# Patient Record
Sex: FEMALE | Race: WHITE | NOT HISPANIC OR LATINO | Employment: UNEMPLOYED | URBAN - METROPOLITAN AREA
[De-identification: names, ages, dates, MRNs, and addresses within clinical notes are randomized per-mention and may not be internally consistent; named-entity substitution may affect disease eponyms.]

---

## 2018-07-25 ENCOUNTER — OFFICE VISIT (OUTPATIENT)
Dept: FAMILY MEDICINE | Facility: CLINIC | Age: 28
End: 2018-07-25
Payer: OTHER GOVERNMENT

## 2018-07-25 VITALS
SYSTOLIC BLOOD PRESSURE: 108 MMHG | WEIGHT: 157 LBS | HEART RATE: 72 BPM | HEIGHT: 65 IN | BODY MASS INDEX: 26.16 KG/M2 | RESPIRATION RATE: 16 BRPM | DIASTOLIC BLOOD PRESSURE: 72 MMHG

## 2018-07-25 DIAGNOSIS — D24.2 FIBROADENOMA OF BOTH BREASTS: Primary | ICD-10-CM

## 2018-07-25 DIAGNOSIS — D24.1 FIBROADENOMA OF BOTH BREASTS: Primary | ICD-10-CM

## 2018-07-25 DIAGNOSIS — B35.4 TINEA CORPORIS: ICD-10-CM

## 2018-07-25 DIAGNOSIS — Z12.83 SKIN CANCER SCREENING: ICD-10-CM

## 2018-07-25 PROCEDURE — 99999 PR PBB SHADOW E&M-NEW PATIENT-LVL III: CPT | Mod: PBBFAC,,, | Performed by: FAMILY MEDICINE

## 2018-07-25 PROCEDURE — 99204 OFFICE O/P NEW MOD 45 MIN: CPT | Mod: S$PBB,,, | Performed by: FAMILY MEDICINE

## 2018-07-25 PROCEDURE — 99203 OFFICE O/P NEW LOW 30 MIN: CPT | Mod: PBBFAC | Performed by: FAMILY MEDICINE

## 2018-07-25 RX ORDER — KETOCONAZOLE 20 MG/G
CREAM TOPICAL 2 TIMES DAILY
Qty: 60 G | Refills: 5 | Status: SHIPPED | OUTPATIENT
Start: 2018-07-25 | End: 2020-05-07

## 2018-07-25 RX ORDER — TRIAMCINOLONE ACETONIDE 1 MG/G
CREAM TOPICAL 2 TIMES DAILY
Qty: 80 G | Refills: 5 | Status: SHIPPED | OUTPATIENT
Start: 2018-07-25 | End: 2020-05-07

## 2018-07-25 NOTE — PROGRESS NOTES
Subjective:       Patient ID: Vannesa Wilkins is a 27 y.o. female.    Chief Complaint: Establish Care (est care referrals  OB GYN )    HPI  27 year odl female comes in to establish care. She says that she has a couple of questions as well.     She is concerned about painful lumps in both breasts. This was evaluated in [place] prior to moving here. She says taht these lumps are painful and larger prior to her cycle. She says that she has noticed more that she has lost weight.     She has very fair complexion and is concerned about skin health - she has strong family history of melanoma - no previous issues herself. She has an itchy patchy rash to her back hudson tis worse with sweating.    PMH, PSH, ALLERGIES, SH, FH reviewed in nurse's notes above  Medications reconciled in the nurse's notes      Review of Systems   Constitutional: Negative for chills and fever.   HENT: Negative for congestion, ear pain, postnasal drip, rhinorrhea, sore throat and trouble swallowing.    Eyes: Negative for redness and itching.   Respiratory: Negative for cough, shortness of breath and wheezing.    Cardiovascular: Negative for chest pain and palpitations.   Gastrointestinal: Negative for abdominal pain, diarrhea, nausea and vomiting.   Genitourinary: Negative for dysuria and frequency.   Skin: Negative for rash.   Neurological: Negative for weakness and headaches.       Objective:      Physical Exam   Constitutional: She is oriented to person, place, and time. She appears well-developed. No distress.   HENT:   Head: Normocephalic and atraumatic.   Eyes: Conjunctivae are normal. Pupils are equal, round, and reactive to light.   Neck: Normal range of motion. Neck supple. No thyromegaly present.   Cardiovascular: Normal rate, regular rhythm, normal heart sounds and intact distal pulses.    Pulmonary/Chest: Effort normal and breath sounds normal. No respiratory distress. She has no wheezes.   Abdominal: Soft. Bowel sounds are normal. There is  no tenderness.   To chest wall prominent breast tissue in bilateral breasts   Musculoskeletal: Normal range of motion. She exhibits no edema.   Lymphadenopathy:     She has no cervical adenopathy.   Neurological: She is alert and oriented to person, place, and time.   Skin: Skin is warm and dry. No rash noted.   Scalloped rash to mid back - erythematous  patches   Psychiatric: She has a normal mood and affect. Her behavior is normal.   anxious   Nursing note and vitals reviewed.       Assessment/Plan:       Problem List Items Addressed This Visit     None      Visit Diagnoses     Fibroadenoma of both breasts    -  Primary    Relevant Orders    Ambulatory referral to Obstetrics / Gynecology    US Breast Bilateral Complete    Tinea corporis        Relevant Medications    ketoconazole (NIZORAL) 2 % cream    triamcinolone acetonide 0.1% (KENALOG) 0.1 % cream    Skin cancer screening        Relevant Orders    Ambulatory referral to Dermatology      will send for u/s. disucssed with rad - no need for mammo, yet.    Discussed contraception and methods    Ref to derm for skin screening and ob for future health main and monitoring of breast.    RTC if condition acutely worsens or any other concerns, otherwise RTC as scheduled

## 2018-07-30 ENCOUNTER — TELEPHONE (OUTPATIENT)
Dept: FAMILY MEDICINE | Facility: CLINIC | Age: 28
End: 2018-07-30

## 2018-08-08 ENCOUNTER — HOSPITAL ENCOUNTER (OUTPATIENT)
Dept: RADIOLOGY | Facility: HOSPITAL | Age: 28
Discharge: HOME | End: 2018-08-08
Attending: FAMILY MEDICINE
Payer: OTHER GOVERNMENT

## 2018-08-08 DIAGNOSIS — D24.1 FIBROADENOMA OF BOTH BREASTS: ICD-10-CM

## 2018-08-08 DIAGNOSIS — D24.2 FIBROADENOMA OF BOTH BREASTS: ICD-10-CM

## 2018-08-08 PROCEDURE — 76641 ULTRASOUND BREAST COMPLETE: CPT | Mod: 26,50,, | Performed by: RADIOLOGY

## 2018-08-08 PROCEDURE — 76641 ULTRASOUND BREAST COMPLETE: CPT | Mod: TC,50

## 2018-08-20 ENCOUNTER — PATIENT MESSAGE (OUTPATIENT)
Dept: FAMILY MEDICINE | Facility: CLINIC | Age: 28
End: 2018-08-20

## 2018-08-20 DIAGNOSIS — S29.012D STRAIN OF RHOMBOID MUSCLE, SUBSEQUENT ENCOUNTER: Primary | ICD-10-CM

## 2018-10-10 ENCOUNTER — PATIENT MESSAGE (OUTPATIENT)
Dept: FAMILY MEDICINE | Facility: CLINIC | Age: 28
End: 2018-10-10

## 2018-10-11 ENCOUNTER — OFFICE VISIT (OUTPATIENT)
Dept: FAMILY MEDICINE | Facility: CLINIC | Age: 28
End: 2018-10-11
Payer: OTHER GOVERNMENT

## 2018-10-11 ENCOUNTER — TELEPHONE (OUTPATIENT)
Dept: FAMILY MEDICINE | Facility: CLINIC | Age: 28
End: 2018-10-11

## 2018-10-11 VITALS
WEIGHT: 155.81 LBS | DIASTOLIC BLOOD PRESSURE: 82 MMHG | HEART RATE: 64 BPM | BODY MASS INDEX: 25.96 KG/M2 | RESPIRATION RATE: 18 BRPM | SYSTOLIC BLOOD PRESSURE: 120 MMHG | HEIGHT: 65 IN | OXYGEN SATURATION: 100 %

## 2018-10-11 DIAGNOSIS — K60.2 FISSURE, ANAL: ICD-10-CM

## 2018-10-11 DIAGNOSIS — S31.831A ANAL TEAR: Primary | ICD-10-CM

## 2018-10-11 PROCEDURE — 99213 OFFICE O/P EST LOW 20 MIN: CPT | Mod: S$PBB,,, | Performed by: FAMILY MEDICINE

## 2018-10-11 PROCEDURE — 99999 PR PBB SHADOW E&M-EST. PATIENT-LVL III: CPT | Mod: PBBFAC,,, | Performed by: FAMILY MEDICINE

## 2018-10-11 PROCEDURE — 99213 OFFICE O/P EST LOW 20 MIN: CPT | Mod: PBBFAC | Performed by: FAMILY MEDICINE

## 2018-10-11 RX ORDER — HYDROCORTISONE ACETATE 25 MG/1
25 SUPPOSITORY RECTAL 2 TIMES DAILY
Qty: 20 SUPPOSITORY | Refills: 0 | Status: SHIPPED | OUTPATIENT
Start: 2018-10-11 | End: 2018-10-21

## 2018-10-11 NOTE — PATIENT INSTRUCTIONS
Anal Fissure (Child)    The anal canal is the end portion of the intestinal tract. It includes the rectum and anus. Stool is passed through the anus. Sometimes a crack or tear develops in the lining of the anal canal. This condition is called an anal fissure.  Anal fissures are caused by trauma or stretching of the anal canal. This is most often due to constipation (having hard, zdgpsnnoi-jo-pwey stools). Severe diarrhea or insertion of an object into the anal canal may also cause a fissure.  Symptoms include pain and bleeding, especially during a bowel movement. Sometimes there is swelling, itching, and skin irritation. The area may spasm, causing more pain and skin separation. The most common complication is infection, which may lead to an abscess (pocket of pus). When this happens, there may also be discharge from the fissure.  An anal fissure usually heals on its own with no special treatment. Home care instructions to help prevent constipation and relieve symptoms may be given. Once the area has healed, follow-up tests may be needed.  In some cases, a fissure does not heal on its own. Surgery may then be needed to close the tear.  Home care  Medicines  Your child may be prescribed medicines, such as pain relievers, stool softeners, or laxatives. Make sure to follow all instructions when giving any of these medicines to your child. Note: Do not give your child over-the-counter medicines without talking to the provider first.  General care  · To help ease constipation, you may be told to:  ¨ Increase the fiber in your childs diet. This includes foods such as whole grains, fresh fruits, and vegetables. Fiber adds bulk to stool and absorbs water to soften stool. This helps stool pass through the colon more easily. If needed, a fiber supplement may also be prescribed.  ¨ Encourage your child to drink lots of water. This can also help soften stool.  · To help relieve pain and relax the muscles in the anus, have  43 year old,  , presents at 35 2/7 weeks in pregnancy complicated by AMA for biophysical assessment.    Fetal Breathing Movements (FBM): Normal - 2  Gross Body Movements (GBM): Normal - 2  Fetal Tone (FT): Normal - 2  AFV: Pocket of amniotic fluid > or = to 2 cm x 2 cm - 2  Quantitative Amniotic Fluid Volume Total: 18.4 cm      BPP 8/8.  FHR = 134bpm Normal.   MCA Not done.  NST Not done.     Single fetus in transverse presentation. Umbilical cord and arm presenting. Placenta posterior and grade 1.      C/s in one week.    ERROL Domínguez       your child soak in a bath with a few inches of warm water. This is a called a sitz bath. Your child should do this for 10 to 15 minutes at time, a few times a day, or as advised.  · Keep a careful record of when your child has a bowel movement and the type of stool that was passed. This may help the provider determine future care for your child. Older children should be encouraged to keep their own record.  · Check your childs anus for bleeding or signs of infection (see below). Older children may be asked to help monitor their own symptoms.  Follow-up care  Follow up with your childs healthcare provider as advised. If testing was done, youll be told the results and any new findings that may affect your childs care.  When to seek medical advice  Unless your childs healthcare provider advises otherwise, call the provider right away if:  · Your child is 3 months old or younger and has a fever of 100.4°F (38°C) or higher. (Seek treatment right away. Fever in a young baby can be a sign of a serious infection.)  · Your child is younger than 2 years of age and has a fever of 100.4°F (38°C) that lasts for more than 1 day.  · Your child is 2 years old or older and has a fever of 100.4°F (38°C) that lasts for more than 3 days.  · Your child has repeated fevers above 104°F (40°C).  Also call the provider right away if:  · Your child symptoms worsen, or they dont improve with home care measures.  · Your child has signs of infection such as increased redness, swelling, or foul-smelling drainage in the area around the fissure.  · Your child has ongoing constipation or explosive diarrhea.  Call 911  Call 911 right away if:  · Your child has trouble breathing.  · Your child has trouble swallowing.  · Your child faints.  · Your child has an unusually fast heart rate.  · Your child has large amounts of bleeding from the anus or blood in the stool.  Date Last Reviewed: 6/15/2015  © 2159-1566 [employer] [address]  [place] [address]. All rights reserved. This information is not intended as a substitute for professional medical care. Always follow your healthcare professional's instructions.

## 2018-10-11 NOTE — TELEPHONE ENCOUNTER
----- Message from Guanako Sam sent at 10/11/2018 12:24 PM CDT -----  Contact: Patient  Vannesa Wlikins  MRN: [id number]  : 1990  PCP: Jolie Roberson  Home Phone      [phone]  Work Phone      Not on file.  Mobile          [phone]      MESSAGE:  Blood in stool -- requesting sooner appt than 10/17/18     Call [phone]    PCP: Karol

## 2018-10-11 NOTE — PROGRESS NOTES
Subjective:       Patient ID: Vannesa Wilkins is a 27 y.o. female.    Chief Complaint: Rectal Bleeding    HPI  27 year old female comes in with c/o rectal bleeding. She says that she had diarrhea last Saturday night. On Sunday morning she passed gas and had 2 small flecks of blood and abdominal cramping. She then had a normal BM with blood on the tissue afterwards. She subsequently had lower abdominal pain a week later and started her cycle the next day.    PMH, PSH, ALLERGIES, SH, FH reviewed in nurse's notes above  Medications reconciled in the nurse's notes    Review of Systems   Constitutional: Negative for chills and fever.   HENT: Negative for congestion, ear pain, postnasal drip, rhinorrhea, sore throat and trouble swallowing.    Eyes: Negative for redness and itching.   Respiratory: Negative for cough, shortness of breath and wheezing.    Cardiovascular: Negative for chest pain and palpitations.   Gastrointestinal: Positive for anal bleeding. Negative for abdominal pain, diarrhea, nausea and vomiting.   Genitourinary: Negative for dysuria and frequency.   Skin: Negative for rash.   Neurological: Negative for weakness and headaches.   Psychiatric/Behavioral: The patient is nervous/anxious.        Objective:      Physical Exam   Constitutional: She is oriented to person, place, and time. She appears well-developed. No distress.   HENT:   Head: Normocephalic and atraumatic.   Eyes: Conjunctivae are normal. Pupils are equal, round, and reactive to light.   Neck: Normal range of motion. Neck supple. No thyromegaly present.   Cardiovascular: Normal rate, regular rhythm, normal heart sounds and intact distal pulses.   Pulmonary/Chest: Effort normal and breath sounds normal. No respiratory distress. She has no wheezes.   Abdominal: Soft. Bowel sounds are normal. There is no tenderness.   Genitourinary:   Genitourinary Comments: Fissure/tear to 12:00   Musculoskeletal: Normal range of motion. She exhibits no edema.    Lymphadenopathy:     She has no cervical adenopathy.   Neurological: She is alert and oriented to person, place, and time.   Skin: Skin is warm and dry. No rash noted.   Psychiatric: She has a normal mood and affect. Her behavior is normal.   Nursing note and vitals reviewed.       Assessment/Plan:       Problem List Items Addressed This Visit     None      Visit Diagnoses     Anal tear    -  Primary    Relevant Medications    hydrocortisone (ANUSOL-HC) 25 mg suppository    Fissure, anal            Will send to counseling for anxiety.    RTC if condition acutely worsens or any other concerns, otherwise RTC as scheduled

## 2019-03-13 ENCOUNTER — PATIENT MESSAGE (OUTPATIENT)
Dept: FAMILY MEDICINE | Facility: CLINIC | Age: 29
End: 2019-03-13

## 2019-04-09 ENCOUNTER — OFFICE VISIT (OUTPATIENT)
Dept: FAMILY MEDICINE | Facility: CLINIC | Age: 29
End: 2019-04-09
Payer: OTHER GOVERNMENT

## 2019-04-09 VITALS
HEIGHT: 60 IN | HEART RATE: 76 BPM | RESPIRATION RATE: 18 BRPM | SYSTOLIC BLOOD PRESSURE: 110 MMHG | BODY MASS INDEX: 30.7 KG/M2 | WEIGHT: 156.38 LBS | DIASTOLIC BLOOD PRESSURE: 70 MMHG

## 2019-04-09 DIAGNOSIS — Z12.4 CERVICAL CANCER SCREENING: Primary | ICD-10-CM

## 2019-04-09 DIAGNOSIS — S09.90XA HEAD TRAUMA, INITIAL ENCOUNTER: ICD-10-CM

## 2019-04-09 PROCEDURE — 99214 OFFICE O/P EST MOD 30 MIN: CPT | Mod: 25,S$PBB,, | Performed by: FAMILY MEDICINE

## 2019-04-09 PROCEDURE — 99999 PR PBB SHADOW E&M-EST. PATIENT-LVL III: CPT | Mod: PBBFAC,,, | Performed by: FAMILY MEDICINE

## 2019-04-09 PROCEDURE — 88175 CYTOPATH C/V AUTO FLUID REDO: CPT

## 2019-04-09 PROCEDURE — 99214 PR OFFICE/OUTPT VISIT, EST, LEVL IV, 30-39 MIN: ICD-10-PCS | Mod: 25,S$PBB,, | Performed by: FAMILY MEDICINE

## 2019-04-09 PROCEDURE — 99213 OFFICE O/P EST LOW 20 MIN: CPT | Mod: PBBFAC,25 | Performed by: FAMILY MEDICINE

## 2019-04-09 PROCEDURE — 99999 PR PBB SHADOW E&M-EST. PATIENT-LVL III: ICD-10-PCS | Mod: PBBFAC,,, | Performed by: FAMILY MEDICINE

## 2019-04-09 RX ORDER — GABAPENTIN 100 MG/1
100 CAPSULE ORAL NIGHTLY
Qty: 30 CAPSULE | Refills: 2 | Status: SHIPPED | OUTPATIENT
Start: 2019-04-09 | End: 2020-05-07

## 2019-04-09 NOTE — PROGRESS NOTES
Subjective:       Patient ID: Vannesa Wilkins is a 28 y.o. female.    Chief Complaint: Well Woman    HPI  28 year old female comes in for well woman exam. She is due for her pap smear today. She has noticed heavier cycles recently.    She also notes that in October she hit her head on a pull up bar. Since then she has had headaches on and off. SHe also notes that behind her left ear she has a swelling that has been there for at least 3 years but is more prominent now.    PMH, PSH, ALLERGIES, SH, FH reviewed in nurse's notes above  Medications reconciled in the nurse's notes    Review of Systems   Constitutional: Negative for chills and fever.   HENT: Negative for congestion, ear pain, postnasal drip, rhinorrhea, sore throat and trouble swallowing.    Eyes: Negative for redness and itching.   Respiratory: Negative for cough, shortness of breath and wheezing.    Cardiovascular: Negative for chest pain and palpitations.   Gastrointestinal: Negative for abdominal pain, diarrhea, nausea and vomiting.   Genitourinary: Positive for menstrual problem. Negative for dysuria and frequency.   Skin: Negative for rash.   Neurological: Positive for headaches. Negative for weakness.       Objective:      Physical Exam   Constitutional: She is oriented to person, place, and time. She appears well-developed. No distress.   HENT:   Head: Normocephalic and atraumatic.   Eyes: Pupils are equal, round, and reactive to light. Conjunctivae are normal.   Neck: Normal range of motion. Neck supple. No thyromegaly present.   Cardiovascular: Normal rate, regular rhythm, normal heart sounds and intact distal pulses.   Pulmonary/Chest: Effort normal and breath sounds normal. No respiratory distress. She has no wheezes.   Abdominal: Soft. Bowel sounds are normal. There is no tenderness.   Musculoskeletal: Normal range of motion. She exhibits no edema.   Lymphadenopathy:     She has no cervical adenopathy.   Neurological: She is alert and oriented to  person, place, and time.   Skin: Skin is warm and dry. No rash noted.   Psychiatric: She has a normal mood and affect. Her behavior is normal.   Nursing note and vitals reviewed.       Assessment/Plan:       Problem List Items Addressed This Visit     None      Visit Diagnoses     Cervical cancer screening    -  Primary    Relevant Orders    Liquid-based pap smear, screening    Head trauma, initial encounter        Relevant Medications    gabapentin (NEURONTIN) 100 MG capsule    Other Relevant Orders    CT Head Without Contrast        RTC if condition acutely worsens or any other concerns, otherwise RTC as scheduled

## 2019-04-10 ENCOUNTER — HOSPITAL ENCOUNTER (OUTPATIENT)
Dept: RADIOLOGY | Facility: HOSPITAL | Age: 29
Discharge: HOME | End: 2019-04-10
Attending: FAMILY MEDICINE
Payer: OTHER GOVERNMENT

## 2019-04-10 DIAGNOSIS — S09.90XA HEAD TRAUMA, INITIAL ENCOUNTER: ICD-10-CM

## 2019-04-10 PROCEDURE — 70450 CT HEAD/BRAIN W/O DYE: CPT | Mod: TC

## 2019-04-12 ENCOUNTER — PATIENT MESSAGE (OUTPATIENT)
Dept: FAMILY MEDICINE | Facility: CLINIC | Age: 29
End: 2019-04-12

## 2019-04-22 ENCOUNTER — PATIENT MESSAGE (OUTPATIENT)
Dept: FAMILY MEDICINE | Facility: CLINIC | Age: 29
End: 2019-04-22

## 2019-07-01 ENCOUNTER — PATIENT MESSAGE (OUTPATIENT)
Dept: FAMILY MEDICINE | Facility: CLINIC | Age: 29
End: 2019-07-01

## 2019-07-01 NOTE — TELEPHONE ENCOUNTER
Notes:  Authorization #[id number]; 4 visits; visit date range 7/31/2018 to 7/31/2019  Referral to Dr Dayo Hobbs MD   [address] [place] [id number]   Phone: [phone]   Fax: [phone]

## 2019-07-02 ENCOUNTER — PATIENT MESSAGE (OUTPATIENT)
Dept: FAMILY MEDICINE | Facility: CLINIC | Age: 29
End: 2019-07-02

## 2019-07-02 DIAGNOSIS — Z12.83 SCREENING FOR MALIGNANT NEOPLASM OF SKIN: Primary | ICD-10-CM

## 2019-07-02 NOTE — TELEPHONE ENCOUNTER
Authorization #  [id number]  Patient  Vannesa Wilkins  Service  Dermatology - DERMATOLOGY, GENERAL  Rendering provider  [facility]  [address] [place] [address]  BELINDA Shankar [id number]  Date submitted  07/02/2019

## 2020-02-19 ENCOUNTER — PATIENT MESSAGE (OUTPATIENT)
Dept: FAMILY MEDICINE | Facility: CLINIC | Age: 30
End: 2020-02-19

## 2020-02-20 ENCOUNTER — TELEPHONE (OUTPATIENT)
Dept: FAMILY MEDICINE | Facility: CLINIC | Age: 30
End: 2020-02-20

## 2020-02-20 ENCOUNTER — OFFICE VISIT (OUTPATIENT)
Dept: INTERNAL MEDICINE | Facility: CLINIC | Age: 30
End: 2020-02-20
Payer: OTHER GOVERNMENT

## 2020-02-20 VITALS
OXYGEN SATURATION: 100 % | SYSTOLIC BLOOD PRESSURE: 124 MMHG | BODY MASS INDEX: 26.66 KG/M2 | HEIGHT: 65 IN | HEART RATE: 79 BPM | RESPIRATION RATE: 18 BRPM | WEIGHT: 160 LBS | DIASTOLIC BLOOD PRESSURE: 82 MMHG

## 2020-02-20 DIAGNOSIS — M54.16 LUMBAR BACK PAIN WITH RADICULOPATHY AFFECTING LOWER EXTREMITY: Primary | ICD-10-CM

## 2020-02-20 PROCEDURE — 99213 OFFICE O/P EST LOW 20 MIN: CPT | Mod: S$PBB,,, | Performed by: NURSE PRACTITIONER

## 2020-02-20 PROCEDURE — 99213 PR OFFICE/OUTPT VISIT, EST, LEVL III, 20-29 MIN: ICD-10-PCS | Mod: S$PBB,,, | Performed by: NURSE PRACTITIONER

## 2020-02-20 PROCEDURE — 99213 OFFICE O/P EST LOW 20 MIN: CPT | Mod: PBBFAC,PN | Performed by: NURSE PRACTITIONER

## 2020-02-20 PROCEDURE — 99999 PR PBB SHADOW E&M-EST. PATIENT-LVL III: CPT | Mod: PBBFAC,,, | Performed by: NURSE PRACTITIONER

## 2020-02-20 PROCEDURE — 99999 PR PBB SHADOW E&M-EST. PATIENT-LVL III: ICD-10-PCS | Mod: PBBFAC,,, | Performed by: NURSE PRACTITIONER

## 2020-02-20 NOTE — PATIENT INSTRUCTIONS
How Your Back Works  A healthy back allows you to bend and stretch without pain. The spine has three natural curves, which keep your body balanced. Strong, flexible muscles support your spine. Soft, cushioning disks separate the hard bones of your spine, allowing it to bend and move.    The parts of the spine  · The vertebrae are the 24 bones that make up the spine.  · The spinous process is the part of each vertebra you can feel through your skin.  · Each of these bones has a canal that runs top to bottom. Together these canals form a tunnel called the spinal canal.  · The lamina of each vertebra forms the back of the spinal canal.  · Running through the canal are nerves.  · A foramen is a small opening where a nerve leaves the spinal canal.  · Disks serve as cushions between vertebrae. A disks soft center absorbs shock during movement.     Two vertebrae and a disk     The supporting muscles  Strong, flexible muscles help maintain your three natural curves. They hold your spine in proper alignment. This helps support your upper body. Strong core muscular including the stomach, buttock, and thigh muscles help take the strain off your back.  Date Last Reviewed: 8/31/2015  © 6687-0538 [employer]. [address] [place] [address], [place] [address]. All rights reserved. This information is not intended as a substitute for professional medical care. Always follow your healthcare professional's instructions.

## 2020-02-20 NOTE — PROGRESS NOTES
Subjective:       Patient ID: Vannesa Wilkins is a 29 y.o. female.    Chief Complaint: Mass (lump on back)    Patient is unknown, to me and presents with   Chief Complaint   Patient presents with    Mass     lump on back   .  Denies chest pain and shortness of breath.  Patient presents new to me but known to [facility] for ongoing back pain. 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 States that she has been going to chiro and taking anti inflamm to no avail. Also her chiro noted she had a pea size mass to right side of lower lumbar region with some tenderness. Concerned about what could be going on with her back. Since she has had tx for her back and it is not improving she and chiro is requesting MRI  Describes pain as sharp in nature and sometimes does travel to LE. No weakness or tingling to LE      HPI  Review of Systems   Constitutional: Negative for activity change, appetite change, fatigue, fever and unexpected weight change.   HENT: Negative for congestion, ear discharge, ear pain, hearing loss, postnasal drip and tinnitus.    Eyes: Negative for photophobia, pain and visual disturbance.   Respiratory: Negative for cough, shortness of breath, wheezing and stridor.    Cardiovascular: Negative for chest pain, palpitations and leg swelling.   Gastrointestinal: Negative for abdominal distention.   Genitourinary: Negative for difficulty urinating, dysuria, frequency, hematuria and urgency.   Musculoskeletal: Positive for back pain. Negative for arthralgias, gait problem, joint swelling, myalgias, neck pain and neck stiffness.   Skin: Negative.    Neurological: Negative for dizziness, seizures, syncope, weakness, light-headedness, numbness and headaches.   Hematological: Negative for adenopathy. Does not bruise/bleed easily.   Psychiatric/Behavioral: Negative for behavioral problems, confusion, dysphoric mood, hallucinations, sleep disturbance and suicidal ideas. The patient is not nervous/anxious.        Objective:       Physical Exam   Constitutional: She is oriented to person, place, and time. She appears well-developed and well-nourished. No distress.   HENT:   Head: Normocephalic and atraumatic.   Right Ear: External ear normal.   Left Ear: External ear normal.   Mouth/Throat: Oropharynx is clear and moist. No oropharyngeal exudate.   Eyes: Pupils are equal, round, and reactive to light. Conjunctivae and EOM are normal. Right eye exhibits no discharge. Left eye exhibits no discharge.   Neck: Normal range of motion. Neck supple. No JVD present. No thyromegaly present.   Cardiovascular: Normal rate, regular rhythm, normal heart sounds and intact distal pulses. Exam reveals no gallop and no friction rub.   No murmur heard.  Pulmonary/Chest: Effort normal and breath sounds normal. No stridor. No respiratory distress. She has no wheezes. She has no rales. She exhibits no tenderness.   Abdominal: Soft. Bowel sounds are normal. She exhibits no distension and no mass. There is no tenderness. There is no rebound.   Musculoskeletal: Normal range of motion. She exhibits tenderness. She exhibits no edema.        Lumbar back: She exhibits tenderness and pain.        Back:    Lymphadenopathy:     She has no cervical adenopathy.   Neurological: She is alert and oriented to person, place, and time. She has normal reflexes. She displays normal reflexes. No cranial nerve deficit or sensory deficit. She exhibits normal muscle tone. Coordination normal.   Skin: Skin is warm and dry. Capillary refill takes less than 2 seconds. No rash noted. No erythema. No pallor.   Psychiatric: She has a normal mood and affect. Her behavior is normal. Judgment and thought content normal.       Assessment:       1. Lumbar back pain with radiculopathy affecting lower extremity        Plan:   Vannesa was seen today for mass.    Diagnoses and all orders for this visit:    Lumbar back pain with radiculopathy affecting lower extremity  -     MRI Lumbar Spine Without  "Contrast; Future    "This note will not be shared with the patient."  Will order MRI since she has tried tx and other otc remedies with no abatement of s.s  Will call with results  rtc as scheduled  "

## 2020-02-20 NOTE — TELEPHONE ENCOUNTER
----- Message from Guanako Sam sent at 2020  1:09 PM CST -----  Contact: patient  Vannesa Wilkins  MRN: [id number]  : 1990  PCP: Jolie Roberson  Home Phone      [phone]  Work Phone      Not on file.  Mobile          [phone]      MESSAGE: has been discussing back issues thru the portal - states she has a bump on her back -- requesting appt today (any Dr) if possible    Call [phone]    PCP: Karol

## 2020-02-20 NOTE — TELEPHONE ENCOUNTER
Called patient and offered her the next available appointment on 2/26/2020. States that she would rather just keep her current appointment on 03/06/2020

## 2020-02-21 ENCOUNTER — HOSPITAL ENCOUNTER (OUTPATIENT)
Dept: RADIOLOGY | Facility: HOSPITAL | Age: 30
Discharge: HOME | End: 2020-02-21
Attending: NURSE PRACTITIONER
Payer: OTHER GOVERNMENT

## 2020-02-21 DIAGNOSIS — M54.16 LUMBAR BACK PAIN WITH RADICULOPATHY AFFECTING LOWER EXTREMITY: ICD-10-CM

## 2020-02-21 PROCEDURE — 72148 MRI LUMBAR SPINE W/O DYE: CPT | Mod: TC

## 2020-02-27 ENCOUNTER — TELEPHONE (OUTPATIENT)
Dept: FAMILY MEDICINE | Facility: CLINIC | Age: 30
End: 2020-02-27

## 2020-02-27 NOTE — TELEPHONE ENCOUNTER
Patient has an appointment on 3/19 with you, but she would like to get your opinion from her MRI asap.

## 2020-02-27 NOTE — TELEPHONE ENCOUNTER
----- Message from Shiela Harrell sent at 2020  1:44 PM CST -----  Contact: Self  Vannesa Wilkins  MRN: [id number]  : 1990  PCP: Jolie Roberson  Home Phone      [phone]  Work Phone      Not on file.  Mobile          [phone]      MESSAGE:   Patient would like to get test results.    Name of test (lab, mammo, etc.):  MRI  Date of test:    Ordering provider: [place]  Where was the test performed:  [facility]  Comments:  Patient would like Dr. Roberson to go over results with her.    Phone: [phone]

## 2020-03-10 ENCOUNTER — TELEPHONE (OUTPATIENT)
Dept: INTERNAL MEDICINE | Facility: CLINIC | Age: 30
End: 2020-03-10

## 2020-03-10 DIAGNOSIS — M54.9 BACK PAIN, UNSPECIFIED BACK LOCATION, UNSPECIFIED BACK PAIN LATERALITY, UNSPECIFIED CHRONICITY: Primary | ICD-10-CM

## 2020-03-12 ENCOUNTER — PATIENT MESSAGE (OUTPATIENT)
Dept: FAMILY MEDICINE | Facility: CLINIC | Age: 30
End: 2020-03-12

## 2020-05-07 ENCOUNTER — PATIENT MESSAGE (OUTPATIENT)
Dept: FAMILY MEDICINE | Facility: CLINIC | Age: 30
End: 2020-05-07

## 2020-05-07 ENCOUNTER — OFFICE VISIT (OUTPATIENT)
Dept: FAMILY MEDICINE | Facility: CLINIC | Age: 30
End: 2020-05-07
Payer: OTHER GOVERNMENT

## 2020-05-07 VITALS
WEIGHT: 160.25 LBS | SYSTOLIC BLOOD PRESSURE: 124 MMHG | DIASTOLIC BLOOD PRESSURE: 82 MMHG | BODY MASS INDEX: 26.7 KG/M2 | RESPIRATION RATE: 14 BRPM | HEIGHT: 65 IN | HEART RATE: 72 BPM

## 2020-05-07 DIAGNOSIS — Z01.419 WELL WOMAN EXAM: Primary | ICD-10-CM

## 2020-05-07 DIAGNOSIS — Z13.220 LIPID SCREENING: ICD-10-CM

## 2020-05-07 PROCEDURE — 99999 PR PBB SHADOW E&M-EST. PATIENT-LVL III: CPT | Mod: PBBFAC,,, | Performed by: FAMILY MEDICINE

## 2020-05-07 PROCEDURE — 99395 PREV VISIT EST AGE 18-39: CPT | Mod: S$PBB,,, | Performed by: FAMILY MEDICINE

## 2020-05-07 PROCEDURE — 99999 PR PBB SHADOW E&M-EST. PATIENT-LVL III: ICD-10-PCS | Mod: PBBFAC,,, | Performed by: FAMILY MEDICINE

## 2020-05-07 PROCEDURE — 99395 PR PREVENTIVE VISIT,EST,18-39: ICD-10-PCS | Mod: S$PBB,,, | Performed by: FAMILY MEDICINE

## 2020-05-07 PROCEDURE — 99213 OFFICE O/P EST LOW 20 MIN: CPT | Mod: PBBFAC | Performed by: FAMILY MEDICINE

## 2020-05-07 RX ORDER — METHOCARBAMOL 500 MG/1
500 TABLET, FILM COATED ORAL 4 TIMES DAILY
Qty: 40 TABLET | Refills: 5 | Status: SHIPPED | OUTPATIENT
Start: 2020-05-07 | End: 2020-05-17

## 2020-05-07 RX ORDER — METHOCARBAMOL 500 MG/1
500 TABLET, FILM COATED ORAL 4 TIMES DAILY
Qty: 40 TABLET | Refills: 5 | Status: CANCELLED | OUTPATIENT
Start: 2020-05-07 | End: 2020-05-17

## 2020-05-07 NOTE — PROGRESS NOTES
Subjective:       Patient ID: Vannesa Wilkins is a 29 y.o. female.    Chief Complaint: Gynecologic Exam and Temporomandibular Joint Pain    HPI  29 year old female comes in for annual exam. She had a normal pap last year. She has always had a normal pap. She does not know if she received the hpv vacc. She is concerned because her dad has head/neck cancer that is hpv related.  She is c/o right tmj pain as well.  Finally, her back has continued to bother her here and there. She does well with rest and has found some middle ground in exercise. MRI reviewed.    PMH, PSH, ALLERGIES, SH, FH reviewed in nurse's notes above  Medications reconciled in the nurse's notes    Review of Systems   Constitutional: Negative for activity change and unexpected weight change.   HENT: Positive for rhinorrhea. Negative for hearing loss and trouble swallowing.    Eyes: Negative for discharge and visual disturbance.   Respiratory: Negative for chest tightness and wheezing.    Cardiovascular: Negative for chest pain and palpitations.   Gastrointestinal: Negative for blood in stool, constipation, diarrhea and vomiting.   Endocrine: Negative for polydipsia and polyuria.   Genitourinary: Negative for difficulty urinating, dysuria, hematuria and menstrual problem.   Musculoskeletal: Positive for arthralgias and neck pain. Negative for joint swelling.   Neurological: Negative for weakness and headaches.   Psychiatric/Behavioral: Negative for confusion and dysphoric mood.       Objective:      Physical Exam   Constitutional: She is oriented to person, place, and time. She appears well-developed. No distress.   HENT:   Head: Normocephalic and atraumatic.   Eyes: Pupils are equal, round, and reactive to light. Conjunctivae are normal.   Neck: Normal range of motion. Neck supple. No thyromegaly present.   Cardiovascular: Normal rate, regular rhythm, normal heart sounds and intact distal pulses.   Pulmonary/Chest: Effort normal and breath sounds normal.  No respiratory distress. She has no wheezes.   Abdominal: Soft. Bowel sounds are normal. There is no tenderness.   Musculoskeletal: Normal range of motion. She exhibits no edema.   Muscular prominence to right si area   Lymphadenopathy:     She has no cervical adenopathy.   Neurological: She is alert and oriented to person, place, and time.   Skin: Skin is warm and dry. No rash noted.   Psychiatric: She has a normal mood and affect. Her behavior is normal.   Nursing note and vitals reviewed.       Assessment/Plan:       Problem List Items Addressed This Visit     None      Visit Diagnoses     Well woman exam    -  Primary    Relevant Orders    Comprehensive metabolic panel (Completed)    CBC auto differential (Completed)    Lipid screening        Relevant Orders    Lipid Panel (Completed)      RTC if condition acutely worsens or any other concerns, otherwise RTC as scheduled

## 2020-05-12 ENCOUNTER — CLINICAL SUPPORT (OUTPATIENT)
Dept: FAMILY MEDICINE | Facility: CLINIC | Age: 30
End: 2020-05-12
Payer: OTHER GOVERNMENT

## 2020-05-12 DIAGNOSIS — Z01.419 WELL WOMAN EXAM: ICD-10-CM

## 2020-05-12 DIAGNOSIS — Z13.220 LIPID SCREENING: ICD-10-CM

## 2020-05-12 LAB
ALBUMIN SERPL BCP-MCNC: 4 G/DL (ref 3.5–5.2)
ALP SERPL-CCNC: 49 U/L (ref 55–135)
ALT SERPL W/O P-5'-P-CCNC: 25 U/L (ref 10–44)
ANION GAP SERPL CALC-SCNC: 9 MMOL/L (ref 8–16)
AST SERPL-CCNC: 15 U/L (ref 10–40)
BASOPHILS # BLD AUTO: 0.04 K/UL (ref 0–0.2)
BASOPHILS NFR BLD: 0.9 % (ref 0–1.9)
BILIRUB SERPL-MCNC: 0.3 MG/DL (ref 0.1–1)
BUN SERPL-MCNC: 16 MG/DL (ref 6–20)
CALCIUM SERPL-MCNC: 9.5 MG/DL (ref 8.7–10.5)
CHLORIDE SERPL-SCNC: 107 MMOL/L (ref 95–110)
CHOLEST SERPL-MCNC: 200 MG/DL (ref 120–199)
CHOLEST/HDLC SERPL: 3.1 {RATIO} (ref 2–5)
CO2 SERPL-SCNC: 24 MMOL/L (ref 23–29)
CREAT SERPL-MCNC: 0.9 MG/DL (ref 0.5–1.4)
DIFFERENTIAL METHOD: NORMAL
EOSINOPHIL # BLD AUTO: 0.2 K/UL (ref 0–0.5)
EOSINOPHIL NFR BLD: 4.5 % (ref 0–8)
ERYTHROCYTE [DISTWIDTH] IN BLOOD BY AUTOMATED COUNT: 12.4 % (ref 11.5–14.5)
EST. GFR  (AFRICAN AMERICAN): >60 ML/MIN/1.73 M^2
EST. GFR  (NON AFRICAN AMERICAN): >60 ML/MIN/1.73 M^2
GLUCOSE SERPL-MCNC: 93 MG/DL (ref 70–110)
HCT VFR BLD AUTO: 41.8 % (ref 37–48.5)
HDLC SERPL-MCNC: 64 MG/DL (ref 40–75)
HDLC SERPL: 32 % (ref 20–50)
HGB BLD-MCNC: 13.4 G/DL (ref 12–16)
IMM GRANULOCYTES # BLD AUTO: 0 K/UL (ref 0–0.04)
IMM GRANULOCYTES NFR BLD AUTO: 0 % (ref 0–0.5)
LDLC SERPL CALC-MCNC: 118.2 MG/DL (ref 63–159)
LYMPHOCYTES # BLD AUTO: 2.1 K/UL (ref 1–4.8)
LYMPHOCYTES NFR BLD: 44.9 % (ref 18–48)
MCH RBC QN AUTO: 30.9 PG (ref 27–31)
MCHC RBC AUTO-ENTMCNC: 32.1 G/DL (ref 32–36)
MCV RBC AUTO: 96 FL (ref 82–98)
MONOCYTES # BLD AUTO: 0.4 K/UL (ref 0.3–1)
MONOCYTES NFR BLD: 8.4 % (ref 4–15)
NEUTROPHILS # BLD AUTO: 1.9 K/UL (ref 1.8–7.7)
NEUTROPHILS NFR BLD: 41.3 % (ref 38–73)
NONHDLC SERPL-MCNC: 136 MG/DL
NRBC BLD-RTO: 0 /100 WBC
PLATELET # BLD AUTO: 187 K/UL (ref 150–350)
PMV BLD AUTO: 12.4 FL (ref 9.2–12.9)
POTASSIUM SERPL-SCNC: 4.5 MMOL/L (ref 3.5–5.1)
PROT SERPL-MCNC: 7.2 G/DL (ref 6–8.4)
RBC # BLD AUTO: 4.34 M/UL (ref 4–5.4)
SODIUM SERPL-SCNC: 140 MMOL/L (ref 136–145)
TRIGL SERPL-MCNC: 89 MG/DL (ref 30–150)
WBC # BLD AUTO: 4.65 K/UL (ref 3.9–12.7)

## 2020-05-12 PROCEDURE — 36415 COLL VENOUS BLD VENIPUNCTURE: CPT | Mod: PBBFAC

## 2020-05-12 PROCEDURE — 80061 LIPID PANEL: CPT

## 2020-05-12 PROCEDURE — 85025 COMPLETE CBC W/AUTO DIFF WBC: CPT

## 2020-05-12 PROCEDURE — 80053 COMPREHEN METABOLIC PANEL: CPT

## 2020-05-13 ENCOUNTER — PATIENT MESSAGE (OUTPATIENT)
Dept: RADIOLOGY | Facility: CLINIC | Age: 30
End: 2020-05-13

## 2020-10-18 ENCOUNTER — PATIENT MESSAGE (OUTPATIENT)
Dept: FAMILY MEDICINE | Facility: CLINIC | Age: 30
End: 2020-10-18

## 2020-10-19 ENCOUNTER — PATIENT MESSAGE (OUTPATIENT)
Dept: FAMILY MEDICINE | Facility: CLINIC | Age: 30
End: 2020-10-19

## 2020-10-20 ENCOUNTER — OFFICE VISIT (OUTPATIENT)
Dept: FAMILY MEDICINE | Facility: CLINIC | Age: 30
End: 2020-10-20
Payer: OTHER GOVERNMENT

## 2020-10-20 VITALS
HEIGHT: 65 IN | WEIGHT: 163.13 LBS | RESPIRATION RATE: 14 BRPM | SYSTOLIC BLOOD PRESSURE: 120 MMHG | DIASTOLIC BLOOD PRESSURE: 84 MMHG | HEART RATE: 88 BPM | BODY MASS INDEX: 27.18 KG/M2 | TEMPERATURE: 100 F

## 2020-10-20 DIAGNOSIS — R10.2 PELVIC PAIN: ICD-10-CM

## 2020-10-20 DIAGNOSIS — R14.0 BLOATING: Primary | ICD-10-CM

## 2020-10-20 PROCEDURE — 99999 PR PBB SHADOW E&M-EST. PATIENT-LVL III: CPT | Mod: PBBFAC,,, | Performed by: FAMILY MEDICINE

## 2020-10-20 PROCEDURE — 99214 PR OFFICE/OUTPT VISIT, EST, LEVL IV, 30-39 MIN: ICD-10-PCS | Mod: S$PBB,,, | Performed by: FAMILY MEDICINE

## 2020-10-20 PROCEDURE — 99999 PR PBB SHADOW E&M-EST. PATIENT-LVL III: ICD-10-PCS | Mod: PBBFAC,,, | Performed by: FAMILY MEDICINE

## 2020-10-20 PROCEDURE — 99213 OFFICE O/P EST LOW 20 MIN: CPT | Mod: PBBFAC | Performed by: FAMILY MEDICINE

## 2020-10-20 PROCEDURE — 99214 OFFICE O/P EST MOD 30 MIN: CPT | Mod: S$PBB,,, | Performed by: FAMILY MEDICINE

## 2020-10-20 NOTE — PROGRESS NOTES
Subjective:       Patient ID: Vannesa Wilkins is a 29 y.o. female.    Chief Complaint: Abdominal Pain and Bloated    HPI  29 year old female coms in with c/o flair up of her lower back pain after working out and stress in august. Since then, she feels like she hasn't been 'right.' She went to [place] last month and felt like she had serious bloating which was uncharacteristic. She noted bloating worsening throughout the day. She has had several negative pregnancy tests. This has been going on since mid September. She has had intermittent constipation and diarrhea in the meantime. Recently, she had super sharp pains in her pelvis while walking her dog. She should have been past the point of ovulation per her tracking. She has had continued low grade cramping.     PMH, PSH, ALLERGIES, SH, FH reviewed in nurse's notes above  Medications reconciled in the nurse's notes    Review of Systems   Constitutional: Negative for chills and fever.   HENT: Negative for congestion, ear pain, postnasal drip, rhinorrhea, sore throat and trouble swallowing.    Eyes: Negative for redness and itching.   Respiratory: Negative for cough, shortness of breath and wheezing.    Cardiovascular: Negative for chest pain and palpitations.   Gastrointestinal: Positive for abdominal pain, constipation and diarrhea. Negative for nausea and vomiting.   Genitourinary: Negative for dysuria and frequency.   Skin: Negative for rash.   Neurological: Negative for weakness and headaches.       Objective:      Physical Exam  Vitals signs and nursing note reviewed.   Constitutional:       General: She is not in acute distress.     Appearance: She is well-developed.   HENT:      Head: Normocephalic and atraumatic.   Eyes:      Conjunctiva/sclera: Conjunctivae normal.      Pupils: Pupils are equal, round, and reactive to light.   Neck:      Musculoskeletal: Normal range of motion and neck supple.      Thyroid: No thyromegaly.   Cardiovascular:      Rate and  Rhythm: Normal rate and regular rhythm.      Heart sounds: Normal heart sounds.   Pulmonary:      Effort: Pulmonary effort is normal. No respiratory distress.      Breath sounds: Normal breath sounds. No wheezing.   Abdominal:      General: Bowel sounds are normal.      Palpations: Abdomen is soft.      Tenderness: There is no abdominal tenderness.   Musculoskeletal: Normal range of motion.   Lymphadenopathy:      Cervical: No cervical adenopathy.   Skin:     General: Skin is warm and dry.      Findings: No rash.   Neurological:      Mental Status: She is alert and oriented to person, place, and time.   Psychiatric:         Behavior: Behavior normal.          Assessment/Plan:       Problem List Items Addressed This Visit     None      Visit Diagnoses     Bloating    -  Primary    Relevant Orders    US Pelvis Complete Non OB (Completed)    Pelvic pain        Relevant Orders    US Pelvis Complete Non OB (Completed)      right shoulder with recent epidermal inclusion cyst - ruptured, infected, now healing/resolved.    R/o pelvic etiology.    Discussed potential for ibs - reviewed diet, probiotics. If no improvemnet will start on rifaximin.    RTC if condition acutely worsens or any other concerns, otherwise RTC as scheduled

## 2020-10-21 ENCOUNTER — HOSPITAL ENCOUNTER (OUTPATIENT)
Dept: RADIOLOGY | Facility: HOSPITAL | Age: 30
Discharge: HOME | End: 2020-10-21
Attending: FAMILY MEDICINE
Payer: OTHER GOVERNMENT

## 2020-10-21 DIAGNOSIS — R14.0 BLOATING: ICD-10-CM

## 2020-10-21 DIAGNOSIS — R10.2 PELVIC PAIN: ICD-10-CM

## 2020-10-21 PROCEDURE — 76856 US EXAM PELVIC COMPLETE: CPT | Mod: TC

## 2021-03-08 ENCOUNTER — PATIENT MESSAGE (OUTPATIENT)
Dept: ADMINISTRATIVE | Facility: CLINIC | Age: 31
End: 2021-03-08

## 2021-03-10 ENCOUNTER — IMMUNIZATION (OUTPATIENT)
Dept: FAMILY MEDICINE | Facility: CLINIC | Age: 31
End: 2021-03-10
Payer: OTHER GOVERNMENT

## 2021-03-10 DIAGNOSIS — Z23 NEED FOR VACCINATION: Primary | ICD-10-CM

## 2021-03-10 PROCEDURE — 91300 COVID-19, MRNA, LNP-S, PF, 30 MCG/0.3 ML DOSE VACCINE: CPT | Mod: PBBFAC | Performed by: FAMILY MEDICINE

## 2021-03-31 ENCOUNTER — IMMUNIZATION (OUTPATIENT)
Dept: FAMILY MEDICINE | Facility: CLINIC | Age: 31
End: 2021-03-31
Payer: OTHER GOVERNMENT

## 2021-03-31 DIAGNOSIS — Z23 NEED FOR VACCINATION: Primary | ICD-10-CM

## 2021-03-31 PROCEDURE — 0002A COVID-19, MRNA, LNP-S, PF, 30 MCG/0.3 ML DOSE VACCINE: CPT | Mod: PBBFAC | Performed by: FAMILY MEDICINE

## 2021-03-31 PROCEDURE — 91300 COVID-19, MRNA, LNP-S, PF, 30 MCG/0.3 ML DOSE VACCINE: CPT | Mod: PBBFAC | Performed by: FAMILY MEDICINE

## 2021-04-01 ENCOUNTER — NURSE TRIAGE (OUTPATIENT)
Dept: ADMINISTRATIVE | Facility: CLINIC | Age: 31
End: 2021-04-01

## 2021-04-01 ENCOUNTER — PATIENT MESSAGE (OUTPATIENT)
Dept: ADMINISTRATIVE | Facility: OTHER | Age: 31
End: 2021-04-01

## 2021-04-19 ENCOUNTER — PATIENT MESSAGE (OUTPATIENT)
Dept: FAMILY MEDICINE | Facility: CLINIC | Age: 31
End: 2021-04-19

## 2021-04-20 ENCOUNTER — OFFICE VISIT (OUTPATIENT)
Dept: FAMILY MEDICINE | Facility: CLINIC | Age: 31
End: 2021-04-20
Payer: OTHER GOVERNMENT

## 2021-04-20 VITALS
HEART RATE: 72 BPM | HEIGHT: 65 IN | BODY MASS INDEX: 28.3 KG/M2 | DIASTOLIC BLOOD PRESSURE: 90 MMHG | WEIGHT: 169.88 LBS | RESPIRATION RATE: 12 BRPM | SYSTOLIC BLOOD PRESSURE: 152 MMHG

## 2021-04-20 DIAGNOSIS — R59.0 SUPRACLAVICULAR LYMPHADENOPATHY: Primary | ICD-10-CM

## 2021-04-20 DIAGNOSIS — F41.1 GAD (GENERALIZED ANXIETY DISORDER): ICD-10-CM

## 2021-04-20 DIAGNOSIS — F41.0 PANIC DISORDER: ICD-10-CM

## 2021-04-20 PROCEDURE — 99999 PR PBB SHADOW E&M-EST. PATIENT-LVL III: CPT | Mod: PBBFAC,,, | Performed by: FAMILY MEDICINE

## 2021-04-20 PROCEDURE — 99213 OFFICE O/P EST LOW 20 MIN: CPT | Mod: PBBFAC | Performed by: FAMILY MEDICINE

## 2021-04-20 PROCEDURE — 99214 PR OFFICE/OUTPT VISIT, EST, LEVL IV, 30-39 MIN: ICD-10-PCS | Mod: S$PBB,,, | Performed by: FAMILY MEDICINE

## 2021-04-20 PROCEDURE — 99999 PR PBB SHADOW E&M-EST. PATIENT-LVL III: ICD-10-PCS | Mod: PBBFAC,,, | Performed by: FAMILY MEDICINE

## 2021-04-20 PROCEDURE — 99214 OFFICE O/P EST MOD 30 MIN: CPT | Mod: S$PBB,,, | Performed by: FAMILY MEDICINE

## 2021-04-20 RX ORDER — AMITRIPTYLINE HYDROCHLORIDE 25 MG/1
25 TABLET, FILM COATED ORAL NIGHTLY
Qty: 30 TABLET | Refills: 2 | Status: SHIPPED | OUTPATIENT
Start: 2021-04-20 | End: 2022-04-27

## 2021-04-20 RX ORDER — BUSPIRONE HYDROCHLORIDE 5 MG/1
5 TABLET ORAL 3 TIMES DAILY PRN
Qty: 30 TABLET | Refills: 0 | Status: SHIPPED | OUTPATIENT
Start: 2021-04-20 | End: 2022-07-13

## 2021-04-22 ENCOUNTER — HOSPITAL ENCOUNTER (OUTPATIENT)
Dept: RADIOLOGY | Facility: HOSPITAL | Age: 31
Discharge: HOME | End: 2021-04-22
Attending: FAMILY MEDICINE
Payer: OTHER GOVERNMENT

## 2021-04-22 DIAGNOSIS — R59.0 SUPRACLAVICULAR LYMPHADENOPATHY: ICD-10-CM

## 2021-04-22 PROCEDURE — 76536 US EXAM OF HEAD AND NECK: CPT | Mod: 26,,, | Performed by: RADIOLOGY

## 2021-04-22 PROCEDURE — 76536 US SOFT TISSUE HEAD NECK THYROID: ICD-10-PCS | Mod: 26,,, | Performed by: RADIOLOGY

## 2021-04-22 PROCEDURE — 76536 US EXAM OF HEAD AND NECK: CPT | Mod: TC

## 2021-04-23 ENCOUNTER — PATIENT MESSAGE (OUTPATIENT)
Dept: FAMILY MEDICINE | Facility: CLINIC | Age: 31
End: 2021-04-23

## 2021-04-26 ENCOUNTER — PATIENT MESSAGE (OUTPATIENT)
Dept: FAMILY MEDICINE | Facility: CLINIC | Age: 31
End: 2021-04-26

## 2021-06-01 ENCOUNTER — OFFICE VISIT (OUTPATIENT)
Dept: FAMILY MEDICINE | Facility: CLINIC | Age: 31
End: 2021-06-01
Payer: OTHER GOVERNMENT

## 2021-06-01 VITALS
HEART RATE: 88 BPM | DIASTOLIC BLOOD PRESSURE: 70 MMHG | SYSTOLIC BLOOD PRESSURE: 112 MMHG | WEIGHT: 167.31 LBS | BODY MASS INDEX: 27.88 KG/M2 | HEIGHT: 65 IN | RESPIRATION RATE: 12 BRPM

## 2021-06-01 DIAGNOSIS — R19.7 DIARRHEA, UNSPECIFIED TYPE: ICD-10-CM

## 2021-06-01 DIAGNOSIS — R59.0 SUPRACLAVICULAR LYMPHADENOPATHY: Primary | ICD-10-CM

## 2021-06-01 PROCEDURE — 99214 OFFICE O/P EST MOD 30 MIN: CPT | Mod: PBBFAC | Performed by: FAMILY MEDICINE

## 2021-06-01 PROCEDURE — 99999 PR PBB SHADOW E&M-EST. PATIENT-LVL IV: CPT | Mod: PBBFAC,,, | Performed by: FAMILY MEDICINE

## 2021-06-01 PROCEDURE — 99214 OFFICE O/P EST MOD 30 MIN: CPT | Mod: S$PBB,,, | Performed by: FAMILY MEDICINE

## 2021-06-01 PROCEDURE — 99214 PR OFFICE/OUTPT VISIT, EST, LEVL IV, 30-39 MIN: ICD-10-PCS | Mod: S$PBB,,, | Performed by: FAMILY MEDICINE

## 2021-06-01 PROCEDURE — 99999 PR PBB SHADOW E&M-EST. PATIENT-LVL IV: ICD-10-PCS | Mod: PBBFAC,,, | Performed by: FAMILY MEDICINE

## 2021-08-13 ENCOUNTER — PATIENT MESSAGE (OUTPATIENT)
Dept: FAMILY MEDICINE | Facility: CLINIC | Age: 31
End: 2021-08-13

## 2021-08-14 RX ORDER — MUPIROCIN 20 MG/G
OINTMENT TOPICAL 2 TIMES DAILY
Qty: 30 G | Refills: 0 | Status: SHIPPED | OUTPATIENT
Start: 2021-08-14 | End: 2021-08-17

## 2021-08-21 ENCOUNTER — PATIENT MESSAGE (OUTPATIENT)
Dept: FAMILY MEDICINE | Facility: CLINIC | Age: 31
End: 2021-08-21

## 2021-08-24 RX ORDER — CEPHALEXIN 500 MG/1
500 CAPSULE ORAL EVERY 8 HOURS
Qty: 21 CAPSULE | Refills: 0 | Status: SHIPPED | OUTPATIENT
Start: 2021-08-24 | End: 2021-08-31

## 2021-11-18 ENCOUNTER — PATIENT MESSAGE (OUTPATIENT)
Dept: FAMILY MEDICINE | Facility: CLINIC | Age: 31
End: 2021-11-18
Payer: OTHER GOVERNMENT

## 2022-03-01 ENCOUNTER — PATIENT MESSAGE (OUTPATIENT)
Dept: FAMILY MEDICINE | Facility: CLINIC | Age: 32
End: 2022-03-01
Payer: OTHER GOVERNMENT

## 2022-03-01 ENCOUNTER — NURSE TRIAGE (OUTPATIENT)
Dept: ADMINISTRATIVE | Facility: CLINIC | Age: 32
End: 2022-03-01
Payer: OTHER GOVERNMENT

## 2022-03-01 NOTE — TELEPHONE ENCOUNTER
"    Reason for Disposition   Blood in or on bowel movement is main symptom   [1] MODERATE rectal bleeding (small blood clots, passing blood without stool, or toilet water turns red) AND [2] more than once a day    Additional Information   Negative: Shock suspected (e.g., cold/pale/clammy skin, too weak to stand, low BP, rapid pulse)   Negative: Difficult to awaken or acting confused (e.g., disoriented, slurred speech)   Negative: Passed out (i.e., lost consciousness, collapsed and was not responding)   Negative: [1] Vomiting AND [2] contains red blood or black ("coffee ground") material  (Exception: few red streaks in vomit that only happened once)   Negative: Sounds like a life-threatening emergency to the triager   Negative: SEVERE rectal bleeding (large blood clots; on and off, or constant bleeding)   Negative: SEVERE dizziness (e.g., unable to stand, requires support to walk, feels like passing out now)    Protocols used: RECTAL SYMPTOMS-A-AH, RECTAL BLEEDING-A-AH    Pt stated she has rectal bleeding for the past two days. Stated she noticed she is more fatigue with mild dizziness. Pt advised to go to the nearest ED for evaluation and not to drive. Pt verbalized understanding.  "

## 2022-03-02 NOTE — TELEPHONE ENCOUNTER
Spoke w/ about f/u ER visit. And pt experience gave number to patient experience coordinator to handle the situation further.

## 2022-03-21 ENCOUNTER — PATIENT MESSAGE (OUTPATIENT)
Dept: FAMILY MEDICINE | Facility: CLINIC | Age: 32
End: 2022-03-21
Payer: OTHER GOVERNMENT

## 2022-04-27 ENCOUNTER — OFFICE VISIT (OUTPATIENT)
Dept: FAMILY MEDICINE | Facility: CLINIC | Age: 32
End: 2022-04-27
Payer: OTHER GOVERNMENT

## 2022-04-27 VITALS
DIASTOLIC BLOOD PRESSURE: 76 MMHG | RESPIRATION RATE: 12 BRPM | SYSTOLIC BLOOD PRESSURE: 122 MMHG | HEIGHT: 64 IN | HEART RATE: 80 BPM | BODY MASS INDEX: 28.72 KG/M2

## 2022-04-27 DIAGNOSIS — R74.01 ELEVATED AST (SGOT): ICD-10-CM

## 2022-04-27 DIAGNOSIS — R00.2 PALPITATION: Primary | ICD-10-CM

## 2022-04-27 DIAGNOSIS — K08.89 PAIN, DENTAL: ICD-10-CM

## 2022-04-27 DIAGNOSIS — Z30.09 FAMILY PLANNING: ICD-10-CM

## 2022-04-27 DIAGNOSIS — F41.9 ANXIETY: ICD-10-CM

## 2022-04-27 PROCEDURE — 93010 EKG 12-LEAD: ICD-10-PCS | Mod: S$PBB,,, | Performed by: INTERNAL MEDICINE

## 2022-04-27 PROCEDURE — 93005 ELECTROCARDIOGRAM TRACING: CPT | Mod: PBBFAC | Performed by: INTERNAL MEDICINE

## 2022-04-27 PROCEDURE — 93010 ELECTROCARDIOGRAM REPORT: CPT | Mod: S$PBB,,, | Performed by: INTERNAL MEDICINE

## 2022-04-27 PROCEDURE — 99999 PR PBB SHADOW E&M-EST. PATIENT-LVL III: ICD-10-PCS | Mod: PBBFAC,,, | Performed by: FAMILY MEDICINE

## 2022-04-27 PROCEDURE — 99214 OFFICE O/P EST MOD 30 MIN: CPT | Mod: S$PBB,,, | Performed by: FAMILY MEDICINE

## 2022-04-27 PROCEDURE — 99214 PR OFFICE/OUTPT VISIT, EST, LEVL IV, 30-39 MIN: ICD-10-PCS | Mod: S$PBB,,, | Performed by: FAMILY MEDICINE

## 2022-04-27 PROCEDURE — 99999 PR PBB SHADOW E&M-EST. PATIENT-LVL III: CPT | Mod: PBBFAC,,, | Performed by: FAMILY MEDICINE

## 2022-04-27 PROCEDURE — 99213 OFFICE O/P EST LOW 20 MIN: CPT | Mod: PBBFAC | Performed by: FAMILY MEDICINE

## 2022-04-27 RX ORDER — IBUPROFEN 600 MG/1
600 TABLET ORAL 3 TIMES DAILY
Qty: 20 TABLET | Refills: 0 | Status: SHIPPED | OUTPATIENT
Start: 2022-04-27 | End: 2022-05-03

## 2022-04-27 NOTE — PROGRESS NOTES
Subjective:       Patient ID: Vannesa Wilkins is a 31 y.o. female.    Chief Complaint: Annual Exam    HPI  31 year old female comes in for annual visit. She has had several issues since being here last. Her skin issues have completely resolved - though the infection was traumatic for her. She notes that she had an episode of bright red stool - which later on was diagnosed as food generated - beet intake. At that ER visit she had an elevated AST. She notes that she has been taking an increased amount of tylenol lately because of tooth pain. She notes taht she has been seeing the dentist and was offered ultram. More concerning than her tooth pain is the fact that she has increased hr and palpitations. She has had issues with pain in her jaw and neck as well.     PMH, PSH, ALLERGIES, SH, FH reviewed in nurse's notes above  Medications reconciled in the nurse's notes      Review of Systems   Constitutional: Negative for chills and fever.   HENT: Positive for dental problem. Negative for congestion, ear pain, postnasal drip, rhinorrhea, sore throat and trouble swallowing.    Eyes: Negative for redness and itching.   Respiratory: Negative for cough, shortness of breath and wheezing.    Cardiovascular: Positive for palpitations. Negative for chest pain.   Gastrointestinal: Negative for abdominal pain, diarrhea, nausea and vomiting.   Genitourinary: Negative for dysuria and frequency.   Skin: Negative for rash.   Neurological: Negative for weakness and headaches.       Objective:      Physical Exam  Vitals and nursing note reviewed.   Constitutional:       General: She is not in acute distress.     Appearance: She is well-developed.   HENT:      Head: Normocephalic and atraumatic.   Eyes:      Conjunctiva/sclera: Conjunctivae normal.      Pupils: Pupils are equal, round, and reactive to light.   Neck:      Thyroid: No thyromegaly.   Cardiovascular:      Rate and Rhythm: Normal rate and regular rhythm.      Heart sounds: Normal  heart sounds.   Pulmonary:      Effort: Pulmonary effort is normal. No respiratory distress.      Breath sounds: Normal breath sounds. No wheezing.   Abdominal:      General: Bowel sounds are normal.      Palpations: Abdomen is soft.      Tenderness: There is no abdominal tenderness.   Musculoskeletal:         General: Normal range of motion.      Cervical back: Normal range of motion and neck supple.   Lymphadenopathy:      Cervical: No cervical adenopathy.   Skin:     General: Skin is warm and dry.      Findings: No rash.   Neurological:      Mental Status: She is alert and oriented to person, place, and time.   Psychiatric:         Behavior: Behavior normal.          Assessment/Plan:       Problem List Items Addressed This Visit    None     Visit Diagnoses     Palpitation    -  Primary    Relevant Orders    EKG 12-lead    Holter monitor - 48 hour    Pain, dental        Relevant Medications    ibuprofen (ADVIL,MOTRIN) 600 MG tablet    Elevated AST (SGOT)        Anxiety        Family planning          RTC if condition acutely worsens or any other concerns, otherwise RTC as scheduled    39 minutes spent with patient.

## 2022-04-29 ENCOUNTER — TELEPHONE (OUTPATIENT)
Dept: NEUROSURGERY | Facility: CLINIC | Age: 32
End: 2022-04-29
Payer: OTHER GOVERNMENT

## 2022-04-29 ENCOUNTER — PATIENT MESSAGE (OUTPATIENT)
Dept: FAMILY MEDICINE | Facility: CLINIC | Age: 32
End: 2022-04-29
Payer: OTHER GOVERNMENT

## 2022-04-29 DIAGNOSIS — G50.0 TRIGEMINAL NEURALGIA: ICD-10-CM

## 2022-04-29 DIAGNOSIS — G93.5 CHIARI MALFORMATION TYPE I: Primary | ICD-10-CM

## 2022-05-03 DIAGNOSIS — K08.89 PAIN, DENTAL: ICD-10-CM

## 2022-05-03 RX ORDER — IBUPROFEN 600 MG/1
TABLET ORAL
Qty: 20 TABLET | Refills: 0 | OUTPATIENT
Start: 2022-05-03

## 2022-06-01 ENCOUNTER — OFFICE VISIT (OUTPATIENT)
Dept: NEUROSURGERY | Facility: CLINIC | Age: 32
End: 2022-06-01
Payer: OTHER GOVERNMENT

## 2022-06-01 VITALS
HEIGHT: 64 IN | OXYGEN SATURATION: 99 % | WEIGHT: 167 LBS | HEART RATE: 89 BPM | BODY MASS INDEX: 28.51 KG/M2 | DIASTOLIC BLOOD PRESSURE: 84 MMHG | SYSTOLIC BLOOD PRESSURE: 146 MMHG

## 2022-06-01 DIAGNOSIS — M54.16 LUMBAR BACK PAIN WITH RADICULOPATHY AFFECTING LOWER EXTREMITY: ICD-10-CM

## 2022-06-01 DIAGNOSIS — G93.5 CHIARI MALFORMATION TYPE I: Primary | ICD-10-CM

## 2022-06-01 DIAGNOSIS — R51.9 FACIAL PAIN: ICD-10-CM

## 2022-06-01 PROCEDURE — 99204 PR OFFICE/OUTPT VISIT, NEW, LEVL IV, 45-59 MIN: ICD-10-PCS | Mod: S$PBB,,, | Performed by: PHYSICIAN ASSISTANT

## 2022-06-01 PROCEDURE — 99999 PR PBB SHADOW E&M-EST. PATIENT-LVL III: ICD-10-PCS | Mod: PBBFAC,,, | Performed by: PHYSICIAN ASSISTANT

## 2022-06-01 PROCEDURE — 99213 OFFICE O/P EST LOW 20 MIN: CPT | Mod: PBBFAC | Performed by: PHYSICIAN ASSISTANT

## 2022-06-01 PROCEDURE — 99999 PR PBB SHADOW E&M-EST. PATIENT-LVL III: CPT | Mod: PBBFAC,,, | Performed by: PHYSICIAN ASSISTANT

## 2022-06-01 PROCEDURE — 99204 OFFICE O/P NEW MOD 45 MIN: CPT | Mod: S$PBB,,, | Performed by: PHYSICIAN ASSISTANT

## 2022-06-01 NOTE — PROGRESS NOTES
Neurosurgery  History & Physical    SUBJECTIVE:     Chief Complaint: facial pain    History of Present Illness:  30 yo female with a history of seizures and chiari malformation who presents referred by her Dentist for evaluation of trigeminal neuralgia. She reports that she began experiencing left facial pain that started at the ear and radiated along the jaw as well as tooth pain on that side that was so severe it woke her up at night. The pain was improved with ibuprofen and worsened with hot/cold sensations. She had some dental work in April with a cavity filling and reports the severe pain has not returned since that time, although she continues to take ibuprofen daily. She denies facial droop or facial numbness.     She also reports she was diagnosed with chiari malformation when she was 16 but has not established care for this. She has a history of migraines but denies occipital HA or valsalva HA. She denies any neck pain, radicular arm pain, numbness, weakness. She does intermittent neck pain but states she does Crossfit regularly which she thinks contributes to muscle tension. She denies any myelopathic symptoms.     Review of patient's allergies indicates:  No Known Allergies    Current Outpatient Medications   Medication Sig Dispense Refill    ibuprofen (ADVIL,MOTRIN) 600 MG tablet TAKE 1 TABLET(600 MG) BY MOUTH THREE TIMES DAILY 20 tablet 0    busPIRone (BUSPAR) 5 MG Tab Take 1 tablet (5 mg total) by mouth 3 (three) times daily as needed (anxiety). 30 tablet 0     No current facility-administered medications for this visit.       Past Medical History:   Diagnosis Date    Seizures 2007    Grand mal once  EEG  photot sensitivty      Past Surgical History:   Procedure Laterality Date    KNEE ARTHROSCOPY W/ DEBRIDEMENT Left 2004    MCL tear      Family History    None       Social History     Socioeconomic History    Marital status:    Tobacco Use    Smoking status: Never Smoker    Smokeless  "tobacco: Never Used   Substance and Sexual Activity    Alcohol use: No    Drug use: Yes     Types: Methamphetamines    Sexual activity: Yes     Partners: Male       Review of Systems   Constitutional: Negative for activity change, diaphoresis, fatigue, fever and unexpected weight change.   Eyes: Negative for visual disturbance.   Respiratory: Negative for cough, shortness of breath and wheezing.    Cardiovascular: Negative for chest pain and palpitations.   Gastrointestinal: Negative for abdominal distention, abdominal pain, blood in stool, constipation, diarrhea, nausea and vomiting.   Genitourinary: Negative for difficulty urinating, enuresis, frequency and urgency.   Musculoskeletal: Positive for neck pain and neck stiffness. Negative for back pain, gait problem, joint swelling and myalgias.   Skin: Negative for color change, rash and wound.   Neurological: Negative for dizziness, seizures, facial asymmetry, speech difficulty, weakness, light-headedness, numbness and headaches.        Shooting facial pain       OBJECTIVE:     Vital Signs  Pulse: 89  BP: (!) 146/84  SpO2: 99 %  Pain Score: 0-No pain  Height: 5' 4" (162.6 cm)  Weight: 75.8 kg (167 lb)  Body mass index is 28.67 kg/m².      Neurosurgery Physical Exam  General: well developed, well nourished, no distress.   Head: normocephalic, atraumatic  Neurologic: Alert and oriented. Thought content appropriate.  GCS: Motor: 6/Verbal: 5/Eyes: 4 GCS Total: 15  Mental Status: Awake, Alert, Oriented x3  Cranial nerves: face symmetric, tongue midline, CN II-XII grossly intact.   Eyes: pupils equal, round, reactive to light with accomodation, EOMI.    Sensory: intact to light touch throughout  Motor Strength:Moves all extremities spontaneously with good tone.  Full strength upper and lower extremities. No abnormal movements seen.     Strength  Deltoids Triceps Biceps Wrist Extension Wrist Flexion Hand    Upper: R 5/5 5/5 5/5 5/5 5/5 5/5    L 5/5 5/5 5/5 5/5 5/5 " 5/5     Iliopsoas Quadriceps Knee  Flexion Tibialis  anterior Gastro- cnemius EHL   Lower: R 5/5 5/5 5/5 5/5 5/5 5/5    L 5/5 5/5 5/5 5/5 5/5 5/5     DTR's - 2 + and symmetric in UE and LE  Pronator Drift: no drift noted  Finger-to-nose: Intact bilaterally  Johns: absent  Clonus: absent  Pulses: 2+ and symmetric radial and dorsalis pedis. No lower extremity edema  Straight leg raise: negative  Gait: normal  Tandem Gait: No difficulty         Able to walk on heels & toes  Cervical ROM: full  Lumbar ROM: full     Diagnostic Results:  None new     ASSESSMENT/PLAN:     30 yo female with a history of migraines, seizures and chiari malformation who presents as a referral for facial pain. The pain has improved since her recent dental work, however since it has not resolved, we will obtain MRI W WO contrast for further evaluation. I will also obtain MRI cervical spine without contrast give her history of chiari malformation to establish a baseline. I will see her back in clinic once the above imaging is complete.       Sandra Bronson PA-C  Neurosurgery    Note dictated with voice recognition software, please excuse any grammatical errors.

## 2022-06-06 ENCOUNTER — PATIENT MESSAGE (OUTPATIENT)
Dept: FAMILY MEDICINE | Facility: CLINIC | Age: 32
End: 2022-06-06
Payer: OTHER GOVERNMENT

## 2022-06-06 NOTE — TELEPHONE ENCOUNTER
Follow up on patient's visit from 4/27.   Patient is still having palpatations but did test positive from covid shortly after visit.   Patient was able to follow up with neurologist and state that they think the tooth pain is more dental related and not TN.     Patient is is still having periods of palpitations without exertion.     Please advise.

## 2022-06-07 NOTE — TELEPHONE ENCOUNTER
PT states tooth was fixed and more intense at night so takes 400 ibuprofen before bed and only needing 400-800 mg a day to manage it. More of an ache then the shocking pain. Pain worse after workouts and in the morning. Did make appt with new dentist for 2nd opinion next week.    States heart rate feels more erratic during exercising but doesn't bother her. Worse when laying down almost like a pounding feeling. During covid it was worse where she had to sleep sitting up.

## 2022-07-05 ENCOUNTER — HOSPITAL ENCOUNTER (OUTPATIENT)
Dept: RADIOLOGY | Facility: HOSPITAL | Age: 32
Discharge: HOME | End: 2022-07-05
Attending: PHYSICIAN ASSISTANT
Payer: OTHER GOVERNMENT

## 2022-07-05 ENCOUNTER — PATIENT MESSAGE (OUTPATIENT)
Dept: FAMILY MEDICINE | Facility: CLINIC | Age: 32
End: 2022-07-05
Payer: OTHER GOVERNMENT

## 2022-07-05 DIAGNOSIS — R51.9 FACIAL PAIN: ICD-10-CM

## 2022-07-05 DIAGNOSIS — G93.5 CHIARI MALFORMATION TYPE I: ICD-10-CM

## 2022-07-05 PROCEDURE — 72141 MRI NECK SPINE W/O DYE: CPT | Mod: 26,,, | Performed by: RADIOLOGY

## 2022-07-05 PROCEDURE — 70553 MRI BRAIN STEM W/O & W/DYE: CPT | Mod: 26,,, | Performed by: RADIOLOGY

## 2022-07-05 PROCEDURE — 72141 MRI CERVICAL SPINE WITHOUT CONTRAST: ICD-10-PCS | Mod: 26,,, | Performed by: RADIOLOGY

## 2022-07-05 PROCEDURE — 72141 MRI NECK SPINE W/O DYE: CPT | Mod: TC

## 2022-07-05 PROCEDURE — 70553 MRI BRAIN W WO CONTRAST: ICD-10-PCS | Mod: 26,,, | Performed by: RADIOLOGY

## 2022-07-05 PROCEDURE — 70553 MRI BRAIN STEM W/O & W/DYE: CPT | Mod: TC

## 2022-07-05 PROCEDURE — 25500020 PHARM REV CODE 255: Performed by: PHYSICIAN ASSISTANT

## 2022-07-05 PROCEDURE — A9585 GADOBUTROL INJECTION: HCPCS | Performed by: PHYSICIAN ASSISTANT

## 2022-07-05 RX ORDER — GADOBUTROL 604.72 MG/ML
8 INJECTION INTRAVENOUS
Status: COMPLETED | OUTPATIENT
Start: 2022-07-05 | End: 2022-07-05

## 2022-07-05 RX ADMIN — GADOBUTROL 8 ML: 604.72 INJECTION INTRAVENOUS at 02:07

## 2022-07-11 ENCOUNTER — PATIENT MESSAGE (OUTPATIENT)
Dept: ADMINISTRATIVE | Facility: HOSPITAL | Age: 32
End: 2022-07-11
Payer: OTHER GOVERNMENT

## 2022-07-13 ENCOUNTER — OFFICE VISIT (OUTPATIENT)
Dept: NEUROSURGERY | Facility: CLINIC | Age: 32
End: 2022-07-13
Payer: OTHER GOVERNMENT

## 2022-07-13 VITALS
TEMPERATURE: 99 F | HEART RATE: 88 BPM | SYSTOLIC BLOOD PRESSURE: 129 MMHG | BODY MASS INDEX: 28.67 KG/M2 | DIASTOLIC BLOOD PRESSURE: 82 MMHG | HEIGHT: 64 IN

## 2022-07-13 DIAGNOSIS — G93.5 CHIARI MALFORMATION TYPE I: Primary | ICD-10-CM

## 2022-07-13 PROCEDURE — 99999 PR PBB SHADOW E&M-EST. PATIENT-LVL III: ICD-10-PCS | Mod: PBBFAC,,, | Performed by: PHYSICIAN ASSISTANT

## 2022-07-13 PROCEDURE — 99214 PR OFFICE/OUTPT VISIT, EST, LEVL IV, 30-39 MIN: ICD-10-PCS | Mod: S$PBB,,, | Performed by: PHYSICIAN ASSISTANT

## 2022-07-13 PROCEDURE — 99999 PR PBB SHADOW E&M-EST. PATIENT-LVL III: CPT | Mod: PBBFAC,,, | Performed by: PHYSICIAN ASSISTANT

## 2022-07-13 PROCEDURE — 99213 OFFICE O/P EST LOW 20 MIN: CPT | Mod: PBBFAC | Performed by: PHYSICIAN ASSISTANT

## 2022-07-13 PROCEDURE — 99214 OFFICE O/P EST MOD 30 MIN: CPT | Mod: S$PBB,,, | Performed by: PHYSICIAN ASSISTANT

## 2022-07-13 NOTE — PROGRESS NOTES
Neurosurgery  Established Patient     SUBJECTIVE:     History of Present Illness:  30 yo female with a history of seizures and chiari malformation who presents referred by her Dentist for evaluation of trigeminal neuralgia. She reports that she began experiencing left facial pain that started at the ear and radiated along the jaw as well as tooth pain on that side that was so severe it woke her up at night. The pain was improved with ibuprofen and worsened with hot/cold sensations. She had some dental work in April with a cavity filling and reports the severe pain has not returned since that time, although she continues to take ibuprofen daily. She denies facial droop or facial numbness.     She also reports she was diagnosed with chiari malformation when she was 16 but has not established care for this. She has a history of migraines but denies occipital HA or valsalva HA. She denies any neck pain, radicular arm pain, numbness, weakness. She does intermittent neck pain but states she does Crossfit regularly which she thinks contributes to muscle tension. She denies any myelopathic symptoms.     Interval history:   Patient presents today for follow-up with MRI cervical spine and MRI brain with and without contrast.  She reports near resolution of her facial pain since last visit.  She also denies any new weakness, numbness, tingling, occipital HA, neck pain or myelopathic complaints. MRI cervical spine shows chiari I malformation without syrinx     Review of patient's allergies indicates:  No Known Allergies    Current Outpatient Medications   Medication Sig Dispense Refill    ibuprofen (ADVIL,MOTRIN) 600 MG tablet TAKE 1 TABLET(600 MG) BY MOUTH THREE TIMES DAILY (Patient not taking: Reported on 7/13/2022) 20 tablet 0     No current facility-administered medications for this visit.       Past Medical History:   Diagnosis Date    Seizures 2007    Grand mal once  EEG  photot sensitivty      Past Surgical History:  "  Procedure Laterality Date    KNEE ARTHROSCOPY W/ DEBRIDEMENT Left 2004    MCL tear      Family History    None       Social History     Socioeconomic History    Marital status:    Tobacco Use    Smoking status: Never Smoker    Smokeless tobacco: Never Used   Substance and Sexual Activity    Alcohol use: No    Drug use: Yes     Types: Methamphetamines    Sexual activity: Yes     Partners: Male       Review of Systems   Constitutional: Negative for activity change, diaphoresis, fatigue, fever and unexpected weight change.   Eyes: Negative for visual disturbance.   Respiratory: Negative for cough, shortness of breath and wheezing.    Cardiovascular: Negative for chest pain and palpitations.   Gastrointestinal: Negative for abdominal distention, abdominal pain, blood in stool, constipation, diarrhea, nausea and vomiting.   Genitourinary: Negative for difficulty urinating, enuresis, frequency and urgency.   Musculoskeletal: Positive for neck pain and neck stiffness. Negative for back pain, gait problem, joint swelling and myalgias.   Skin: Negative for color change, rash and wound.   Neurological: Negative for dizziness, seizures, facial asymmetry, speech difficulty, weakness, light-headedness, numbness and headaches.        Shooting facial pain       OBJECTIVE:     Vital Signs  Temp: 98.6 °F (37 °C)  Pulse: 88  BP: 129/82  Pain Score: 0-No pain  Height: 5' 4" (162.6 cm)  Body mass index is 28.67 kg/m².      Neurosurgery Physical Exam  General: well developed, well nourished, no distress.   Head: normocephalic, atraumatic  Neurologic: Alert and oriented. Thought content appropriate.  GCS: Motor: 6/Verbal: 5/Eyes: 4 GCS Total: 15  Mental Status: Awake, Alert, Oriented x3  Cranial nerves: face symmetric, tongue midline, CN II-XII grossly intact.   Eyes: pupils equal, round, reactive to light with accomodation, EOMI.    Sensory: intact to light touch throughout  Motor Strength:Moves all extremities " spontaneously with good tone.  Full strength upper and lower extremities. No abnormal movements seen.     Strength  Deltoids Triceps Biceps Wrist Extension Wrist Flexion Hand    Upper: R 5/5 5/5 5/5 5/5 5/5 5/5    L 5/5 5/5 5/5 5/5 5/5 5/5     Iliopsoas Quadriceps Knee  Flexion Tibialis  anterior Gastro- cnemius EHL   Lower: R 5/5 5/5 5/5 5/5 5/5 5/5    L 5/5 5/5 5/5 5/5 5/5 5/5     DTR's - 2 + and symmetric in UE and LE  Pronator Drift: no drift noted  Finger-to-nose: Intact bilaterally  Johns: absent  Clonus: absent  Pulses: 2+ and symmetric radial and dorsalis pedis. No lower extremity edema  Straight leg raise: negative  Gait: normal  Tandem Gait: No difficulty         Able to walk on heels & toes  Cervical ROM: full  Lumbar ROM: full     Diagnostic Results:   MRI cervical spine shows chiari I malformation without syrinx   MRI Brain W WO contrast without acute intracranial pathology. No evidence for trigeminal enhancement    ASSESSMENT/PLAN:     32 yo female with chiari I malformation who intially presented as a referral for facial pain. The facial pain has improved since her recent dental work. Her MRIs were reviewed with the patient. She continues to be asymptomatic from her chiari malformation. All symptoms and signs that require emergent or urgent treatment were reviewed. She may continue to follow up with us on an annual basis or as needed with any new complaints .       Sandra Bronson PA-C  Neurosurgery    Note dictated with voice recognition software, please excuse any grammatical errors.

## 2022-07-15 ENCOUNTER — PATIENT MESSAGE (OUTPATIENT)
Dept: FAMILY MEDICINE | Facility: CLINIC | Age: 32
End: 2022-07-15
Payer: OTHER GOVERNMENT

## 2022-07-15 NOTE — TELEPHONE ENCOUNTER
Patient questioning if it is appropriate for her to come in office to see you for a nodule that was found on MRI by neurologist. States that her neuro MD told her to follow up with you. Patient questioning because she lives in [place].     Please advise.

## 2022-08-03 ENCOUNTER — CLINICAL SUPPORT (OUTPATIENT)
Dept: FAMILY MEDICINE | Facility: CLINIC | Age: 32
End: 2022-08-03
Payer: OTHER GOVERNMENT

## 2022-08-03 ENCOUNTER — OFFICE VISIT (OUTPATIENT)
Dept: FAMILY MEDICINE | Facility: CLINIC | Age: 32
End: 2022-08-03
Payer: OTHER GOVERNMENT

## 2022-08-03 VITALS
HEIGHT: 64 IN | HEART RATE: 72 BPM | DIASTOLIC BLOOD PRESSURE: 80 MMHG | BODY MASS INDEX: 28.67 KG/M2 | RESPIRATION RATE: 16 BRPM | SYSTOLIC BLOOD PRESSURE: 121 MMHG

## 2022-08-03 DIAGNOSIS — E04.1 THYROID NODULE: Primary | ICD-10-CM

## 2022-08-03 DIAGNOSIS — F41.0 PANIC ATTACK: ICD-10-CM

## 2022-08-03 DIAGNOSIS — E04.1 THYROID NODULE: ICD-10-CM

## 2022-08-03 LAB — TSH SERPL DL<=0.005 MIU/L-ACNC: 1.77 UIU/ML (ref 0.4–4)

## 2022-08-03 PROCEDURE — 84443 ASSAY THYROID STIM HORMONE: CPT | Performed by: FAMILY MEDICINE

## 2022-08-03 PROCEDURE — 99214 PR OFFICE/OUTPT VISIT, EST, LEVL IV, 30-39 MIN: ICD-10-PCS | Mod: S$PBB,,, | Performed by: FAMILY MEDICINE

## 2022-08-03 PROCEDURE — 36415 COLL VENOUS BLD VENIPUNCTURE: CPT | Mod: PBBFAC

## 2022-08-03 PROCEDURE — 99999 PR PBB SHADOW E&M-EST. PATIENT-LVL III: CPT | Mod: PBBFAC,,, | Performed by: FAMILY MEDICINE

## 2022-08-03 PROCEDURE — 99999 PR PBB SHADOW E&M-EST. PATIENT-LVL III: ICD-10-PCS | Mod: PBBFAC,,, | Performed by: FAMILY MEDICINE

## 2022-08-03 PROCEDURE — 99213 OFFICE O/P EST LOW 20 MIN: CPT | Mod: PBBFAC | Performed by: FAMILY MEDICINE

## 2022-08-03 PROCEDURE — 99214 OFFICE O/P EST MOD 30 MIN: CPT | Mod: S$PBB,,, | Performed by: FAMILY MEDICINE

## 2022-08-03 RX ORDER — ALPRAZOLAM 0.25 MG/1
0.25 TABLET ORAL DAILY PRN
Qty: 15 TABLET | Refills: 0 | Status: SHIPPED | OUTPATIENT
Start: 2022-08-03 | End: 2022-11-08

## 2022-08-03 NOTE — PROGRESS NOTES
gold     Ears: no ear pain and no hearing problems.Nose: no nasal congestion and no nasal drainage.Mouth/Throat: no dysphagia, no hoarseness and no throat pain.Neck: no lumps, no pain, no stiffness and no swollen glands.

## 2022-08-03 NOTE — PROGRESS NOTES
Subjective:       Patient ID: Vannesa Wilkins is a 31 y.o. female.    Chief Complaint: Thyroid Problem    HPI  31 year old femcaity come sin with concern for incidental subcentimeter thyroid nodule on MRi.    PMH, PSH, ALLERGIES, SH, FH reviewed in nurse's notes above  Medications reconciled in the nurse's notes    Review of Systems   Constitutional: Negative for chills and fever.   HENT: Negative for congestion, ear pain, postnasal drip, rhinorrhea, sore throat and trouble swallowing.    Eyes: Negative for redness and itching.   Respiratory: Negative for cough, shortness of breath and wheezing.    Cardiovascular: Negative for chest pain and palpitations.   Gastrointestinal: Negative for abdominal pain, diarrhea, nausea and vomiting.   Genitourinary: Negative for dysuria and frequency.   Skin: Negative for rash.   Neurological: Negative for weakness and headaches.       Objective:      Physical Exam  Vitals and nursing note reviewed.   Constitutional:       General: She is not in acute distress.     Appearance: She is well-developed.   HENT:      Head: Normocephalic and atraumatic.   Eyes:      Conjunctiva/sclera: Conjunctivae normal.      Pupils: Pupils are equal, round, and reactive to light.   Neck:      Thyroid: No thyromegaly.   Cardiovascular:      Rate and Rhythm: Normal rate and regular rhythm.      Heart sounds: Normal heart sounds.   Pulmonary:      Effort: Pulmonary effort is normal. No respiratory distress.      Breath sounds: Normal breath sounds. No wheezing.   Abdominal:      General: Bowel sounds are normal.      Palpations: Abdomen is soft.      Tenderness: There is no abdominal tenderness.   Musculoskeletal:         General: Normal range of motion.      Cervical back: Normal range of motion and neck supple.   Lymphadenopathy:      Cervical: No cervical adenopathy.   Skin:     General: Skin is warm and dry.      Findings: No rash.   Neurological:      Mental Status: She is alert and oriented to person,  place, and time.   Psychiatric:         Behavior: Behavior normal.          Assessment/Plan:       Problem List Items Addressed This Visit    None     Visit Diagnoses     Thyroid nodule    -  Primary    Relevant Orders    TSH    Panic attack        Relevant Medications    ALPRAZolam (XANAX) 0.25 MG tablet        RTC if condition acutely worsens or any other concerns, otherwise RTC as scheduled      Will u/s in 6 months.

## 2022-08-22 ENCOUNTER — TELEPHONE (OUTPATIENT)
Dept: OBSTETRICS AND GYNECOLOGY | Facility: CLINIC | Age: 32
End: 2022-08-22
Payer: OTHER GOVERNMENT

## 2022-08-22 NOTE — TELEPHONE ENCOUNTER
Reached out to pt in regards to appt request. Pt stated that she has to cancel appt but would like to still see Dr. dominguez but pt is also concerned about being due for an annual/ pap smear. Advised pt to keep upcoming appt that is scheduled w/ a different physician and scheduled pt for fertility consult. Pt vu and all questions answered

## 2022-09-01 NOTE — PROGRESS NOTES
CC: Well woman exam    Vannesa Wilkins is a 31 y.o. female No obstetric history on file. presents for well woman exam.  LMP: Patient's last menstrual period was 08/17/2022 (exact date)..        4/19 pap normal   Hx abnormal pap  Unsure about Gardasil - she will check her medical records from [place]    Has hx oral ulcers since childhood.  Has appointment with oral surgeon next week.    Hx chiari malformation type 1.  Saw neurosurgery 7/22  Hx grand mal seizure x1.  EEG - photosensitivity.  No seizure meds    Cycles regular, always been heavy.  Intense cramping, ibuprofen helps if she can can get ahead of it.    Has a visit 10/22 scheduled with Dr Almanza for fertility consult    Past Medical History:   Diagnosis Date    Chiari malformation type I     Seizures 2007    Grand mal once  EEG  photot sensitivty      Past Surgical History:   Procedure Laterality Date    KNEE ARTHROSCOPY W/ DEBRIDEMENT Left 2004    MCL tear     Widsom Teeth Extraction Bilateral      Social History     Socioeconomic History    Marital status:    Tobacco Use    Smoking status: Never    Smokeless tobacco: Never   Substance and Sexual Activity    Alcohol use: No    Drug use: Yes     Types: Methamphetamines    Sexual activity: Yes     Partners: Male     Birth control/protection: None     Family History   Problem Relation Age of Onset    Ovarian cancer Neg Hx     Colon cancer Neg Hx     Breast cancer Neg Hx      OB History    No obstetric history on file.         /80 (BP Location: Left arm, Patient Position: Sitting, BP Method: Medium (Manual))   LMP 08/17/2022 (Exact Date)       ROS:  GENERAL: Denies weight gain or weight loss. Feeling well overall.   SKIN: Denies rash or lesions.   HEAD: Denies head injury or headache.   NODES: Denies enlarged lymph nodes.   CHEST: Denies chest pain or shortness of breath.   CARDIOVASCULAR: Denies palpitations or left sided chest pain.   ABDOMEN: No abdominal pain, constipation, diarrhea, nausea,  vomiting or rectal bleeding.   URINARY: No frequency, dysuria, hematuria, or burning on urination.  REPRODUCTIVE: See HPI.   BREASTS: The patient performs breast self-examination and denies pain, lumps, or nipple discharge.   HEMATOLOGIC: No easy bruisability or excessive bleeding.   MUSCULOSKELETAL: Denies joint pain or swelling.   NEUROLOGIC: Denies syncope or weakness.   PSYCHIATRIC: Denies depression, anxiety or mood swings.    PHYSICAL EXAM:  APPEARANCE: Well nourished, well developed, in no acute distress.  AFFECT: WNL, alert and oriented x 3  SKIN: No acne or hirsutism  NECK: Neck symmetric without masses or thyromegaly  NODES: No inguinal, cervical, axillary, or femoral lymph node enlargement  CHEST: Good respiratory effect  ABDOMEN: Soft.  No tenderness or masses.  No hepatosplenomegaly.  No hernias.  BREASTS: Symmetrical, no skin changes or visible lesions.  No palpable masses, nipple discharge bilaterally.  PELVIC: Normal external genitalia without lesions.  Normal hair distribution.  Adequate perineal body, normal urethral meatus.  Vagina moist and well rugated without lesions or discharge.  Cervix pink, without lesions, discharge or tenderness.  No significant cystocele or rectocele.  Bimanual exam shows uterus to be normal size, regular, mobile and nontender.  Adnexa without masses or tenderness.    EXTREMITIES: No edema.      ASSESSMENT & PLAN    ICD-10-CM ICD-9-CM    1. Well woman exam  Z01.419 V72.31 Liquid-Based Pap Smear, Screening      HPV High Risk Genotypes, PCR           F/u as scheduled with Dr Almanza for preconception visit  Patient was counseled today on A.C.S. Pap guidelines and recommendations for yearly pelvic exams, mammograms and monthly self breast exams; to see her PCP for other health maintenance.

## 2022-09-06 ENCOUNTER — OFFICE VISIT (OUTPATIENT)
Dept: OBSTETRICS AND GYNECOLOGY | Facility: CLINIC | Age: 32
End: 2022-09-06
Attending: OBSTETRICS & GYNECOLOGY
Payer: OTHER GOVERNMENT

## 2022-09-06 VITALS — DIASTOLIC BLOOD PRESSURE: 80 MMHG | SYSTOLIC BLOOD PRESSURE: 122 MMHG

## 2022-09-06 DIAGNOSIS — Z01.419 WELL WOMAN EXAM: Primary | ICD-10-CM

## 2022-09-06 PROCEDURE — 99999 PR PBB SHADOW E&M-EST. PATIENT-LVL III: ICD-10-PCS | Mod: PBBFAC,,, | Performed by: OBSTETRICS & GYNECOLOGY

## 2022-09-06 PROCEDURE — 99213 OFFICE O/P EST LOW 20 MIN: CPT | Mod: PBBFAC | Performed by: OBSTETRICS & GYNECOLOGY

## 2022-09-06 PROCEDURE — 87624 HPV HI-RISK TYP POOLED RSLT: CPT | Performed by: OBSTETRICS & GYNECOLOGY

## 2022-09-06 PROCEDURE — 88175 CYTOPATH C/V AUTO FLUID REDO: CPT | Performed by: OBSTETRICS & GYNECOLOGY

## 2022-09-06 PROCEDURE — 99385 PR PREVENTIVE VISIT,NEW,18-39: ICD-10-PCS | Mod: S$PBB,,, | Performed by: OBSTETRICS & GYNECOLOGY

## 2022-09-06 PROCEDURE — 99999 PR PBB SHADOW E&M-EST. PATIENT-LVL III: CPT | Mod: PBBFAC,,, | Performed by: OBSTETRICS & GYNECOLOGY

## 2022-09-06 PROCEDURE — 99385 PREV VISIT NEW AGE 18-39: CPT | Mod: S$PBB,,, | Performed by: OBSTETRICS & GYNECOLOGY

## 2022-09-06 RX ORDER — CHLORHEXIDINE GLUCONATE ORAL RINSE 1.2 MG/ML
SOLUTION DENTAL
COMMUNITY
Start: 2022-08-29 | End: 2022-10-05

## 2022-09-06 RX ORDER — AMOXICILLIN 500 MG/1
500 CAPSULE ORAL EVERY 8 HOURS
COMMUNITY
Start: 2022-08-22 | End: 2022-10-05

## 2022-09-06 RX ORDER — HYDROCODONE BITARTRATE AND ACETAMINOPHEN 5; 325 MG/1; MG/1
1 TABLET ORAL
COMMUNITY
Start: 2022-08-22 | End: 2022-10-05

## 2022-10-05 ENCOUNTER — OFFICE VISIT (OUTPATIENT)
Dept: OBSTETRICS AND GYNECOLOGY | Facility: CLINIC | Age: 32
End: 2022-10-05
Payer: OTHER GOVERNMENT

## 2022-10-05 ENCOUNTER — LAB VISIT (OUTPATIENT)
Dept: LAB | Facility: OTHER | Age: 32
End: 2022-10-05
Attending: STUDENT IN AN ORGANIZED HEALTH CARE EDUCATION/TRAINING PROGRAM
Payer: OTHER GOVERNMENT

## 2022-10-05 VITALS
BODY MASS INDEX: 28.53 KG/M2 | HEIGHT: 64 IN | SYSTOLIC BLOOD PRESSURE: 128 MMHG | DIASTOLIC BLOOD PRESSURE: 86 MMHG | WEIGHT: 167.13 LBS

## 2022-10-05 DIAGNOSIS — Z31.69 ENCOUNTER FOR PRECONCEPTION CONSULTATION: Primary | ICD-10-CM

## 2022-10-05 DIAGNOSIS — Z31.69 ENCOUNTER FOR PRECONCEPTION CONSULTATION: ICD-10-CM

## 2022-10-05 DIAGNOSIS — E04.1 THYROID NODULE: ICD-10-CM

## 2022-10-05 LAB
ALBUMIN SERPL BCP-MCNC: 4.2 G/DL (ref 3.5–5.2)
ALP SERPL-CCNC: 62 U/L (ref 55–135)
ALT SERPL W/O P-5'-P-CCNC: 80 U/L (ref 10–44)
ANION GAP SERPL CALC-SCNC: 11 MMOL/L (ref 8–16)
AST SERPL-CCNC: 34 U/L (ref 10–40)
BILIRUB SERPL-MCNC: 0.3 MG/DL (ref 0.1–1)
BUN SERPL-MCNC: 13 MG/DL (ref 6–20)
CALCIUM SERPL-MCNC: 9.6 MG/DL (ref 8.7–10.5)
CHLORIDE SERPL-SCNC: 105 MMOL/L (ref 95–110)
CO2 SERPL-SCNC: 24 MMOL/L (ref 23–29)
CREAT SERPL-MCNC: 0.9 MG/DL (ref 0.5–1.4)
EST. GFR  (NO RACE VARIABLE): >60 ML/MIN/1.73 M^2
GLUCOSE SERPL-MCNC: 90 MG/DL (ref 70–110)
POTASSIUM SERPL-SCNC: 4.3 MMOL/L (ref 3.5–5.1)
PROT SERPL-MCNC: 7.5 G/DL (ref 6–8.4)
SODIUM SERPL-SCNC: 140 MMOL/L (ref 136–145)

## 2022-10-05 PROCEDURE — 99999 PR PBB SHADOW E&M-EST. PATIENT-LVL III: CPT | Mod: PBBFAC,,, | Performed by: STUDENT IN AN ORGANIZED HEALTH CARE EDUCATION/TRAINING PROGRAM

## 2022-10-05 PROCEDURE — 99215 OFFICE O/P EST HI 40 MIN: CPT | Mod: S$PBB,,, | Performed by: STUDENT IN AN ORGANIZED HEALTH CARE EDUCATION/TRAINING PROGRAM

## 2022-10-05 PROCEDURE — 36415 COLL VENOUS BLD VENIPUNCTURE: CPT | Performed by: STUDENT IN AN ORGANIZED HEALTH CARE EDUCATION/TRAINING PROGRAM

## 2022-10-05 PROCEDURE — 99999 PR PBB SHADOW E&M-EST. PATIENT-LVL III: ICD-10-PCS | Mod: PBBFAC,,, | Performed by: STUDENT IN AN ORGANIZED HEALTH CARE EDUCATION/TRAINING PROGRAM

## 2022-10-05 PROCEDURE — 99213 OFFICE O/P EST LOW 20 MIN: CPT | Mod: PBBFAC | Performed by: STUDENT IN AN ORGANIZED HEALTH CARE EDUCATION/TRAINING PROGRAM

## 2022-10-05 PROCEDURE — 99215 PR OFFICE/OUTPT VISIT, EST, LEVL V, 40-54 MIN: ICD-10-PCS | Mod: S$PBB,,, | Performed by: STUDENT IN AN ORGANIZED HEALTH CARE EDUCATION/TRAINING PROGRAM

## 2022-10-05 PROCEDURE — 86762 RUBELLA ANTIBODY: CPT | Performed by: STUDENT IN AN ORGANIZED HEALTH CARE EDUCATION/TRAINING PROGRAM

## 2022-10-05 PROCEDURE — 86787 VARICELLA-ZOSTER ANTIBODY: CPT | Performed by: STUDENT IN AN ORGANIZED HEALTH CARE EDUCATION/TRAINING PROGRAM

## 2022-10-05 PROCEDURE — 81329 SMN1 GENE DOS/DELETION ALYS: CPT | Performed by: STUDENT IN AN ORGANIZED HEALTH CARE EDUCATION/TRAINING PROGRAM

## 2022-10-05 PROCEDURE — 80053 COMPREHEN METABOLIC PANEL: CPT | Performed by: STUDENT IN AN ORGANIZED HEALTH CARE EDUCATION/TRAINING PROGRAM

## 2022-10-06 LAB
RUBV IGG SER-ACNC: 19.8 IU/ML
RUBV IGG SER-IMP: REACTIVE
VARICELLA INTERPRETATION: NEGATIVE
VARICELLA ZOSTER IGG: 0.36 ISR (ref 0–0.9)

## 2022-10-06 NOTE — PROGRESS NOTES
History & Physical  Gynecology      SUBJECTIVE:     Chief Complaint: Consult (Pt ttc )       History of Present Illness:  Vannesa Wilkins is a 31 y.o.  here for pre-conception visit. New patient to me. She and her  have very recently began actively trying to conceive (within last month).   PMH:   - chiari I: incidental diagnosis, saw NSG earlier this year for baseline imaging. No intevention required at this time  - history of seizures: grand mal x 1 triggered by strobe lights. No recurrent episodes. Never been on anti-epileptics. Has been told flashing lights and hyperventilation may be triggers.   - incidental thyroid nodule, likely of no clinical significance (TSH normal)    PSH: no prior abdominal surgery  Medications reviewed    Cycle length 28-32 days. +Luteal phase symptoms. First OPK use this month with +LH peak.  She does note her periods have always been very painful. No history of GC/CT. No history of uterine surgery. Her partner is healthy.     She discloses very negative experience she and her  endured with racism in the medical establishment while living in [place]. This is a major concern for her as she is worried for repetitive negative experiences and downstream effects with the increased volume of medical visits that are incurred with pregnancy.   She also has concern regarding how her healthcare will be implicated in the current legal climate post [place].     Review of patient's allergies indicates:  No Known Allergies    Past Medical History:   Diagnosis Date    Chiari malformation type I     Seizures 2007    Grand mal once  EEG  photot sensitivty      Past Surgical History:   Procedure Laterality Date    KNEE ARTHROSCOPY W/ DEBRIDEMENT Left     MCL tear     Widsom Teeth Extraction Bilateral      OB History          0    Para   0    Term   0       0    AB   0    Living   0         SAB   0    IAB   0    Ectopic   0    Multiple   0    Live Births   0                Family History   Problem Relation Age of Onset    Ovarian cancer Neg Hx     Colon cancer Neg Hx     Breast cancer Neg Hx      Social History     Tobacco Use    Smoking status: Never    Smokeless tobacco: Never   Substance Use Topics    Alcohol use: No    Drug use: Yes     Types: Methamphetamines       Current Outpatient Medications   Medication Sig    ALPRAZolam (XANAX) 0.25 MG tablet Take 1 tablet (0.25 mg total) by mouth daily as needed for Anxiety.     No current facility-administered medications for this visit.         Review of Systems:  Review of Systems   Constitutional:  Negative for chills and fever.   Respiratory:  Negative for cough, shortness of breath and wheezing.    Cardiovascular:  Negative for chest pain, palpitations and leg swelling.   Gastrointestinal:  Negative for abdominal pain, nausea and vomiting.   Endocrine: Negative for diabetes, hyperthyroidism and hypothyroidism.   Genitourinary:  Positive for dysmenorrhea. Negative for dysuria, pelvic pain, vaginal bleeding and vaginal pain.   Musculoskeletal:  Negative for joint swelling and myalgias.   Integumentary:  Negative for rash.   Neurological:  Negative for vertigo, seizures (seizure free for several eyars), syncope and numbness.   Hematological:  Does not bruise/bleed easily.   Psychiatric/Behavioral:  Negative for depression. The patient is not nervous/anxious.       OBJECTIVE:     Physical Exam:  Physical Exam  Constitutional:       General: She is not in acute distress.     Appearance: She is well-developed.   HENT:      Head: Normocephalic and atraumatic.   Eyes:      Extraocular Movements: Extraocular movements intact.      Conjunctiva/sclera: Conjunctivae normal.   Pulmonary:      Effort: Pulmonary effort is normal. No respiratory distress.   Musculoskeletal:         General: Normal range of motion.      Right lower leg: No edema.      Left lower leg: No edema.   Skin:     General: Skin is warm and dry.   Neurological:      Mental  Status: She is alert and oriented to person, place, and time.   Psychiatric:         Mood and Affect: Mood normal.         Behavior: Behavior normal.         ASSESSMENT:       ICD-10-CM ICD-9-CM    1. Encounter for preconception consultation  Z31.69 V26.49 Rubella Antibody, IgG      Varicella zoster antibody, IgG      Cystic Fibrosis Mutation Panel      Spinal Muscular Atrophy Carrier Screen, Dup/Del      Comprehensive Metabolic Panel      2. Thyroid nodule  E04.1 241.0 US Soft Tissue Head Neck Thyroid             Plan:      No major risk factors for infertility (noting dysmenorrhea). She is already tracking her menstrual cycle and using OPKs. I suspect she and her partner will be successful with timed intercourse.   Recommend rubella, varicella titers. She agrees.   Will look into thyroid nodule, intervention/work-up not indicated per PCP at this time. Will obtain US now that she is actively trying to conceive. Reinforced euthyroid in pregnancy (never hypo or hyper)  Discussed carrier screening including CF, SMA, expanded carrier. She desires CF, SMA carrier screening.   Message sent to [facility] provider regarding her chiari I malformation and any implications for pregnancy. OB anesthesia consult 3rd trimester  No anti-convulsant, high dose folic acid not indicated.   Implicit bias in medicine was acknowledged and patient reassured that we consistently make every effort to provide inclusive, equitable care. I am confident that our staff will provide compassionate care, however should she have negative experience I reassured her that actions would be taken and that there are patient advocacy resources  Discussed maternal care post[place]. Discussed that by law, I am able to treat medical conditions that pose risk to maternal well being including ectopic pregnancy and SAB. She has questions about second trimester fetal anomalies that we discussed are individualized and handled on case by case basis. She has residual anxiety  about this.   Answered questions about exercise in pregnancy.    F/u with +UPT. Discussed infertility diagnosis is 1 year without pregnancy, but welcomed patient to return to office in 6 months if not pregnant.     Vivienne Marshall

## 2022-10-07 ENCOUNTER — PATIENT MESSAGE (OUTPATIENT)
Dept: OBSTETRICS AND GYNECOLOGY | Facility: CLINIC | Age: 32
End: 2022-10-07
Payer: OTHER GOVERNMENT

## 2022-10-07 ENCOUNTER — PATIENT MESSAGE (OUTPATIENT)
Dept: FAMILY MEDICINE | Facility: CLINIC | Age: 32
End: 2022-10-07
Payer: OTHER GOVERNMENT

## 2022-10-07 DIAGNOSIS — R79.89 ABNORMAL LIVER FUNCTION TEST: Primary | ICD-10-CM

## 2022-10-08 LAB
ANNOTATION COMMENT IMP: NORMAL
GENETICIST REVIEW: NORMAL
MOL DX INTERP BLD/T QL: NORMAL
PROVIDER SIGNING NAME: NORMAL
SMNCS RESULT: NORMAL
SPECIMEN SOURCE: NORMAL
SPECIMEN SOURCE: NORMAL

## 2022-10-11 ENCOUNTER — LAB VISIT (OUTPATIENT)
Dept: LAB | Facility: HOSPITAL | Age: 32
End: 2022-10-11
Attending: STUDENT IN AN ORGANIZED HEALTH CARE EDUCATION/TRAINING PROGRAM
Payer: OTHER GOVERNMENT

## 2022-10-11 DIAGNOSIS — Z31.69 ENCOUNTER FOR PRECONCEPTION CONSULTATION: ICD-10-CM

## 2022-10-11 DIAGNOSIS — R79.89 ABNORMAL LIVER FUNCTION TEST: ICD-10-CM

## 2022-10-11 LAB
HAV IGM SERPL QL IA: NORMAL
HBV CORE IGM SERPL QL IA: NORMAL
HBV SURFACE AG SERPL QL IA: NORMAL
HCV AB SERPL QL IA: NORMAL
HCV AB SERPL QL IA: NORMAL

## 2022-10-11 PROCEDURE — 36415 COLL VENOUS BLD VENIPUNCTURE: CPT | Performed by: STUDENT IN AN ORGANIZED HEALTH CARE EDUCATION/TRAINING PROGRAM

## 2022-10-11 PROCEDURE — 80074 ACUTE HEPATITIS PANEL: CPT | Performed by: STUDENT IN AN ORGANIZED HEALTH CARE EDUCATION/TRAINING PROGRAM

## 2022-10-11 PROCEDURE — 81220 CFTR GENE COM VARIANTS: CPT | Performed by: STUDENT IN AN ORGANIZED HEALTH CARE EDUCATION/TRAINING PROGRAM

## 2022-10-13 ENCOUNTER — PATIENT MESSAGE (OUTPATIENT)
Dept: NEUROSURGERY | Facility: CLINIC | Age: 32
End: 2022-10-13
Payer: OTHER GOVERNMENT

## 2022-10-13 ENCOUNTER — HOSPITAL ENCOUNTER (OUTPATIENT)
Dept: RADIOLOGY | Facility: HOSPITAL | Age: 32
Discharge: HOME | End: 2022-10-13
Attending: STUDENT IN AN ORGANIZED HEALTH CARE EDUCATION/TRAINING PROGRAM
Payer: OTHER GOVERNMENT

## 2022-10-13 DIAGNOSIS — E04.1 THYROID NODULE: ICD-10-CM

## 2022-10-13 PROCEDURE — 76536 US EXAM OF HEAD AND NECK: CPT | Mod: TC

## 2022-10-13 PROCEDURE — 76536 US SOFT TISSUE HEAD NECK THYROID: ICD-10-PCS | Mod: 26,,, | Performed by: RADIOLOGY

## 2022-10-13 PROCEDURE — 76536 US EXAM OF HEAD AND NECK: CPT | Mod: 26,,, | Performed by: RADIOLOGY

## 2022-10-19 ENCOUNTER — PATIENT MESSAGE (OUTPATIENT)
Dept: OBSTETRICS AND GYNECOLOGY | Facility: CLINIC | Age: 32
End: 2022-10-19
Payer: OTHER GOVERNMENT

## 2022-10-20 ENCOUNTER — TELEPHONE (OUTPATIENT)
Dept: OBSTETRICS AND GYNECOLOGY | Facility: CLINIC | Age: 32
End: 2022-10-20
Payer: OTHER GOVERNMENT

## 2022-10-20 LAB
CFTR MUT ANL BLD/T: NEGATIVE
CFTR MUT ANL BLD/T: NORMAL
CFTR MUT TESTED BLD/T: NORMAL
GENETICIST REVIEW: NORMAL
REF LAB TEST METHOD: NORMAL

## 2022-10-24 ENCOUNTER — PATIENT MESSAGE (OUTPATIENT)
Dept: FAMILY MEDICINE | Facility: CLINIC | Age: 32
End: 2022-10-24
Payer: OTHER GOVERNMENT

## 2022-10-24 NOTE — TELEPHONE ENCOUNTER
Patient states she is currently pregnant and asking if she should get off of her xanax.     Please advise.

## 2022-10-25 ENCOUNTER — PATIENT MESSAGE (OUTPATIENT)
Dept: OBSTETRICS AND GYNECOLOGY | Facility: CLINIC | Age: 32
End: 2022-10-25
Payer: OTHER GOVERNMENT

## 2022-10-25 DIAGNOSIS — N91.2 AMENORRHEA: Primary | ICD-10-CM

## 2022-11-02 ENCOUNTER — PATIENT MESSAGE (OUTPATIENT)
Dept: OBSTETRICS AND GYNECOLOGY | Facility: CLINIC | Age: 32
End: 2022-11-02
Payer: OTHER GOVERNMENT

## 2022-11-08 ENCOUNTER — CLINICAL SUPPORT (OUTPATIENT)
Dept: FAMILY MEDICINE | Facility: CLINIC | Age: 32
End: 2022-11-08
Payer: OTHER GOVERNMENT

## 2022-11-08 ENCOUNTER — HOSPITAL ENCOUNTER (EMERGENCY)
Facility: HOSPITAL | Age: 32
Discharge: HOME | End: 2022-11-08
Attending: STUDENT IN AN ORGANIZED HEALTH CARE EDUCATION/TRAINING PROGRAM
Payer: OTHER GOVERNMENT

## 2022-11-08 ENCOUNTER — OFFICE VISIT (OUTPATIENT)
Dept: FAMILY MEDICINE | Facility: CLINIC | Age: 32
End: 2022-11-08
Payer: OTHER GOVERNMENT

## 2022-11-08 VITALS
HEIGHT: 64 IN | WEIGHT: 167 LBS | RESPIRATION RATE: 16 BRPM | DIASTOLIC BLOOD PRESSURE: 81 MMHG | HEIGHT: 64 IN | DIASTOLIC BLOOD PRESSURE: 81 MMHG | TEMPERATURE: 99 F | SYSTOLIC BLOOD PRESSURE: 142 MMHG | HEART RATE: 74 BPM | BODY MASS INDEX: 29.02 KG/M2 | SYSTOLIC BLOOD PRESSURE: 132 MMHG | RESPIRATION RATE: 18 BRPM | BODY MASS INDEX: 28.51 KG/M2 | OXYGEN SATURATION: 99 % | HEART RATE: 84 BPM | WEIGHT: 170 LBS

## 2022-11-08 DIAGNOSIS — O26.899 DYSURIA DURING PREGNANCY, ANTEPARTUM: Primary | ICD-10-CM

## 2022-11-08 DIAGNOSIS — N92.6 MISSED PERIOD: ICD-10-CM

## 2022-11-08 DIAGNOSIS — R30.0 DYSURIA DURING PREGNANCY, ANTEPARTUM: Primary | ICD-10-CM

## 2022-11-08 DIAGNOSIS — N93.9 VAGINAL BLEEDING: Primary | ICD-10-CM

## 2022-11-08 LAB
ABO + RH BLD: NORMAL
ALBUMIN SERPL BCP-MCNC: 3.7 G/DL (ref 3.5–5.2)
ALP SERPL-CCNC: 48 U/L (ref 55–135)
ALT SERPL W/O P-5'-P-CCNC: 79 U/L (ref 10–44)
ANION GAP SERPL CALC-SCNC: 10 MMOL/L (ref 8–16)
AST SERPL-CCNC: 36 U/L (ref 10–40)
B-HCG UR QL: POSITIVE
BASOPHILS # BLD AUTO: 0.04 K/UL (ref 0–0.2)
BASOPHILS NFR BLD: 0.3 % (ref 0–1.9)
BILIRUB SERPL-MCNC: <0.1 MG/DL (ref 0.1–1)
BUN SERPL-MCNC: 9 MG/DL (ref 6–20)
CALCIUM SERPL-MCNC: 9.2 MG/DL (ref 8.7–10.5)
CHLORIDE SERPL-SCNC: 107 MMOL/L (ref 95–110)
CO2 SERPL-SCNC: 20 MMOL/L (ref 23–29)
CREAT SERPL-MCNC: 0.8 MG/DL (ref 0.5–1.4)
CTP QC/QA: YES
DIFFERENTIAL METHOD: ABNORMAL
EOSINOPHIL # BLD AUTO: 0.1 K/UL (ref 0–0.5)
EOSINOPHIL NFR BLD: 0.6 % (ref 0–8)
ERYTHROCYTE [DISTWIDTH] IN BLOOD BY AUTOMATED COUNT: 12.4 % (ref 11.5–14.5)
EST. GFR  (NO RACE VARIABLE): >60 ML/MIN/1.73 M^2
GLUCOSE SERPL-MCNC: 107 MG/DL (ref 70–110)
HCG INTACT+B SERPL-ACNC: NORMAL MIU/ML
HCT VFR BLD AUTO: 35.8 % (ref 37–48.5)
HGB BLD-MCNC: 12.4 G/DL (ref 12–16)
IMM GRANULOCYTES # BLD AUTO: 0.03 K/UL (ref 0–0.04)
IMM GRANULOCYTES NFR BLD AUTO: 0.2 % (ref 0–0.5)
LYMPHOCYTES # BLD AUTO: 1.9 K/UL (ref 1–4.8)
LYMPHOCYTES NFR BLD: 13.8 % (ref 18–48)
MCH RBC QN AUTO: 32 PG (ref 27–31)
MCHC RBC AUTO-ENTMCNC: 34.6 G/DL (ref 32–36)
MCV RBC AUTO: 93 FL (ref 82–98)
MONOCYTES # BLD AUTO: 0.6 K/UL (ref 0.3–1)
MONOCYTES NFR BLD: 4.1 % (ref 4–15)
NEUTROPHILS # BLD AUTO: 11 K/UL (ref 1.8–7.7)
NEUTROPHILS NFR BLD: 81 % (ref 38–73)
NRBC BLD-RTO: 0 /100 WBC
PLATELET # BLD AUTO: 189 K/UL (ref 150–450)
PMV BLD AUTO: 11.8 FL (ref 9.2–12.9)
POTASSIUM SERPL-SCNC: 4 MMOL/L (ref 3.5–5.1)
PROT SERPL-MCNC: 6.9 G/DL (ref 6–8.4)
RBC # BLD AUTO: 3.87 M/UL (ref 4–5.4)
SODIUM SERPL-SCNC: 137 MMOL/L (ref 136–145)
WBC # BLD AUTO: 13.59 K/UL (ref 3.9–12.7)

## 2022-11-08 PROCEDURE — 99212 OFFICE O/P EST SF 10 MIN: CPT | Mod: PBBFAC,25 | Performed by: FAMILY MEDICINE

## 2022-11-08 PROCEDURE — 81025 URINE PREGNANCY TEST: CPT | Performed by: NURSE PRACTITIONER

## 2022-11-08 PROCEDURE — 36415 COLL VENOUS BLD VENIPUNCTURE: CPT | Mod: PBBFAC

## 2022-11-08 PROCEDURE — 99999 PR PBB SHADOW E&M-EST. PATIENT-LVL II: CPT | Mod: PBBFAC,,, | Performed by: FAMILY MEDICINE

## 2022-11-08 PROCEDURE — 99213 OFFICE O/P EST LOW 20 MIN: CPT | Mod: S$PBB,,, | Performed by: FAMILY MEDICINE

## 2022-11-08 PROCEDURE — 84702 CHORIONIC GONADOTROPIN TEST: CPT | Performed by: FAMILY MEDICINE

## 2022-11-08 PROCEDURE — 99284 EMERGENCY DEPT VISIT MOD MDM: CPT | Mod: 25,27

## 2022-11-08 PROCEDURE — 99999 PR PBB SHADOW E&M-EST. PATIENT-LVL II: ICD-10-PCS | Mod: PBBFAC,,, | Performed by: FAMILY MEDICINE

## 2022-11-08 PROCEDURE — 99213 PR OFFICE/OUTPT VISIT, EST, LEVL III, 20-29 MIN: ICD-10-PCS | Mod: S$PBB,,, | Performed by: FAMILY MEDICINE

## 2022-11-08 PROCEDURE — 85025 COMPLETE CBC W/AUTO DIFF WBC: CPT | Performed by: PHYSICIAN ASSISTANT

## 2022-11-08 PROCEDURE — 86901 BLOOD TYPING SEROLOGIC RH(D): CPT | Performed by: STUDENT IN AN ORGANIZED HEALTH CARE EDUCATION/TRAINING PROGRAM

## 2022-11-08 PROCEDURE — 80053 COMPREHEN METABOLIC PANEL: CPT | Performed by: PHYSICIAN ASSISTANT

## 2022-11-08 NOTE — PROGRESS NOTES
SUBJECTIVE:  76 year old  male prior patient here for treatment basal cell carcinoma nose.  Still bleeding easily.     Past Medical History   Diagnosis Date   • Actinic keratosis    • Basal cell carcinoma of scalp and skin of neck 2015     R lower neck   • BPH (benign prostatic hyperplasia)    • Dermatophytosis of nail    • Hyperlipidemia    • Hypertension    • Other malignant neoplasm of skin of upper limb, including shoulder 2011     SCC back left hand   • Pre-diabetes      hgba1c   6.2   9-2014   • Testosterone deficiency      Current Outpatient Prescriptions   Medication Sig Dispense Refill   • triamterene-hydrochlorothiazide (MAXZIDE-25) 37.5-25 MG per tablet Take 1 tablet by mouth daily. 90 tablet 1   • losartan (COZAAR) 100 MG tablet Take 1 tablet by mouth daily. 90 tablet 1   • simvastatin (ZOCOR) 10 MG tablet Take 1 tablet by mouth daily. 90 tablet 1   • testosterone 50 MG/5GM (1%) gel 5 Gm Daily 150 g 5   • econazole nitrate (SPECTAZOLE) 1 % cream 2 times daily to foot rash, and surrounding skin, until clear. 60 g 8   • sildenafil (VIAGRA) 50 MG tablet Take 1 tablet by mouth as needed for Erectile Dysfunction. 10 tablet 0   • finasteride (PROSCAR) 5 MG tablet Take 5 mg by mouth daily.     • ASPIRIN 81 MG PO TABS 1 TABLET DAILY 30 0   • naproxen (NAPROSYN) 500 MG tablet Take 1 tablet by mouth 2 times daily (with meals). As needed 60 tablet 2     No current facility-administered medications for this visit.      ALLERGIES: no known allergies.    OBJECTIVE:  Patient is oriented and in no acute distress.  Type 2/5 [place] skin type.  right upper tip nose 6x5 mm pearly pink plaque with central red friable papule    ASSESSMENT/PLAN:  Basal cell carcinoma right upper tip nose - reviewed diagnosis and treatment options with patient including biopsy, shave and destroy, and excision with types of scars that would result.  Decided to treat by shave and electrodessication and curettage.  Call patient with biopsy  gold     result.       RTC 2 months.    PROCEDURE(S):  Shave and destruction of right upper tip nose lesion(s).  Verbal consent, patient aware of scar result and risk of recurrence.  Removal of lesion(s) with 2% lidocaine with epinepherine anesthesia, deep shave entire lesion(s), destroy the base(s) with electrodesication and curettage 3 times.  Specimen(s) to lab with bandage(s) applied and wound care instructions given to patient.

## 2022-11-08 NOTE — PROGRESS NOTES
Subjective:       Patient ID: Vannesa Wilkins is a 31 y.o. female.    Chief Complaint: Urinary Tract Infection and Vaginal Itching    HPI  31 year old female comes in because of concerns in early pregnancy. She notes that she has had discharge - brown in color and 'tissue like' in nature. She has noticed vaginal irritability, dysuria and itching.   She is appx 7 wga but has not had her first ob/gyn visit.    PMH, PSH, ALLERGIES, SH, FH reviewed in nurse's notes above  Medications reconciled in the nurse's notes      Review of Systems   Constitutional:  Negative for chills and fever.   HENT:  Negative for congestion, ear pain, postnasal drip, rhinorrhea, sore throat and trouble swallowing.    Eyes:  Negative for redness and itching.   Respiratory:  Negative for cough, shortness of breath and wheezing.    Cardiovascular:  Negative for chest pain and palpitations.   Gastrointestinal:  Negative for abdominal pain, diarrhea, nausea and vomiting.   Genitourinary:  Positive for menstrual problem, vaginal bleeding and vaginal discharge. Negative for dysuria and frequency.   Skin:  Negative for rash.   Neurological:  Negative for weakness and headaches.     Objective:      Physical Exam  Vitals and nursing note reviewed.   Constitutional:       General: She is not in acute distress.     Appearance: She is well-developed.   HENT:      Head: Normocephalic and atraumatic.   Eyes:      Conjunctiva/sclera: Conjunctivae normal.      Pupils: Pupils are equal, round, and reactive to light.   Neck:      Thyroid: No thyromegaly.   Cardiovascular:      Rate and Rhythm: Normal rate and regular rhythm.      Heart sounds: Normal heart sounds.   Pulmonary:      Effort: Pulmonary effort is normal. No respiratory distress.      Breath sounds: Normal breath sounds. No wheezing.   Abdominal:      General: Bowel sounds are normal.      Palpations: Abdomen is soft.      Tenderness: There is no abdominal tenderness.   Musculoskeletal:          General: Normal range of motion.      Cervical back: Normal range of motion and neck supple.   Lymphadenopathy:      Cervical: No cervical adenopathy.   Skin:     General: Skin is warm and dry.      Findings: No rash.   Neurological:      Mental Status: She is alert and oriented to person, place, and time.   Psychiatric:         Behavior: Behavior normal.        Assessment/Plan:       Problem List Items Addressed This Visit    None  Visit Diagnoses       Dysuria during pregnancy, antepartum    -  Primary    Relevant Orders    POCT URINE DIPSTICK WITH MICROSCOPE, AUTOMATED    POCT Urine Sediment Exam    Missed period        Relevant Orders    HCG, Quantitative          RTC if condition acutely worsens or any other concerns, otherwise RTC as scheduled

## 2022-11-09 ENCOUNTER — TELEPHONE (OUTPATIENT)
Dept: OBSTETRICS AND GYNECOLOGY | Facility: CLINIC | Age: 32
End: 2022-11-09
Payer: OTHER GOVERNMENT

## 2022-11-09 ENCOUNTER — PATIENT MESSAGE (OUTPATIENT)
Dept: FAMILY MEDICINE | Facility: CLINIC | Age: 32
End: 2022-11-09
Payer: OTHER GOVERNMENT

## 2022-11-09 NOTE — TELEPHONE ENCOUNTER
Bled heavily for ~ 2 hours yesterday and was seen in ED.   Labs and imaging results discussed. Reviewed return precautions

## 2022-11-09 NOTE — FIRST PROVIDER EVALUATION
Emergency Department TeleTriage Encounter Note      CHIEF COMPLAINT    Chief Complaint   Patient presents with    Vaginal Bleeding     Presents with complaint of lower abdominal cramping with vaginal bleeding beginning a few hours ago, states 8 weeks pregnant, primigravida, seen by OB/GYN today       VITAL SIGNS   Initial Vitals [11/08/22 2041]   BP Pulse Resp Temp SpO2   (!) 144/80 89 19 98.5 °F (36.9 °C) 100 %      MAP       --            ALLERGIES    Review of patient's allergies indicates:  No Known Allergies    PROVIDER TRIAGE NOTE  This is a teletriage evaluation of a 31 y.o. female presenting to the ED with c/o vaginal bleeding in setting of 8 wk pregnancy.  Bleeding started about 4 hours ago.      ROS: (-) fever, (-) rash  PE:  NAD    Initial orders will be placed and care will be transferred to an alternate provider when patient is roomed for a full evaluation. Any additional orders and the final disposition will be determined by that provider.         ORDERS  Labs Reviewed - No data to display    ED Orders (720h ago, onward)      Start Ordered     Status Ordering Provider    11/08/22 2102 11/08/22 2101  hCG, quantitative  STAT         Ordered SD DE LA FUENTE    11/08/22 2102 11/08/22 2101  ABO/Rh  STAT         Ordered SD DE LA FUENTE    11/08/22 2102 11/08/22 2101  POCT urine pregnancy  Once         Ordered SD DE LAF UENTE    11/08/22 2102 11/08/22 2101  US OB <14 Wks TransAbd & TransVag, Single Gestation (XPD)  1 time imaging         Ordered SD DE LA FUENTE              Virtual Visit Note: The provider triage portion of this emergency department evaluation and documentation was performed via [employer], a HIPAA-compliant telemedicine application, in concert with a tele-presenter in the room. A face to face patient evaluation with one of my colleagues will occur once the patient is placed in an emergency department room.      DISCLAIMER: This note was prepared with M*Modal voice recognition  transcription software. Garbled syntax, mangled pronouns, and other bizarre constructions may be attributed to that software system.

## 2022-11-09 NOTE — DISCHARGE INSTRUCTIONS
Call your OBGYN in the morning for a follow-up   Return to the ER for any new or worsening symptoms, heavy bleeding weakness or dizziness      Thank you for coming to our Emergency Department today. It is important to remember that some problems are difficult to diagnose and may not be found during your Emergency Department visit. Be sure to follow up with your primary care doctor and review all labs/imaging/tests that were performed during this visit with them. Some labs/tests may be outside of the normal range and require non-emergent follow-up and further investigation to help diagnose/exclude/prevent complications or other medical conditions.    If you do not have a primary care doctor, you may contact the one listed on your discharge paperwork or you may also call the [facility] Appointment Desk at [phone] to schedule an appointment and establish care with one. It is important to your health that you have a primary care doctor.    Please take all medications as directed. All medications may potentially have side-effects and it is impossible to predict which medications may give you side-effects or what side-effects (if any) they will give you.. If you feel that you are having a negative effect or side-effect of any medication you should immediately stop taking them and seek medical attention. If you feel that you are having a life-threatening reaction call 911.    Return to the ER with any questions/concerns, new/concerning symptoms, worsening or failure to improve.     Do not drive, swim, climb to height, take a bath or make any important decisions for 24 hours if you have received any pain medications, sedatives or mood altering drugs during your ER visit.

## 2022-11-09 NOTE — ED PROVIDER NOTES
Encounter Date: 11/8/2022       History     Chief Complaint   Patient presents with    Vaginal Bleeding     Presents with complaint of lower abdominal cramping with vaginal bleeding beginning a few hours ago, states 8 weeks pregnant, primigravida, seen by OB/GYN today     31-year-old female presents to the for evaluation of vaginal bleeding.  Patient is approximately 8 weeks pregnant based on last menstrual cycle.  Was seen in the clinic today by her OBGYN and home a.  Beta hCG done clinic 80,000 which the patient has results of.    She reported to her OBGYN noticing some brown discharge.  Nothing concerning was reported otherwise at that time.  Patient's OBGYN told her that it could just be normal discharge.  The patient did not have any pelvic cramping or bleeding..     This evening the patient developed heavy vaginal bleeding.  She has been going through a pad approximately every 1-2 hours for the past 3-4 hours.  She reports pelvic cramping and passage of thick clots prompting her visit to the ED. vital signs are normal, she is afebrile.  She denies any pelvic pain at this time.    Review of patient's allergies indicates:  No Known Allergies  Past Medical History:   Diagnosis Date    Chiari malformation type I     Seizures 2007    Grand mal once  EEG  photot sensitivty      Past Surgical History:   Procedure Laterality Date    KNEE ARTHROSCOPY W/ DEBRIDEMENT Left 2004    MCL tear     Widsom Teeth Extraction Bilateral      Family History   Problem Relation Age of Onset    Ovarian cancer Neg Hx     Colon cancer Neg Hx     Breast cancer Neg Hx      Social History     Tobacco Use    Smoking status: Never    Smokeless tobacco: Never   Substance Use Topics    Alcohol use: No    Drug use: Yes     Types: Methamphetamines     Review of Systems   Constitutional:  Negative for fever.   HENT:  Negative for sore throat.    Respiratory:  Negative for shortness of breath.    Cardiovascular:  Negative for chest pain.    Gastrointestinal:  Negative for nausea.   Genitourinary:  Positive for vaginal bleeding. Negative for dysuria.   Musculoskeletal:  Negative for back pain.   Skin:  Negative for rash.   Neurological:  Negative for weakness.   Hematological:  Does not bruise/bleed easily.     Physical Exam     Initial Vitals [11/08/22 2041]   BP Pulse Resp Temp SpO2   (!) 144/80 89 19 98.5 °F (36.9 °C) 100 %      MAP       --         Physical Exam    Constitutional: Vital signs are normal. She appears well-developed and well-nourished.   HENT:   Head: Normocephalic and atraumatic.   Right Ear: Hearing normal.   Left Ear: Hearing normal.   Eyes: Conjunctivae are normal.   Cardiovascular:  Normal rate and regular rhythm.           Abdominal: Abdomen is soft. Bowel sounds are normal.   Soft and not tender   Genitourinary:    Genitourinary Comments: Vaginal exam deferred at this time     Musculoskeletal:         General: Normal range of motion.     Neurological: She is alert and oriented to person, place, and time.   Skin: Skin is warm and intact.   Psychiatric: She has a normal mood and affect. Her speech is normal and behavior is normal. Cognition and memory are normal.       ED Course   Procedures  Labs Reviewed   CBC W/ AUTO DIFFERENTIAL - Abnormal; Notable for the following components:       Result Value    WBC 13.59 (*)     RBC 3.87 (*)     Hematocrit 35.8 (*)     MCH 32.0 (*)     Gran # (ANC) 11.0 (*)     Gran % 81.0 (*)     Lymph % 13.8 (*)     All other components within normal limits   POCT URINE PREGNANCY - Abnormal; Notable for the following components:    POC Preg Test, Ur Positive (*)     All other components within normal limits   COMPREHENSIVE METABOLIC PANEL   GROUP & RH          Imaging Results               US OB <14 Wks TransAbd & TransVag, Single Gestation (XPD) (Final result)  Result time 11/08/22 22:46:26      Final result by Maria M Vanessa MD (11/08/22 22:46:26)                   Impression:      1. Single live  intrauterine pregnancy with estimated gestational age 7 weeks 0 days by crown-rump length and an REKHA of 06/27/2023 by ultrasound.  2. Subchorionic hemorrhage measuring 1.6 x 1.5 x 1.3 cm.  Appropriate obstetric consultation/follow-up and short-term repeat pelvic ultrasound advised.  3. Findings suggestive of complex/hemorrhagic right corpus luteum cyst measuring up to 1.6 cm.  Attention on subsequent repeat/routine follow-up obstetrical ultrasound advised.  4. Uterine fibroids the largest measuring up to 3.7 cm.  5. Small volume free pelvic fluid.  This report was flagged in Epic as abnormal.      Electronically signed by: Maria M Vanessa MD  Date:    11/08/2022  Time:    22:46               Narrative:    EXAMINATION:  US OB <14 WEEKS, TRANSABDOM & TRANSVAG, SINGLE GESTATION (XPD)    CLINICAL HISTORY:  Vag Bleeding;    TECHNIQUE:  Transabdominal sonography of the pelvis was performed, followed by transvaginal sonography to better evaluate the uterus and ovaries.    COMPARISON:  Pelvic ultrasound 10/21/2020    FINDINGS:  The uterus measures 9.2 x 5.8 x 5.6 cm. There are 2 uterine fibroids present measuring 3.7 x 3.1 x 2.9 cm and 1.7 x 1.7 x 1.5 cm.  In the uterine cavity there is a gestational sac with a mean diameter of 2.7 cm. The fetal pole measures 0.9 cm by crown rump length and corresponds to a 7 weeks 0 days gestation. Fetal heart rate is 133 BPM. The yolk sac measures 0.4 cm.  There is a subchorionic hemorrhage present measuring 1.6 x 1.5 x 1.3 cm.    The right ovary measures 2.5 x 2.3 x 2.0 cm.  There is a complex structure in the right ovary measuring 1.6 x 1.6 x 1.3 cm presumably reflecting hemorrhagic/complex corpus luteum cyst.  The left ovary measures 2.2 x 1.8 x 0.9 cm.  Arterial and venous flow is appreciated bilaterally.  There is a small volume of free pelvic fluid present.                                       Medications - No data to display  Medical Decision Making:   Initial Assessment:    31-year-old female presenting with vaginal bleeding  Differential Diagnosis:   Ectopic pregnancy, 1st trimester bleeding, miscarriage, ovarian cyst, tubo-ovarian abscess, ovarian torsion  Clinical Tests:   Lab Tests: Ordered and Reviewed  Radiological Study: Ordered and Reviewed  ED Management:  Plan:   Ultrasound shows a single live intrauterine pregnancy.  No need for RhoGam this time. Additionally there were findings of a subchorionic hemorrhage and a hemorrhagic right corpus luteum cyst.   Hemoglobin greater than 12.    Results passed along to patient.  It was advised that she call her OBGYN in the morning for a follow-up.  Recommended repeat beta hCG in 48-72 hours.  Return precautions were given.  Patient stable for discharge.                        Clinical Impression:   Final diagnoses:  [N93.9] Vaginal bleeding (Primary)      ED Disposition Condition    Discharge Stable          ED Prescriptions    None       Follow-up Information       Follow up With Specialties Details Why Contact Info    Jolie Roberson MD Family Medicine   [address] [place] [id number]  [phone]               Joshua Duong PA-C  11/08/22 2254       Joshua Duong PA-C  11/08/22 2253

## 2022-11-15 ENCOUNTER — HOSPITAL ENCOUNTER (OUTPATIENT)
Dept: PERINATAL CARE | Facility: OTHER | Age: 32
Discharge: HOME | End: 2022-11-15
Attending: STUDENT IN AN ORGANIZED HEALTH CARE EDUCATION/TRAINING PROGRAM
Payer: OTHER GOVERNMENT

## 2022-11-15 ENCOUNTER — OFFICE VISIT (OUTPATIENT)
Dept: OBSTETRICS AND GYNECOLOGY | Facility: CLINIC | Age: 32
End: 2022-11-15
Payer: OTHER GOVERNMENT

## 2022-11-15 VITALS
SYSTOLIC BLOOD PRESSURE: 130 MMHG | BODY MASS INDEX: 32.21 KG/M2 | HEIGHT: 64 IN | WEIGHT: 188.69 LBS | DIASTOLIC BLOOD PRESSURE: 70 MMHG

## 2022-11-15 DIAGNOSIS — Z32.01 POSITIVE URINE PREGNANCY TEST: ICD-10-CM

## 2022-11-15 DIAGNOSIS — N91.2 AMENORRHEA: Primary | ICD-10-CM

## 2022-11-15 DIAGNOSIS — N91.2 AMENORRHEA: ICD-10-CM

## 2022-11-15 LAB
B-HCG UR QL: POSITIVE
C TRACH DNA SPEC QL NAA+PROBE: NOT DETECTED
CTP QC/QA: YES
N GONORRHOEA DNA SPEC QL NAA+PROBE: NOT DETECTED

## 2022-11-15 PROCEDURE — 99999 PR PBB SHADOW E&M-EST. PATIENT-LVL III: CPT | Mod: PBBFAC,,,

## 2022-11-15 PROCEDURE — 81514 NFCT DS BV&VAGINITIS DNA ALG: CPT

## 2022-11-15 PROCEDURE — 76801 US OB/GYN PROCEDURE (VIEWPOINT): ICD-10-PCS | Mod: 26,,, | Performed by: OBSTETRICS & GYNECOLOGY

## 2022-11-15 PROCEDURE — 76801 OB US < 14 WKS SINGLE FETUS: CPT

## 2022-11-15 PROCEDURE — 99999 PR PBB SHADOW E&M-EST. PATIENT-LVL III: ICD-10-PCS | Mod: PBBFAC,,,

## 2022-11-15 PROCEDURE — 87591 N.GONORRHOEAE DNA AMP PROB: CPT

## 2022-11-15 PROCEDURE — 81025 URINE PREGNANCY TEST: CPT | Mod: PBBFAC

## 2022-11-15 PROCEDURE — 99213 OFFICE O/P EST LOW 20 MIN: CPT | Mod: PBBFAC

## 2022-11-15 PROCEDURE — 99213 PR OFFICE/OUTPT VISIT, EST, LEVL III, 20-29 MIN: ICD-10-PCS | Mod: S$PBB,,,

## 2022-11-15 PROCEDURE — 99213 OFFICE O/P EST LOW 20 MIN: CPT | Mod: S$PBB,,,

## 2022-11-15 PROCEDURE — 76801 OB US < 14 WKS SINGLE FETUS: CPT | Mod: 26,,, | Performed by: OBSTETRICS & GYNECOLOGY

## 2022-11-15 PROCEDURE — 87086 URINE CULTURE/COLONY COUNT: CPT

## 2022-11-15 PROCEDURE — 87491 CHLMYD TRACH DNA AMP PROBE: CPT

## 2022-11-15 NOTE — PATIENT INSTRUCTIONS
1) Eat small frequent meals through the day versus three large meals  2) Try ginger ale or sprite to help settle the stomach   3) Eat crackers or dry toast before getting out of bed in the morning   4) Stay hydrated by drinking plenty of water-do not immediately eat or drink something after vomiting. Give your stomach a rest for 20-30 minutes. Slowly reintroduce ice chips, small sips water, crackers, etc.    5) Try OTC Unisom (use the tablets that have doxylamine not diphenhydramine in them, can use generic brands like Equate) and vitamin B6  (25 mg, can find on [employer]) together at night before bed. This can help with the nausea in the morning and is safe to use during pregnancy. You can also take the vitamin B6 alone, every 8 hours to help with the nausea.     If you are unable to keep anything down and constantly vomiting for more that 24 hours, let the office know so that dehydration can be avoided. We would recommend being seen in the emergency department if this is the case.      Call [phone] to see about coverage and any out of pocket costs regarding the [id number] (MT21) testing. This is done any day after 11 weeks and is blood work only. It checks for any chromosomal abnormalities like Down Syndrome. You can also find out the sex of the baby if you choose to know. Once you find out coverage and decide to proceed, send either myself or your doctor a message and we can see what date you can do it. It is done at our second floor lab at [facility].      The sequential screen is a test done around 12-14 weeks that consists of an ultrasound that measures the nuchal fold (neck thickness) of baby and blood work on the same day. You get a second set of labs done a few weeks later after 15 weeks. Based on all three of these results, they are able to tell you if you are considered to be low risk or high risk regarding neural tube defects and chromosomal abnormalities like Down Syndrome. If you are at all interested,  I usually place the order today so we do not miss the window. Someone will give you a phone call in a week or two to schedule this. If you do not want it, just tell them no thank you. This test is typically covered by your insurance and is performed by the Maternal Fetal Medicine (MFM) department here at [facility]. It will not tell you the sex of the baby.     Call clinic [phone] or L & D after hours at [phone]

## 2022-11-15 NOTE — PROGRESS NOTES
CC: Positive Pregnancy Test    HISTORY OF PRESENT ILLNESS:    Vannesa Wilkins is a 31 y.o. female, ,  Presents today for a routine exam complaining of amenorrhea and positive home urine pregnancy test.  Patient's last menstrual period was 2022 (exact date).  Pt reports menses were normal occuring once monthly prior to this. She is not currently on any contraception. Reports nausea. Reports breast tenderness. Denies pelvic pain. Did go to the ER on  for vaginal bleeding and cramping. See ultrasound results below. Pt reports that the bleeding has stopped but she does notice some abnormal discharge.     EXAMINATION:  US OB <14 WEEKS, TRANSABDOM & TRANSVAG, SINGLE GESTATION (XPD)     CLINICAL HISTORY:  Vag Bleeding;     TECHNIQUE:  Transabdominal sonography of the pelvis was performed, followed by transvaginal sonography to better evaluate the uterus and ovaries.     COMPARISON:  Pelvic ultrasound 10/21/2020     FINDINGS:  The uterus measures 9.2 x 5.8 x 5.6 cm. There are 2 uterine fibroids present measuring 3.7 x 3.1 x 2.9 cm and 1.7 x 1.7 x 1.5 cm.  In the uterine cavity there is a gestational sac with a mean diameter of 2.7 cm. The fetal pole measures 0.9 cm by crown rump length and corresponds to a 7 weeks 0 days gestation. Fetal heart rate is 133 BPM. The yolk sac measures 0.4 cm.  There is a subchorionic hemorrhage present measuring 1.6 x 1.5 x 1.3 cm.     The right ovary measures 2.5 x 2.3 x 2.0 cm.  There is a complex structure in the right ovary measuring 1.6 x 1.6 x 1.3 cm presumably reflecting hemorrhagic/complex corpus luteum cyst.  The left ovary measures 2.2 x 1.8 x 0.9 cm.  Arterial and venous flow is appreciated bilaterally.  There is a small volume of free pelvic fluid present.    LMP: 22  EGA: 8w2d (per LMP)  EDC: 23 (per LMP)    ROS:  GENERAL: No weight changes. No swelling. No fatigue. No fever.  CARDIOVASCULAR: No chest pain. No shortness of breath. No leg cramps.  "  NEUROLOGICAL: No headaches. No vision changes.  BREASTS: No pain. No lumps. No discharge.  ABDOMEN: No pain. No diarrhea. No constipation.  REPRODUCTIVE: No abnormal bleeding.   VULVA: No pain. No lesions. No itching.  VAGINA: No relaxation. No itching. No odor. No lesions.  URINARY: No incontinence. No nocturia. No frequency. No dysuria.    MEDICATIONS AND ALLERGIES:  Reviewed    COMPREHENSIVE GYN HISTORY:  PAP History: Denies abnormal Paps. Pap 9/2022 NILM, HPV neg   Infection History: Denies STDs. Denies PID.  Benign History: Reports uterine fibroids. Reports ovarian cysts. Denies endometriosis. Denies other conditions.  Cancer History: Denies cervical cancer. Denies uterine cancer or hyperplasia. Denies ovarian cancer. Denies vulvar cancer or pre-cancer. Denies vaginal cancer or pre-cancer. Denies breast cancer. Denies colon cancer.  Sexual Activity History: Reports currently being sexually active  Menstrual History: None.  Contraception: None    /70   Ht 5' 4" (1.626 m)   Wt 85.6 kg (188 lb 11.4 oz)   LMP 09/18/2022 (Exact Date)   BMI 32.39 kg/m²     PE:  AFFECT: Calm, alert and oriented X 3. Interactive during exam  GENERAL: Appears well-nourished, well-developed, in no acute distress.  HEAD: Normocephalic, atruamatic  SKIN: Normal for race, warm, & dry. No lesions or rashes.  LUNGS: Easy and unlabored  HEART: Regular rate   ABDOMEN: Soft and nontender without masses or organomegally.  VULVA: No lesions, masses or tenderness.  VAGINA: Moist and well rugated without lesions or discharge.  CERVIX: Moist and pink without lesions, discharge or tenderness. No bleeding noted.     UTERUS: non tender and without masses.  EXTREMITIES: No cyanosis, clubbing or edema. No calf tenderness.  LYMPH NODES: No axillary or inguinal adenopathy.    PROCEDURES:  UPT Positive  Genprobe  Pap up to date     ASSESSMENT/PLAN:  Amenorrhea  Positive urine pregnancy test (REKHA: 6/25/23, EGA: 8w2d based on LMP)    -  Routine " prenatal care    Nausea and vomiting in pregnancy    -  Education regarding lifestyle and dietary modifications    -  Advised use of B6/Unisom. Pt will notify us if no relief/worsening symptoms    1st TRIMESTER COUNSELING: Discussed all, booklet provided:  Common complaints of pregnancy  HIV and other routine prenatal tests including  genetic screening  Risk factors identified by prenatal history  Oriented to practice - discussed anticipated course of prenatal care & indications for Ultrasound  Childbirth classes/Hospital facilities   Nutrition and weight gain counseling  Toxoplasmosis precautions (Cats/Raw Meat)  Sexual activity and exercise  Environmental/Work hazards  Travel  Tobacco (Ask, Advise, Assess, Assist, and Arrange), as well as alcohol and drug use  Use of any medications (Including supplements, Vitamins, Herbs, or OTC Drugs)  Domestic violence  Seat belt use    TERATOLOGY COUNSELING:   Discussed indications and options for aneuploidy screening - pamphlets given    -  Pt will call insurance about testing     PLAN:  - UPT pos  - Dating ultrasound today   - GC and AFFIRM collected   - 1T labs ordered   - Pap up to date   - Urine culture collected     FOLLOW-UP in 4 weeks with Dr. Archie Jones NP    OB/GYN

## 2022-11-16 ENCOUNTER — PATIENT MESSAGE (OUTPATIENT)
Dept: OBSTETRICS AND GYNECOLOGY | Facility: CLINIC | Age: 32
End: 2022-11-16
Payer: OTHER GOVERNMENT

## 2022-11-16 LAB — BACTERIA UR CULT: NO GROWTH

## 2022-11-17 LAB
BACTERIAL VAGINOSIS DNA: NEGATIVE
CANDIDA GLABRATA DNA: NEGATIVE
CANDIDA KRUSEI DNA: NEGATIVE
CANDIDA RRNA VAG QL PROBE: NEGATIVE
T VAGINALIS RRNA GENITAL QL PROBE: NEGATIVE

## 2022-11-28 ENCOUNTER — PATIENT MESSAGE (OUTPATIENT)
Dept: OBSTETRICS AND GYNECOLOGY | Facility: CLINIC | Age: 32
End: 2022-11-28
Payer: OTHER GOVERNMENT

## 2022-12-13 ENCOUNTER — INITIAL PRENATAL (OUTPATIENT)
Dept: OBSTETRICS AND GYNECOLOGY | Facility: CLINIC | Age: 32
End: 2022-12-13
Payer: OTHER GOVERNMENT

## 2022-12-13 ENCOUNTER — PATIENT MESSAGE (OUTPATIENT)
Dept: ADMINISTRATIVE | Facility: OTHER | Age: 32
End: 2022-12-13
Payer: OTHER GOVERNMENT

## 2022-12-13 VITALS
BODY MASS INDEX: 33.07 KG/M2 | SYSTOLIC BLOOD PRESSURE: 125 MMHG | WEIGHT: 192.69 LBS | DIASTOLIC BLOOD PRESSURE: 87 MMHG

## 2022-12-13 DIAGNOSIS — Z3A.12 12 WEEKS GESTATION OF PREGNANCY: Primary | ICD-10-CM

## 2022-12-13 DIAGNOSIS — G93.5 CHIARI MALFORMATION TYPE I: ICD-10-CM

## 2022-12-13 PROCEDURE — 99999 PR PBB SHADOW E&M-EST. PATIENT-LVL II: ICD-10-PCS | Mod: PBBFAC,,, | Performed by: STUDENT IN AN ORGANIZED HEALTH CARE EDUCATION/TRAINING PROGRAM

## 2022-12-13 PROCEDURE — 0500F PR INITIAL PRENATAL CARE VISIT: ICD-10-PCS | Mod: ,,, | Performed by: STUDENT IN AN ORGANIZED HEALTH CARE EDUCATION/TRAINING PROGRAM

## 2022-12-13 PROCEDURE — 99212 OFFICE O/P EST SF 10 MIN: CPT | Mod: PBBFAC | Performed by: STUDENT IN AN ORGANIZED HEALTH CARE EDUCATION/TRAINING PROGRAM

## 2022-12-13 PROCEDURE — 99999 PR PBB SHADOW E&M-EST. PATIENT-LVL II: CPT | Mod: PBBFAC,,, | Performed by: STUDENT IN AN ORGANIZED HEALTH CARE EDUCATION/TRAINING PROGRAM

## 2022-12-13 PROCEDURE — 0500F INITIAL PRENATAL CARE VISIT: CPT | Mod: ,,, | Performed by: STUDENT IN AN ORGANIZED HEALTH CARE EDUCATION/TRAINING PROGRAM

## 2022-12-13 RX ORDER — ONDANSETRON 4 MG/1
TABLET, ORALLY DISINTEGRATING ORAL
Qty: 30 TABLET | Refills: 1 | Status: SHIPPED | OUTPATIENT
Start: 2022-12-13 | End: 2023-03-15

## 2022-12-14 ENCOUNTER — PATIENT MESSAGE (OUTPATIENT)
Dept: OBSTETRICS AND GYNECOLOGY | Facility: CLINIC | Age: 32
End: 2022-12-14
Payer: OTHER GOVERNMENT

## 2022-12-14 PROBLEM — N91.2 AMENORRHEA: Status: RESOLVED | Noted: 2022-11-15 | Resolved: 2022-12-14

## 2022-12-14 PROBLEM — G93.5 CHIARI MALFORMATION TYPE I: Status: ACTIVE | Noted: 2022-12-14

## 2022-12-15 NOTE — PROGRESS NOTES
Here for initial OB. Fortunately has not had bleeding since episode around ED visit. Has resumed exercise but taking it easier. Reasonably anxious but feeling well. Still with some n/v. Planning to travel to [place] around holidays.     /87   Wt 87.4 kg (192 lb 10.9 oz)   LMP 2022 (Exact Date)   BMI 33.07 kg/m²     32 y.o., at 12w0d by Estimated Date of Delivery: 23  Patient Active Problem List   Diagnosis    Lumbar back pain with radiculopathy affecting lower extremity    Amenorrhea     OB History    Para Term  AB Living   1 0 0 0 0 0   SAB IAB Ectopic Multiple Live Births   0 0 0 0 0      # Outcome Date GA Lbr Junior/2nd Weight Sex Delivery Anes PTL Lv   1 Current                Dating reviewed    Allergies and problem list reviewed and updated    Medical and surgical history reviewed    Prenatal labs reviewed and updated    Physical Exam:  ABD: soft, gravid, nontender, +FHT    Assessment:  OB 12w0d    Plan:   - connected mom discussed and ordered  - zofran prn   - re-addressed aneuploidy screen; desires NT ultrasound with sequential/integrated labs  - travel/immblity precautions    F/u 4 weeks. Bleeding 1T/early 2T precautions.     Vivienne Almanza MD  Obstetrics & Gynecology   [facility]

## 2022-12-19 ENCOUNTER — PATIENT MESSAGE (OUTPATIENT)
Dept: MATERNAL FETAL MEDICINE | Facility: CLINIC | Age: 32
End: 2022-12-19
Payer: OTHER GOVERNMENT

## 2022-12-20 ENCOUNTER — PATIENT MESSAGE (OUTPATIENT)
Dept: OBSTETRICS AND GYNECOLOGY | Facility: CLINIC | Age: 32
End: 2022-12-20
Payer: OTHER GOVERNMENT

## 2022-12-20 ENCOUNTER — PROCEDURE VISIT (OUTPATIENT)
Dept: MATERNAL FETAL MEDICINE | Facility: CLINIC | Age: 32
End: 2022-12-20
Payer: OTHER GOVERNMENT

## 2022-12-20 ENCOUNTER — LAB VISIT (OUTPATIENT)
Dept: LAB | Facility: OTHER | Age: 32
End: 2022-12-20
Attending: STUDENT IN AN ORGANIZED HEALTH CARE EDUCATION/TRAINING PROGRAM
Payer: OTHER GOVERNMENT

## 2022-12-20 ENCOUNTER — PATIENT MESSAGE (OUTPATIENT)
Dept: ADMINISTRATIVE | Facility: OTHER | Age: 32
End: 2022-12-20
Payer: OTHER GOVERNMENT

## 2022-12-20 VITALS — BODY MASS INDEX: 33.19 KG/M2 | WEIGHT: 193.31 LBS

## 2022-12-20 DIAGNOSIS — Z36.82 ENCOUNTER FOR ANTENATAL SCREENING FOR NUCHAL TRANSLUCENCY: ICD-10-CM

## 2022-12-20 DIAGNOSIS — Z3A.15 15 WEEKS GESTATION OF PREGNANCY: Primary | ICD-10-CM

## 2022-12-20 DIAGNOSIS — Z3A.12 12 WEEKS GESTATION OF PREGNANCY: ICD-10-CM

## 2022-12-20 DIAGNOSIS — Z36.89 ENCOUNTER FOR FETAL ANATOMIC SURVEY: ICD-10-CM

## 2022-12-20 DIAGNOSIS — Z36.82 ENCOUNTER FOR ANTENATAL SCREENING FOR NUCHAL TRANSLUCENCY: Primary | ICD-10-CM

## 2022-12-20 PROCEDURE — 81508 FTL CGEN ABNOR TWO PROTEINS: CPT | Performed by: STUDENT IN AN ORGANIZED HEALTH CARE EDUCATION/TRAINING PROGRAM

## 2022-12-20 PROCEDURE — 36415 COLL VENOUS BLD VENIPUNCTURE: CPT | Performed by: STUDENT IN AN ORGANIZED HEALTH CARE EDUCATION/TRAINING PROGRAM

## 2022-12-20 PROCEDURE — 76813 US MFM PROCEDURE (VIEWPOINT): ICD-10-PCS | Mod: 26,S$PBB,, | Performed by: OBSTETRICS & GYNECOLOGY

## 2022-12-20 PROCEDURE — 76813 OB US NUCHAL MEAS 1 GEST: CPT | Mod: PBBFAC | Performed by: OBSTETRICS & GYNECOLOGY

## 2022-12-21 ENCOUNTER — PATIENT MESSAGE (OUTPATIENT)
Dept: OBSTETRICS AND GYNECOLOGY | Facility: CLINIC | Age: 32
End: 2022-12-21
Payer: OTHER GOVERNMENT

## 2022-12-22 ENCOUNTER — IMMUNIZATION (OUTPATIENT)
Dept: OBSTETRICS AND GYNECOLOGY | Facility: CLINIC | Age: 32
End: 2022-12-22
Payer: OTHER GOVERNMENT

## 2022-12-22 DIAGNOSIS — Z23 NEED FOR VACCINATION: Primary | ICD-10-CM

## 2022-12-22 PROCEDURE — 91312 COVID-19, MRNA, LNP-S, BIVALENT BOOSTER, PF, 30 MCG/0.3 ML DOSE: CPT | Mod: PBBFAC

## 2022-12-22 PROCEDURE — 0124A COVID-19, MRNA, LNP-S, BIVALENT BOOSTER, PF, 30 MCG/0.3 ML DOSE: CPT | Mod: PBBFAC

## 2022-12-23 LAB
# FETUSES US: NORMAL
AGE AT DELIVERY: 31
B-HCG MOM SERPL: NORMAL
B-HCG SERPL-ACNC: 37.9 IU/ML
FET CRL US.MEAS: 70.3 MM
FET NASAL BONE LENGTH US.MEAS: NORMAL MM
FET NUCHAL FOLD MOM THICKNESS US.MEAS: NORMAL
FET NUCHAL FOLD THICKNESS US.MEAS: 1.5 MM
FET TS 21 RISK FROM MAT AGE: NORMAL
GA (DAYS): 2 D
GA (WEEKS): 13 WK
IDDM PATIENT QL: NORMAL
INTEGRATED SCN PATIENT-IMP NAR: NORMAL
INTEGRATED SCN PATIENT-IMP: NEGATIVE
PAPP-A MOM SERPL: NORMAL
PAPP-A SERPL-MCNC: 631.1 NG/ML
SMOKING STATUS FTND: NO
TS 18 RISK FETUS: NORMAL
TS 21 RISK FETUS: NORMAL
US DATE: NORMAL

## 2022-12-27 ENCOUNTER — PATIENT MESSAGE (OUTPATIENT)
Dept: ADMINISTRATIVE | Facility: OTHER | Age: 32
End: 2022-12-27
Payer: OTHER GOVERNMENT

## 2022-12-27 ENCOUNTER — PATIENT MESSAGE (OUTPATIENT)
Dept: OBSTETRICS AND GYNECOLOGY | Facility: CLINIC | Age: 32
End: 2022-12-27
Payer: OTHER GOVERNMENT

## 2022-12-28 ENCOUNTER — TELEPHONE (OUTPATIENT)
Dept: OBSTETRICS AND GYNECOLOGY | Facility: CLINIC | Age: 32
End: 2022-12-28
Payer: OTHER GOVERNMENT

## 2023-01-04 ENCOUNTER — LAB VISIT (OUTPATIENT)
Dept: LAB | Facility: OTHER | Age: 33
End: 2023-01-04
Attending: STUDENT IN AN ORGANIZED HEALTH CARE EDUCATION/TRAINING PROGRAM
Payer: OTHER GOVERNMENT

## 2023-01-04 DIAGNOSIS — Z3A.15 15 WEEKS GESTATION OF PREGNANCY: ICD-10-CM

## 2023-01-04 PROCEDURE — 81511 FTL CGEN ABNOR FOUR ANAL: CPT | Performed by: STUDENT IN AN ORGANIZED HEALTH CARE EDUCATION/TRAINING PROGRAM

## 2023-01-04 PROCEDURE — 36415 COLL VENOUS BLD VENIPUNCTURE: CPT | Performed by: STUDENT IN AN ORGANIZED HEALTH CARE EDUCATION/TRAINING PROGRAM

## 2023-01-06 ENCOUNTER — TELEPHONE (OUTPATIENT)
Dept: OBSTETRICS AND GYNECOLOGY | Facility: CLINIC | Age: 33
End: 2023-01-06
Payer: OTHER GOVERNMENT

## 2023-01-06 NOTE — TELEPHONE ENCOUNTER
Reached out to pt in regards to r/s appt due to provider being out to of the office. Pt did not answer, left vm for pt to give the office a call back

## 2023-01-06 NOTE — TELEPHONE ENCOUNTER
Returned pts call. Pt needed Ob visit r/s. Vu and appt r/s     ----- Message from Summer Rosa sent at 1/6/2023 11:53 AM CST -----  Regarding: returning call    Type:  Patient Returning Call    Who Called:JUANITO LANCASTER [[id number]]    Who Left Message for Patient:Jada     Does the patient know what this is regarding? Yes r/s  her appt     Best Call Back Number:[phone]    Additional Information: Yes please respond through the portal she is on vacay. She is returning on 1/17, so anytime after that is fine.:)

## 2023-01-10 ENCOUNTER — PATIENT MESSAGE (OUTPATIENT)
Dept: OTHER | Facility: OTHER | Age: 33
End: 2023-01-10
Payer: OTHER GOVERNMENT

## 2023-01-10 LAB
# FETUSES US: NORMAL
AFP MOM SERPL: 1.49
AFP SERPL-MCNC: 40.2 NG/ML
AGE AT DELIVERY: 31
B-HCG MOM SERPL: 0.7
B-HCG SERPL-ACNC: 28.7 IU/ML
COLLECT DATE BLD: NORMAL
COLLECT DATE: NORMAL
FET NASAL BONE LENGTH US.MEAS: NORMAL MM
FET NUCHAL FOLD MOM THICKNESS US.MEAS: 0.85
FET NUCHAL FOLD THICKNESS US.MEAS: 1.5 MM
FET TS 21 RISK FROM MAT AGE: NORMAL
GA (DAYS): 2 D
GA (WEEKS): 13 WK
GA METHOD: NORMAL
GEST. AGE (DAYS) 2ND SAMPLE (SS2): 3
GEST. AGE (WKS) 2ND SAMPLE (SS2): 15
IDDM PATIENT QL: NORMAL
INHIBIN A MOM SERPL: 1.27
INHIBIN A SERPL-MCNC: 173.4 PG/ML
INTEGRATED SCN PATIENT-IMP: NEGATIVE
PAPP-A MOM SERPL: 0.68
PAPP-A SERPL-MCNC: 631.1 NG/ML
SEQUENTIAL SCREEN PART 2 INTERP: NORMAL
SMOKING STATUS FTND: NO
TS 18 RISK FETUS: NORMAL
TS 21 RISK FETUS: NORMAL
U ESTRIOL MOM SERPL: 0.77
U ESTRIOL SERPL-MCNC: 0.54 NG/ML

## 2023-01-17 ENCOUNTER — PATIENT MESSAGE (OUTPATIENT)
Dept: OTHER | Facility: OTHER | Age: 33
End: 2023-01-17
Payer: OTHER GOVERNMENT

## 2023-01-18 ENCOUNTER — ROUTINE PRENATAL (OUTPATIENT)
Dept: OBSTETRICS AND GYNECOLOGY | Facility: CLINIC | Age: 33
End: 2023-01-18
Payer: OTHER GOVERNMENT

## 2023-01-18 VITALS
WEIGHT: 200.19 LBS | BODY MASS INDEX: 34.36 KG/M2 | DIASTOLIC BLOOD PRESSURE: 82 MMHG | SYSTOLIC BLOOD PRESSURE: 122 MMHG

## 2023-01-18 DIAGNOSIS — O26.892 VAGINAL DISCHARGE DURING PREGNANCY IN SECOND TRIMESTER: Primary | ICD-10-CM

## 2023-01-18 DIAGNOSIS — Z3A.17 17 WEEKS GESTATION OF PREGNANCY: ICD-10-CM

## 2023-01-18 DIAGNOSIS — N89.8 VAGINAL DISCHARGE DURING PREGNANCY IN SECOND TRIMESTER: Primary | ICD-10-CM

## 2023-01-18 PROCEDURE — 99212 OFFICE O/P EST SF 10 MIN: CPT | Mod: PBBFAC | Performed by: STUDENT IN AN ORGANIZED HEALTH CARE EDUCATION/TRAINING PROGRAM

## 2023-01-18 PROCEDURE — 81514 NFCT DS BV&VAGINITIS DNA ALG: CPT | Performed by: STUDENT IN AN ORGANIZED HEALTH CARE EDUCATION/TRAINING PROGRAM

## 2023-01-18 PROCEDURE — 0502F SUBSEQUENT PRENATAL CARE: CPT | Mod: ,,, | Performed by: STUDENT IN AN ORGANIZED HEALTH CARE EDUCATION/TRAINING PROGRAM

## 2023-01-18 PROCEDURE — 99999 PR PBB SHADOW E&M-EST. PATIENT-LVL II: ICD-10-PCS | Mod: PBBFAC,,, | Performed by: STUDENT IN AN ORGANIZED HEALTH CARE EDUCATION/TRAINING PROGRAM

## 2023-01-18 PROCEDURE — 0502F PR SUBSEQUENT PRENATAL CARE: ICD-10-PCS | Mod: ,,, | Performed by: STUDENT IN AN ORGANIZED HEALTH CARE EDUCATION/TRAINING PROGRAM

## 2023-01-18 PROCEDURE — 99999 PR PBB SHADOW E&M-EST. PATIENT-LVL II: CPT | Mod: PBBFAC,,, | Performed by: STUDENT IN AN ORGANIZED HEALTH CARE EDUCATION/TRAINING PROGRAM

## 2023-01-18 NOTE — PROGRESS NOTES
Overall doing well. Just got back from [place]. Noticed symmetric BLE after flight that was significant but resolved now.   Annoying pressure/frontal HA, resolve spotnaneously or with tylenol. Hx migraines which are much worse- not currently having.   No VB but noticing increased sensation of fluid vs discharge PV. No continuous leaking or big gush. No ctx. ?FM    /82   Wt 90.8 kg (200 lb 2.8 oz)   LMP 2022 (Exact Date)   BMI 34.36 kg/m²     32 y.o., at 17w1d by Estimated Date of Delivery: 23  Patient Active Problem List   Diagnosis    Lumbar back pain with radiculopathy affecting lower extremity    Chiari malformation type I     OB History    Para Term  AB Living   1 0 0 0 0 0   SAB IAB Ectopic Multiple Live Births   0 0 0 0 0      # Outcome Date GA Lbr Junior/2nd Weight Sex Delivery Anes PTL Lv   1 Current                Dating reviewed    Allergies and problem list reviewed and updated    Medical and surgical history reviewed    Prenatal labs reviewed and updated    Physical Exam:  ABD: soft, gravid, nontender, exam reassuring against ROM (no pooling or valsalva, nitrazine neg), small physio discharge present    Assessment:  OB 17w1d  Vaginal discharge    Plan:   - affirm sent  - OB ED precautions, specifically PPROM ROS reviewed  - L&D number given  - anatomy US coming up    F/u 4 weeks     Vivienne Almanza MD  Obstetrics & Gynecology   [facility]

## 2023-01-25 ENCOUNTER — PATIENT MESSAGE (OUTPATIENT)
Dept: OBSTETRICS AND GYNECOLOGY | Facility: CLINIC | Age: 33
End: 2023-01-25
Payer: OTHER GOVERNMENT

## 2023-01-30 ENCOUNTER — PATIENT MESSAGE (OUTPATIENT)
Dept: MATERNAL FETAL MEDICINE | Facility: CLINIC | Age: 33
End: 2023-01-30
Payer: OTHER GOVERNMENT

## 2023-01-31 ENCOUNTER — RESEARCH ENCOUNTER (OUTPATIENT)
Dept: RESEARCH | Facility: OTHER | Age: 33
End: 2023-01-31
Payer: OTHER GOVERNMENT

## 2023-01-31 ENCOUNTER — PROCEDURE VISIT (OUTPATIENT)
Dept: MATERNAL FETAL MEDICINE | Facility: CLINIC | Age: 33
End: 2023-01-31
Payer: OTHER GOVERNMENT

## 2023-01-31 DIAGNOSIS — Z36.2 ENCOUNTER FOR FOLLOW-UP ULTRASOUND OF FETAL ANATOMY: Primary | ICD-10-CM

## 2023-01-31 DIAGNOSIS — Z36.89 ENCOUNTER FOR FETAL ANATOMIC SURVEY: ICD-10-CM

## 2023-01-31 PROCEDURE — 76805 OB US >/= 14 WKS SNGL FETUS: CPT | Mod: PBBFAC | Performed by: OBSTETRICS & GYNECOLOGY

## 2023-01-31 PROCEDURE — 76805 US MFM PROCEDURE (VIEWPOINT): ICD-10-PCS | Mod: 26,S$PBB,, | Performed by: OBSTETRICS & GYNECOLOGY

## 2023-01-31 NOTE — RESEARCH
"  Screening Visit  Sponsor: [employer] "Miracle of Life Study"  Study: Observational Study of Pregnant Women to Validate Biomarkers of Pregnancy Complication Risk  IRB/Protocol #: 2021.103/ [id number]  Principle Investigator/ Sub-Investigator: Audi Kirk MD  Site: [facility]  Location: [facility]  Subject Number: [id number]     Subject presented for enrollment in [employer]/ [employer]caacle of Life Study.  Subject is  19 weeks 0 days pregnant.  Subject meets all eligibility criteria for enrollment in study.     Prior to the Informed Consent (IC) being signed, or any study protocol required data collection, testing, procedure, or intervention being performed, the following was done and/or discussed:?   Patient was given a copy of the IC for review??   Purpose of the study and qualifications to participate??   Study design, follow up schedule  Confidentiality and HIPAA Authorization for Release of Medical Records for the research trial/ subject's rights/research related injury?   Risk, Benefits, Compensation and Costs?   Participation in the research trial is voluntary and patient may withdraw at anytime?   Contact information for study related questions?      Patient verbalizes understanding of the above: Yes?   Contact information for CRC and PI given to patient: Yes?   Patient able to adequately summarize: the purpose of the study, the risks associated with the study, and all procedures, and follow-ups associated with the study: Yes?      Informed Consent:   Consenting was completed in private area using the most current IRB approved version of the ICF (Revised 10 Sep 2021) and patient was given ample time to review consent prior to execution of ICF.  Before signing consent, patient was asked if she had any questions and she stated "no".   Vannesa Wilkins signed the IRB approved version of informed consent form for the [employer]/ Miracle of Life research study.? Each page of the consent was reviewed with the patient and all " questions answered satisfactorily. The patient signed the paper consent form and received a copy of same (copy of informed consent made and provided to patient). The original consent was scanned into electronic medical records (EPIC).    Time of Consent Execution: 11:23 am      Blood Sample Collection:      Was the sample collected?: Yes  Sample collected by: nghia Rayo  Date of collection: 21/jan/2023  Time of collection: 11:33 am  Specimen Type: whole blood  Specimen shipped Fed EX Ref # [id number]  Shipped on 31 jan 2023     Sample ID #: [id number]     Patient advised we would continue to follow her throughout her pregnancy and would follow up with her after her delivery, Patient verbalized understanding.     Patient received [employer] Card Token # [id number].  Research participant received $25 stipend.

## 2023-02-07 ENCOUNTER — PATIENT MESSAGE (OUTPATIENT)
Dept: OTHER | Facility: OTHER | Age: 33
End: 2023-02-07
Payer: OTHER GOVERNMENT

## 2023-02-17 ENCOUNTER — LAB VISIT (OUTPATIENT)
Dept: LAB | Facility: OTHER | Age: 33
End: 2023-02-17
Attending: STUDENT IN AN ORGANIZED HEALTH CARE EDUCATION/TRAINING PROGRAM
Payer: OTHER GOVERNMENT

## 2023-02-17 ENCOUNTER — ROUTINE PRENATAL (OUTPATIENT)
Dept: OBSTETRICS AND GYNECOLOGY | Facility: CLINIC | Age: 33
End: 2023-02-17
Payer: OTHER GOVERNMENT

## 2023-02-17 ENCOUNTER — TELEPHONE (OUTPATIENT)
Dept: OBSTETRICS AND GYNECOLOGY | Facility: CLINIC | Age: 33
End: 2023-02-17

## 2023-02-17 VITALS
WEIGHT: 210.56 LBS | BODY MASS INDEX: 36.14 KG/M2 | SYSTOLIC BLOOD PRESSURE: 144 MMHG | DIASTOLIC BLOOD PRESSURE: 90 MMHG

## 2023-02-17 DIAGNOSIS — Z3A.21 21 WEEKS GESTATION OF PREGNANCY: ICD-10-CM

## 2023-02-17 DIAGNOSIS — O16.2 ELEVATED BLOOD PRESSURE AFFECTING PREGNANCY IN SECOND TRIMESTER, ANTEPARTUM: Primary | ICD-10-CM

## 2023-02-17 DIAGNOSIS — O16.2 ELEVATED BLOOD PRESSURE AFFECTING PREGNANCY IN SECOND TRIMESTER, ANTEPARTUM: ICD-10-CM

## 2023-02-17 DIAGNOSIS — R74.8 ELEVATED LIVER ENZYMES: ICD-10-CM

## 2023-02-17 DIAGNOSIS — Z3A.21 21 WEEKS GESTATION OF PREGNANCY: Primary | ICD-10-CM

## 2023-02-17 LAB
ALBUMIN SERPL BCP-MCNC: 3 G/DL (ref 3.5–5.2)
ALP SERPL-CCNC: 51 U/L (ref 55–135)
ALT SERPL W/O P-5'-P-CCNC: 145 U/L (ref 10–44)
ANION GAP SERPL CALC-SCNC: 7 MMOL/L (ref 8–16)
AST SERPL-CCNC: 44 U/L (ref 10–40)
BASOPHILS # BLD AUTO: 0.03 K/UL (ref 0–0.2)
BASOPHILS NFR BLD: 0.3 % (ref 0–1.9)
BILIRUB SERPL-MCNC: 0.2 MG/DL (ref 0.1–1)
BILIRUB UR QL STRIP: NEGATIVE
BUN SERPL-MCNC: 8 MG/DL (ref 6–20)
CALCIUM SERPL-MCNC: 9 MG/DL (ref 8.7–10.5)
CHLORIDE SERPL-SCNC: 107 MMOL/L (ref 95–110)
CLARITY UR REFRACT.AUTO: CLEAR
CO2 SERPL-SCNC: 23 MMOL/L (ref 23–29)
COLOR UR AUTO: COLORLESS
CREAT SERPL-MCNC: 0.7 MG/DL (ref 0.5–1.4)
CREAT UR-MCNC: 38 MG/DL (ref 15–325)
DIFFERENTIAL METHOD: ABNORMAL
EOSINOPHIL # BLD AUTO: 0.2 K/UL (ref 0–0.5)
EOSINOPHIL NFR BLD: 1.8 % (ref 0–8)
ERYTHROCYTE [DISTWIDTH] IN BLOOD BY AUTOMATED COUNT: 12.8 % (ref 11.5–14.5)
EST. GFR  (NO RACE VARIABLE): >60 ML/MIN/1.73 M^2
GLUCOSE SERPL-MCNC: 92 MG/DL (ref 70–110)
GLUCOSE UR QL STRIP: NEGATIVE
HCT VFR BLD AUTO: 37.4 % (ref 37–48.5)
HGB BLD-MCNC: 12.2 G/DL (ref 12–16)
HGB UR QL STRIP: NEGATIVE
IMM GRANULOCYTES # BLD AUTO: 0.09 K/UL (ref 0–0.04)
IMM GRANULOCYTES NFR BLD AUTO: 0.8 % (ref 0–0.5)
KETONES UR QL STRIP: NEGATIVE
LEUKOCYTE ESTERASE UR QL STRIP: NEGATIVE
LYMPHOCYTES # BLD AUTO: 1.6 K/UL (ref 1–4.8)
LYMPHOCYTES NFR BLD: 14.6 % (ref 18–48)
MCH RBC QN AUTO: 31.4 PG (ref 27–31)
MCHC RBC AUTO-ENTMCNC: 32.6 G/DL (ref 32–36)
MCV RBC AUTO: 96 FL (ref 82–98)
MONOCYTES # BLD AUTO: 0.5 K/UL (ref 0.3–1)
MONOCYTES NFR BLD: 4.9 % (ref 4–15)
NEUTROPHILS # BLD AUTO: 8.4 K/UL (ref 1.8–7.7)
NEUTROPHILS NFR BLD: 77.6 % (ref 38–73)
NITRITE UR QL STRIP: NEGATIVE
NRBC BLD-RTO: 0 /100 WBC
PH UR STRIP: 7 [PH] (ref 5–8)
PLATELET # BLD AUTO: 196 K/UL (ref 150–450)
PMV BLD AUTO: 10.9 FL (ref 9.2–12.9)
POTASSIUM SERPL-SCNC: 4.1 MMOL/L (ref 3.5–5.1)
PROT SERPL-MCNC: 6.2 G/DL (ref 6–8.4)
PROT UR QL STRIP: NEGATIVE
PROT UR-MCNC: <7 MG/DL (ref 0–15)
PROT/CREAT UR: NORMAL MG/G{CREAT} (ref 0–0.2)
RBC # BLD AUTO: 3.88 M/UL (ref 4–5.4)
SODIUM SERPL-SCNC: 137 MMOL/L (ref 136–145)
SP GR UR STRIP: 1 (ref 1–1.03)
URN SPEC COLLECT METH UR: ABNORMAL
WBC # BLD AUTO: 10.82 K/UL (ref 3.9–12.7)

## 2023-02-17 PROCEDURE — 99999 PR PBB SHADOW E&M-EST. PATIENT-LVL III: CPT | Mod: PBBFAC,,, | Performed by: STUDENT IN AN ORGANIZED HEALTH CARE EDUCATION/TRAINING PROGRAM

## 2023-02-17 PROCEDURE — 0502F SUBSEQUENT PRENATAL CARE: CPT | Mod: ,,, | Performed by: STUDENT IN AN ORGANIZED HEALTH CARE EDUCATION/TRAINING PROGRAM

## 2023-02-17 PROCEDURE — 99213 OFFICE O/P EST LOW 20 MIN: CPT | Mod: PBBFAC | Performed by: STUDENT IN AN ORGANIZED HEALTH CARE EDUCATION/TRAINING PROGRAM

## 2023-02-17 PROCEDURE — 36415 COLL VENOUS BLD VENIPUNCTURE: CPT | Performed by: STUDENT IN AN ORGANIZED HEALTH CARE EDUCATION/TRAINING PROGRAM

## 2023-02-17 PROCEDURE — 85025 COMPLETE CBC W/AUTO DIFF WBC: CPT | Performed by: STUDENT IN AN ORGANIZED HEALTH CARE EDUCATION/TRAINING PROGRAM

## 2023-02-17 PROCEDURE — 81003 URINALYSIS AUTO W/O SCOPE: CPT | Performed by: STUDENT IN AN ORGANIZED HEALTH CARE EDUCATION/TRAINING PROGRAM

## 2023-02-17 PROCEDURE — 84156 ASSAY OF PROTEIN URINE: CPT | Performed by: STUDENT IN AN ORGANIZED HEALTH CARE EDUCATION/TRAINING PROGRAM

## 2023-02-17 PROCEDURE — 99999 PR PBB SHADOW E&M-EST. PATIENT-LVL III: ICD-10-PCS | Mod: PBBFAC,,, | Performed by: STUDENT IN AN ORGANIZED HEALTH CARE EDUCATION/TRAINING PROGRAM

## 2023-02-17 PROCEDURE — 0502F PR SUBSEQUENT PRENATAL CARE: ICD-10-PCS | Mod: ,,, | Performed by: STUDENT IN AN ORGANIZED HEALTH CARE EDUCATION/TRAINING PROGRAM

## 2023-02-17 PROCEDURE — 87086 URINE CULTURE/COLONY COUNT: CPT | Performed by: STUDENT IN AN ORGANIZED HEALTH CARE EDUCATION/TRAINING PROGRAM

## 2023-02-17 PROCEDURE — 80053 COMPREHEN METABOLIC PANEL: CPT | Performed by: STUDENT IN AN ORGANIZED HEALTH CARE EDUCATION/TRAINING PROGRAM

## 2023-02-17 RX ORDER — CEPHALEXIN 500 MG/1
500 CAPSULE ORAL EVERY 12 HOURS
Qty: 14 CAPSULE | Refills: 0 | Status: SHIPPED | OUTPATIENT
Start: 2023-02-17 | End: 2023-02-24

## 2023-02-17 RX ORDER — PROCHLORPERAZINE MALEATE 10 MG
10 TABLET ORAL EVERY 6 HOURS PRN
Qty: 20 TABLET | Refills: 0 | Status: SHIPPED | OUTPATIENT
Start: 2023-02-17 | End: 2023-02-22

## 2023-02-17 NOTE — PROGRESS NOTES
Vannesa is here for Santana visit at 21w3d. Has been having some pelvic discomford/spasms mostly after voiding. Can be quite intense and painful. Also with sensation of pelvic pressure.  No VB, no LOF, ctx, +FM.    Initial /90, 15 min repeat was 142/82. Currently denies HA, vision changes, RUQ pain, CP, SOB.  Has been getting HA off/on. Trying to avoid meds but if they dont go away she takes tylenol which helps.     BP (!) 144/90   Wt 95.5 kg (210 lb 8.6 oz)   LMP 2022 (Exact Date)   BMI 36.14 kg/m²     32 y.o., at 21w3d by Estimated Date of Delivery: 23  Patient Active Problem List   Diagnosis    Lumbar back pain with radiculopathy affecting lower extremity    Chiari malformation type I     OB History    Para Term  AB Living   1 0 0 0 0 0   SAB IAB Ectopic Multiple Live Births   0 0 0 0 0      # Outcome Date GA Lbr Junior/2nd Weight Sex Delivery Anes PTL Lv   1 Current                Dating reviewed    Allergies and problem list reviewed and updated    Medical and surgical history reviewed    Prenatal labs reviewed and updated    Physical Exam:  ABD: soft, gravid, nontender, +FHT    Assessment:  OB 21w3d   Elevated BP    Plan:   - Asymptomatic at this time, outpatient labs   - plan to complete connected mom enrollment to faciliate BP monitoring  - BP values and preE sx were reviewed, as were indications to call L&D and/or present to OB ED    F/u as sched. Precautions as above.    Vivienne Almanza MD  Obstetrics & Gynecology   [facility]

## 2023-02-18 NOTE — TELEPHONE ENCOUNTER
preE labs reviewed. Note bump in LFTs in setting of chronic transaminitis. D/w on call MFM. We both agree that preE with SF is unlikely at this point given pre-pregnancy abnormalities and lack of symptoms/proteinuria etc. Dr Velásquez recommends pt f/u with MFM next week for formal consult.  The above was d/w pt via telephone.  She endorses long history of intermittently abnormal LFTs beginning in college. Says labs normalized with gluten free diet at some point in the past. Celiac w/u was recommended but she did not pursue this. Further liver workup was never pursued as findings were isolated to LFTs and only mildly elevated.   We did do hepatitis panel previously which was neg.   I advised pt to avoid tylenol at this point. For her occasional HAs (not new) I have called in compazine for her to use at home. If HA not improved with compazine, recommend OB ED evaluation as previously discussed.   MFM consut placed. Answered all questions to pts satisfaction.

## 2023-02-19 LAB
BACTERIA UR CULT: NORMAL
BACTERIA UR CULT: NORMAL

## 2023-02-22 ENCOUNTER — PATIENT MESSAGE (OUTPATIENT)
Dept: OBSTETRICS AND GYNECOLOGY | Facility: CLINIC | Age: 33
End: 2023-02-22
Payer: OTHER GOVERNMENT

## 2023-02-23 ENCOUNTER — PATIENT MESSAGE (OUTPATIENT)
Dept: MATERNAL FETAL MEDICINE | Facility: CLINIC | Age: 33
End: 2023-02-23
Payer: OTHER GOVERNMENT

## 2023-02-24 ENCOUNTER — PATIENT MESSAGE (OUTPATIENT)
Dept: OBSTETRICS AND GYNECOLOGY | Facility: CLINIC | Age: 33
End: 2023-02-24
Payer: OTHER GOVERNMENT

## 2023-02-24 ENCOUNTER — TELEPHONE (OUTPATIENT)
Dept: HEPATOLOGY | Facility: CLINIC | Age: 33
End: 2023-02-24
Payer: OTHER GOVERNMENT

## 2023-02-24 ENCOUNTER — LAB VISIT (OUTPATIENT)
Dept: LAB | Facility: OTHER | Age: 33
End: 2023-02-24
Attending: OBSTETRICS & GYNECOLOGY
Payer: OTHER GOVERNMENT

## 2023-02-24 ENCOUNTER — OFFICE VISIT (OUTPATIENT)
Dept: MATERNAL FETAL MEDICINE | Facility: CLINIC | Age: 33
End: 2023-02-24
Attending: OBSTETRICS & GYNECOLOGY
Payer: OTHER GOVERNMENT

## 2023-02-24 ENCOUNTER — PATIENT MESSAGE (OUTPATIENT)
Dept: RESEARCH | Facility: HOSPITAL | Age: 33
End: 2023-02-24
Payer: OTHER GOVERNMENT

## 2023-02-24 ENCOUNTER — PROCEDURE VISIT (OUTPATIENT)
Dept: MATERNAL FETAL MEDICINE | Facility: CLINIC | Age: 33
End: 2023-02-24
Payer: OTHER GOVERNMENT

## 2023-02-24 VITALS
WEIGHT: 210.75 LBS | SYSTOLIC BLOOD PRESSURE: 157 MMHG | BODY MASS INDEX: 35.98 KG/M2 | HEIGHT: 64 IN | DIASTOLIC BLOOD PRESSURE: 80 MMHG

## 2023-02-24 DIAGNOSIS — O16.2 ELEVATED BLOOD PRESSURE AFFECTING PREGNANCY IN SECOND TRIMESTER, ANTEPARTUM: Primary | ICD-10-CM

## 2023-02-24 DIAGNOSIS — R74.8 ELEVATED LIVER ENZYMES: ICD-10-CM

## 2023-02-24 DIAGNOSIS — R74.8 ELEVATED LIVER ENZYMES: Primary | ICD-10-CM

## 2023-02-24 DIAGNOSIS — Z36.2 ENCOUNTER FOR FOLLOW-UP ULTRASOUND OF FETAL ANATOMY: ICD-10-CM

## 2023-02-24 DIAGNOSIS — O16.2 ELEVATED BLOOD PRESSURE AFFECTING PREGNANCY IN SECOND TRIMESTER, ANTEPARTUM: ICD-10-CM

## 2023-02-24 DIAGNOSIS — Z36.89 ENCOUNTER FOR ULTRASOUND TO CHECK FETAL GROWTH: ICD-10-CM

## 2023-02-24 LAB
ALBUMIN SERPL BCP-MCNC: 3.2 G/DL (ref 3.5–5.2)
ALP SERPL-CCNC: 57 U/L (ref 55–135)
ALT SERPL W/O P-5'-P-CCNC: 72 U/L (ref 10–44)
AST SERPL-CCNC: 25 U/L (ref 10–40)
BILIRUB DIRECT SERPL-MCNC: 0.1 MG/DL (ref 0.1–0.3)
BILIRUB SERPL-MCNC: 0.2 MG/DL (ref 0.1–1)
GGT SERPL-CCNC: 16 U/L (ref 8–55)
PROT SERPL-MCNC: 6.9 G/DL (ref 6–8.4)

## 2023-02-24 PROCEDURE — 99204 PR OFFICE/OUTPT VISIT, NEW, LEVL IV, 45-59 MIN: ICD-10-PCS | Mod: S$PBB,25,, | Performed by: OBSTETRICS & GYNECOLOGY

## 2023-02-24 PROCEDURE — 76816 PR  US,PREGNANT UTERUS,F/U,TRANSABD APP: ICD-10-PCS | Mod: 26,S$PBB,, | Performed by: OBSTETRICS & GYNECOLOGY

## 2023-02-24 PROCEDURE — 76816 OB US FOLLOW-UP PER FETUS: CPT | Mod: PBBFAC | Performed by: OBSTETRICS & GYNECOLOGY

## 2023-02-24 PROCEDURE — 76816 OB US FOLLOW-UP PER FETUS: CPT | Mod: 26,S$PBB,, | Performed by: OBSTETRICS & GYNECOLOGY

## 2023-02-24 PROCEDURE — 80076 HEPATIC FUNCTION PANEL: CPT | Performed by: OBSTETRICS & GYNECOLOGY

## 2023-02-24 PROCEDURE — 99204 OFFICE O/P NEW MOD 45 MIN: CPT | Mod: S$PBB,25,, | Performed by: OBSTETRICS & GYNECOLOGY

## 2023-02-24 PROCEDURE — 99999 PR PBB SHADOW E&M-EST. PATIENT-LVL III: CPT | Mod: PBBFAC,,, | Performed by: OBSTETRICS & GYNECOLOGY

## 2023-02-24 PROCEDURE — 82977 ASSAY OF GGT: CPT | Performed by: OBSTETRICS & GYNECOLOGY

## 2023-02-24 PROCEDURE — 99213 OFFICE O/P EST LOW 20 MIN: CPT | Mod: PBBFAC | Performed by: OBSTETRICS & GYNECOLOGY

## 2023-02-24 PROCEDURE — 99999 PR PBB SHADOW E&M-EST. PATIENT-LVL III: ICD-10-PCS | Mod: PBBFAC,,, | Performed by: OBSTETRICS & GYNECOLOGY

## 2023-02-24 PROCEDURE — 82239 BILE ACIDS TOTAL: CPT | Performed by: OBSTETRICS & GYNECOLOGY

## 2023-02-24 RX ORDER — ALPRAZOLAM 0.25 MG/1
0.25 TABLET ORAL 2 TIMES DAILY PRN
Qty: 60 TABLET | Refills: 0 | Status: SHIPPED | OUTPATIENT
Start: 2023-02-24 | End: 2023-06-07

## 2023-02-24 RX ORDER — LABETALOL 100 MG/1
100 TABLET, FILM COATED ORAL 2 TIMES DAILY
Qty: 60 TABLET | Refills: 11 | Status: SHIPPED | OUTPATIENT
Start: 2023-02-24 | End: 2023-06-24

## 2023-02-24 NOTE — PROGRESS NOTES
Chief complaint: HTN with long standing transaminitis    Provider requesting consultation: Dr. Marshall    32 y.o. [id number] 22w3d EGA.    PMH:  Past Medical History:   Diagnosis Date    Anxiety disorder, unspecified 2022    Chiari malformation type I     Seizures 2007    Grand mal once  EEG  photot sensitivty        PObHx:  OB History    Para Term  AB Living   1 0 0 0 0 0   SAB IAB Ectopic Multiple Live Births   0 0 0 0 0      # Outcome Date GA Lbr Junior/2nd Weight Sex Delivery Anes PTL Lv   1 Current                PSH:  Past Surgical History:   Procedure Laterality Date    KNEE ARTHROSCOPY W/ DEBRIDEMENT Left     MCL tear     Widsom Teeth Extraction Bilateral        Family history:family history is not on file.    Social history: reports that she has never smoked. She has never used smokeless tobacco. She reports that she does not currently use alcohol. She reports that she does not currently use drugs after having used the following drugs: Methamphetamines.    A detailed fetal anatomical ultrasound was completed today.  See details in imaging section of EP[facility].    The patient is referred for hypertension and increase transaminase levels in pregnancy.  Her elevated transaminases are longstanding outside of pregnancy.  In addition, she has had hypertensive readings in the past outside of pregnancy and she has a strong family history of hypertension.  In her family the hypertension is frequently associated with anxiety as there is also a strong family history of anxiety and panic attacks.  Her PC ratios have been undetectable.  With all this in mind I do not believe she has preeclampsia at the present time.  In addition I do not believe that her hypertension is gestational hypertension, I think she falls into the category of latent chronic hypertension and I counseled her as such.  Her initial blood pressure in the office today was 158/87 this came down to 144/80 on repeat.    Chronic  Hypertension  Today I counseled the patient on maternal/fetal risks associated with CHTN during pregnancy. Risks include but not limited to fetal growth restriction, miscarriage, abruption, maternal end organ disease (renal failure, MI, and stroke),  delivery, development of superimposed preeclampsia, and eclampsia. She was counseled on the recommendations for blood pressure control, serial ultrasound for fetal growth assessment and  testing, and timing of delivery. I also counseled her on the recommendation for aspirin 81 mg daily which may decrease her risk of developing superimposed preeclampsia.     Recommendations (Please refer to [facility] guidelines):  Initiate aspirin 81 mg daily at 12-16 weeks gestation for preeclampsia risk reduction  Continue current medications: She was started on Labetalol 100 mg bid today.  In addition, because her elevated blood pressures appear to be associated with her anxiety and panic attacks I restarted her Xanax 0.25 mg p.r.n. today.  Baseline evaluation with primary OB:   24-hour urine protein or baseline P/C ratio, CMP, and CBC.  Maternal EKG  Maternal ophthalmic evaluation  Serial fetal growth ultrasounds every 4-6 weeks, beginning at 26-28 weeks.   Continued close observation of patient's blood pressures. Avoid hypotension as this has been associated with uteroplacental insufficiency.  Recommend treatment to a goal blood pressure < 140/90  For women with evidence of end-organ damage (prior CVA, renal disease, etc) or co-morbid conditions (ie diabetes), a lower threshold may be recommended  Weekly antepartum testing at 32 weeks (NST+AFV); twice weekly testing if control is poor, multiple comorbidities are present, or requires several medications for control     Delivery timing:  No medications, no comorbid conditions: 39 0/7 - 39 6/7 weeks gestation  No medications, comorbid conditions: 38 0/7 - 38 6/7 weeks gestation  Controlled on single agent, no comorbid  conditions*: 38 0/7 - 38 6/7 weeks gestation  Controlled on single agent, comorbid conditions*: 37 0/7 - 38 6/7 weeks gestation  Uncontrolled or requiring ? 2 medications: 36 0/7 - 37 6/7 weeks gestation    *Comorbid conditions include BMI >= 40, diabetes, and complex medical condition associated with placental dysfunction (ie lupus or other vascular disease)  Delivery may be recommended earlier pending results of fetal growth ultrasounds, AFV assessment, or antepartum testing results.    With respect to her transaminitis, I have scheduled a hepatology consult in ordered an ultrasound of her liver.  These are mild elevations that have been longstanding and may be associated with benign fatty liver or gallbladder pathology.  I also ordered a gamma GT and liver profile.  I will review these when available in await the hepatology consult.  I also requested that Dr. Almanza fill her Xanax prescription as my RSA is out of date.    The patient was given an opportunity to ask questions about management and the diease process.  She expressed an understanding of and agreement to the above impression and plan. All questions were answered to her satisfaction.  She was given contact information to the [facility] clinic to address further concerns.

## 2023-02-24 NOTE — TELEPHONE ENCOUNTER
Appointment scheduled with BRYN Betts.  Patient notified.      ----- Message from Vinicius Rosario MA sent at 2/24/2023  1:16 PM CST -----  Please see message below. If its okay for her to have liver u/s please put order in so I can schedule it for her.    thanks  ----- Message -----  From: Mady Osorio MA  Sent: 2/24/2023  11:57 AM CST  To: Dr. Alex Turner saw this patient today. She is currently 22w3d pregnant and has persistent transaminitis not related to pregnancy. He would like for her to be scheduled for a liver ultrasound before doing a consult. Please let me know if you are able to help me get this patient scheduled.    Thanks so much!  Keely

## 2023-02-27 ENCOUNTER — PATIENT MESSAGE (OUTPATIENT)
Dept: MATERNAL FETAL MEDICINE | Facility: CLINIC | Age: 33
End: 2023-02-27
Payer: OTHER GOVERNMENT

## 2023-02-27 LAB — BILE AC SERPL-SCNC: 4 MCMOL/L

## 2023-02-28 ENCOUNTER — TELEPHONE (OUTPATIENT)
Dept: OBSTETRICS AND GYNECOLOGY | Facility: CLINIC | Age: 33
End: 2023-02-28
Payer: OTHER GOVERNMENT

## 2023-02-28 ENCOUNTER — PATIENT MESSAGE (OUTPATIENT)
Dept: MATERNAL FETAL MEDICINE | Facility: CLINIC | Age: 33
End: 2023-02-28
Payer: OTHER GOVERNMENT

## 2023-02-28 NOTE — TELEPHONE ENCOUNTER
Spoke with pt about her concerns with labetalol- took yesterday and felt bad, headache and fatigued. Her BPs at home are all normal. Finds that she is very anxious at apts when BPs are elevated.   For now, we discussed it is ok to hold antihypertensive while treating anxiety. If BP continue to be elevated at home or in the office despite anxiety treatment we will need to treat, which she agrees with. She will continue BP checks at home at time labetalol typically would be due.

## 2023-03-06 ENCOUNTER — OFFICE VISIT (OUTPATIENT)
Dept: TRANSPLANT | Facility: CLINIC | Age: 33
End: 2023-03-06
Payer: OTHER GOVERNMENT

## 2023-03-06 ENCOUNTER — PATIENT MESSAGE (OUTPATIENT)
Dept: TRANSPLANT | Facility: CLINIC | Age: 33
End: 2023-03-06

## 2023-03-06 DIAGNOSIS — R74.8 ELEVATED LIVER ENZYMES: Primary | ICD-10-CM

## 2023-03-06 DIAGNOSIS — R79.89 ELEVATED LIVER FUNCTION TESTS: Primary | ICD-10-CM

## 2023-03-06 DIAGNOSIS — R74.8 ELEVATED LIVER ENZYMES: ICD-10-CM

## 2023-03-06 DIAGNOSIS — F41.9 ANXIETY: ICD-10-CM

## 2023-03-06 PROCEDURE — 99203 PR OFFICE/OUTPT VISIT, NEW, LEVL III, 30-44 MIN: ICD-10-PCS | Mod: 95,,, | Performed by: INTERNAL MEDICINE

## 2023-03-06 PROCEDURE — 99203 OFFICE O/P NEW LOW 30 MIN: CPT | Mod: 95,,, | Performed by: INTERNAL MEDICINE

## 2023-03-06 NOTE — Clinical Note
1. Check liver panel every 2 weeks- tomorrow and then 3/20/23 2. Abdo US limited to look at the liver before 3/22/23 3. Autoimmune markers tomorrow 4. Return 3/22/23 for video visit

## 2023-03-06 NOTE — PROGRESS NOTES
"The patient location is: home  The chief complaint leading to consultation is: eleated liver tests in the setting of pregnancy    Visit type: audiovisual    Face to Face time with patient: 20 minutes  45 minutes of total time spent on the encounter, which includes face to face time and non-face to face time preparing to see the patient (eg, review of tests), Obtaining and/or reviewing separately obtained history, Documenting clinical information in the electronic or other health record, Independently interpreting results (not separately reported) and communicating results to the patient/family/caregiver, or Care coordination (not separately reported).         Each patient to whom he or she provides medical services by telemedicine is:  (1) informed of the relationship between the physician and patient and the respective role of any other health care provider with respect to management of the patient; and (2) notified that he or she may decline to receive medical services by telemedicine and may withdraw from such care at any time.    Notes: HEPATOLOGY CONSULTATION    Referring Physician: Self-referral  Current Corresponding Physician: Jolie Roberson MD, Vivienne Almanza MD    Reason for Consultation: Consultation for evaluation of Elevated Hepatic Enzymes    History of Present Illness: Vannesa Wilkins is a 32 y.o. female with a hx of anxiety, Chiari malformation type 1 and chronic lower back pain who is currently 24 weeks pregnant and has elevated liver enzymes. She has had ++N/V during pregnancy. Still on antiemetics.    Hx elevated liver enzymes:  -in college had "very high liver enzymes"; had abdo US  --5/12/20: ALT 25, AST 15, ALKP 49 (working with nutritionist-eating gluten-free and keto diet; mediterranean diet); felt healthy  --09/2021- no longer eating as well  --3/1/22: elevated liver tests in the ER  --10/5/22: ALT 80, AST 34, ALKP 62. Tbil 0.3  --11/8/22: ALT 79, AST 36, ALKP 48, Tbil <0.1  HCV Ab-, " HBsAg-, (HBcIgM-, HAV IgM- previously), HIV-  --2/17/23: , AST 44, ALKP 51, Tbil 0.2 (normal prenatal visit - labs done due to high BP); no proteinuria; still has nausea if does not take unisom  --2/24/23: ALT 72, AST 25, ALKP 57, Tbil 0.2    --no pruritus  --no RUQ  --alcohol: none  --BMI: 28 (before pregnancy)  --denies any symptoms of decompensated cirrhosis, including no ascites or edema, cognitive problems that would suggest hepatic encephalopathy, or GI bleeding from varices.     Chief Complaint   Patient presents with    Elevated Hepatic Enzymes       Past Medical History:   Diagnosis Date    Anxiety disorder, unspecified 08/2022    Chiari malformation type I     Seizures 2007    Grand mal once  EEG  photot sensitivty      Outpatient Encounter Medications as of 3/6/2023   Medication Sig Dispense Refill    ALPRAZolam (XANAX) 0.25 MG tablet Take 1 tablet (0.25 mg total) by mouth 2 (two) times daily as needed for Anxiety. 60 tablet 0    diphenhydramine HCl (UNISOM, DIPHENHYDRAMINE, ORAL) Take by mouth.      doxylamine succinate/vit B6 (DOXYLAMINE-PYRIDOXINE, VIT B6, ORAL) Take by mouth.      labetaloL (NORMODYNE) 100 MG tablet Take 1 tablet (100 mg total) by mouth 2 (two) times daily. 60 tablet 11    ondansetron (ZOFRAN-ODT) 4 MG TbDL Take 1-2 tablets every 8 hours as needed for nausea (Patient not taking: Reported on 2/24/2023) 30 tablet 1    prenatal vit no.124/iron/folic (PRENATAL VITAMIN ORAL) Take by mouth.      prochlorperazine (COMPAZINE) 10 MG tablet TAKE 1 TABLET(10 MG) BY MOUTH EVERY 6 HOURS AS NEEDED FOR HEADACHE 20 tablet 0     No facility-administered encounter medications on file as of 3/6/2023.     Review of patient's allergies indicates:  No Known Allergies  Family History   Problem Relation Age of Onset    Ovarian cancer Neg Hx     Colon cancer Neg Hx     Breast cancer Neg Hx        Social History     Socioeconomic History    Marital status:    Tobacco Use    Smoking status: Never     Smokeless tobacco: Never   Substance and Sexual Activity    Alcohol use: Not Currently     Comment: pre preg    Drug use: Not Currently     Types: Methamphetamines    Sexual activity: Yes     Partners: Male     Birth control/protection: None     Review of Systems   Constitutional: Negative.    HENT: Negative.     Eyes: Negative.    Respiratory: Negative.     Cardiovascular: Negative.    Gastrointestinal: Negative.    Genitourinary: Negative.    Musculoskeletal: Negative.    Skin: Negative.    Neurological: Negative.    Psychiatric/Behavioral: Negative.       There were no vitals filed for this visit.    Physical Exam        Lab Results   Component Value Date    GLU 92 02/17/2023    BUN 8 02/17/2023    CREATININE 0.7 02/17/2023    CALCIUM 9.0 02/17/2023     02/17/2023    K 4.1 02/17/2023     02/17/2023    PROT 6.9 02/24/2023    CO2 23 02/17/2023    ANIONGAP 7 (L) 02/17/2023    WBC 10.82 02/17/2023    RBC 3.88 (L) 02/17/2023    HGB 12.2 02/17/2023    HCT 37.4 02/17/2023    MCV 96 02/17/2023    MCH 31.4 (H) 02/17/2023    MCHC 32.6 02/17/2023     Lab Results   Component Value Date    RDW 12.8 02/17/2023     02/17/2023    MPV 10.9 02/17/2023    GRAN 8.4 (H) 02/17/2023    GRAN 77.6 (H) 02/17/2023    LYMPH 1.6 02/17/2023    LYMPH 14.6 (L) 02/17/2023    MONO 0.5 02/17/2023    MONO 4.9 02/17/2023    EOSINOPHIL 1.8 02/17/2023    BASOPHIL 0.3 02/17/2023    EOS 0.2 02/17/2023    BASO 0.03 02/17/2023    GROUPTRH A POS 11/15/2022    CHOL 200 (H) 05/12/2020    TRIG 89 05/12/2020    HDL 64 05/12/2020    CHOLHDL 32.0 05/12/2020    TOTALCHOLEST 3.1 05/12/2020    ALBUMIN 3.2 (L) 02/24/2023    BILIDIR 0.1 02/24/2023    AST 25 02/24/2023    ALT 72 (H) 02/24/2023    ALKPHOS 57 02/24/2023       Assessment and Plan:    Vannesa Wilkins is a 32 y.o. female who is currently 24 weeks pregnant and has elevated liver enzymes. Her elevated liver enzymes predate pregancy and thus may be unrelated to her pregnant state. Etiologies  such as NAFLD or autoimmune hepatitis are on the differential. She has had (and may still have) hyperemesis gravidarum may be contributing. Preeclampsia seems unlikely since although she has htn, she has no proteinuria. Other liver diseases in pregnancy unlikely: no pruritus and no elevated ALKP making ICP unlikely; only in second trimester and enzymes coming down and no pain making acute fatty liver of pregnancy unlikely. HCV, HAV and HBV have bene ruled out by serologies. Symptomatic cholelithiasis unlikely since has no RUQ pain.    At this point since her liver tests came down I am recommending a hepatic panel every 2 weeks. I will check autoimmune markers now and a limited abdo US. Return 2 weeks by video visit.

## 2023-03-06 NOTE — PATIENT INSTRUCTIONS
Check liver panel every 2 weeks  Abdo US limited to look at the liver  Autoimmune markers  Retrun 3/22/23 for video visit

## 2023-03-06 NOTE — Clinical Note
1. Check liver panel every 2 weeks 2. Abdo US limited to look at the liver now 3. Autoimmune markers now 4. Return 3/22/23 for video visit

## 2023-03-07 ENCOUNTER — TELEPHONE (OUTPATIENT)
Dept: HEPATOLOGY | Facility: CLINIC | Age: 33
End: 2023-03-07
Payer: OTHER GOVERNMENT

## 2023-03-07 ENCOUNTER — PATIENT MESSAGE (OUTPATIENT)
Dept: OTHER | Facility: OTHER | Age: 33
End: 2023-03-07
Payer: OTHER GOVERNMENT

## 2023-03-07 NOTE — TELEPHONE ENCOUNTER
1. Check liver panel every 2 weeks   2. Abdo US limited to look at the liver now   3. Autoimmune markers now   4. Return 3/22/23 for video visit     Appts schd and pt notified. AIDAN OJEDA

## 2023-03-07 NOTE — TELEPHONE ENCOUNTER
----- Message from Carol Mcbride MD sent at 3/7/2023  1:29 PM CST -----  1. Check liver panel every 2 weeks  2. Abdo US limited to look at the liver now  3. Autoimmune markers now  4. Return 3/22/23 for video visit

## 2023-03-14 ENCOUNTER — HOSPITAL ENCOUNTER (OUTPATIENT)
Dept: RADIOLOGY | Facility: HOSPITAL | Age: 33
Discharge: HOME | End: 2023-03-14
Attending: INTERNAL MEDICINE
Payer: OTHER GOVERNMENT

## 2023-03-14 ENCOUNTER — PATIENT MESSAGE (OUTPATIENT)
Dept: HEPATOLOGY | Facility: CLINIC | Age: 33
End: 2023-03-14
Payer: OTHER GOVERNMENT

## 2023-03-14 ENCOUNTER — PATIENT MESSAGE (OUTPATIENT)
Dept: OBSTETRICS AND GYNECOLOGY | Facility: CLINIC | Age: 33
End: 2023-03-14
Payer: OTHER GOVERNMENT

## 2023-03-14 DIAGNOSIS — R79.89 ELEVATED LIVER FUNCTION TESTS: ICD-10-CM

## 2023-03-14 PROCEDURE — 76705 ECHO EXAM OF ABDOMEN: CPT | Mod: TC

## 2023-03-14 PROCEDURE — 76705 ECHO EXAM OF ABDOMEN: CPT | Mod: 26,,, | Performed by: RADIOLOGY

## 2023-03-14 PROCEDURE — 76705 US ABDOMEN LIMITED: ICD-10-PCS | Mod: 26,,, | Performed by: RADIOLOGY

## 2023-03-16 ENCOUNTER — TELEPHONE (OUTPATIENT)
Dept: HEPATOLOGY | Facility: CLINIC | Age: 33
End: 2023-03-16
Payer: OTHER GOVERNMENT

## 2023-03-20 ENCOUNTER — OFFICE VISIT (OUTPATIENT)
Dept: HEPATOLOGY | Facility: CLINIC | Age: 33
End: 2023-03-20
Payer: OTHER GOVERNMENT

## 2023-03-20 ENCOUNTER — TELEPHONE (OUTPATIENT)
Dept: FAMILY MEDICINE | Facility: CLINIC | Age: 33
End: 2023-03-20
Payer: OTHER GOVERNMENT

## 2023-03-20 DIAGNOSIS — R74.8 ELEVATED LIVER ENZYMES: Primary | ICD-10-CM

## 2023-03-20 PROCEDURE — 99213 OFFICE O/P EST LOW 20 MIN: CPT | Mod: 95,,, | Performed by: INTERNAL MEDICINE

## 2023-03-20 PROCEDURE — 99213 PR OFFICE/OUTPT VISIT, EST, LEVL III, 20-29 MIN: ICD-10-PCS | Mod: 95,,, | Performed by: INTERNAL MEDICINE

## 2023-03-20 NOTE — PROGRESS NOTES
"The patient location is: home  The chief complaint leading to consultation is: elevated liver enzymes in the setting of pregnancy    Visit type: audiovisual    Face to Face time with patient: 15 minutes  30 minutes of total time spent on the encounter, which includes face to face time and non-face to face time preparing to see the patient (eg, review of tests), Obtaining and/or reviewing separately obtained history, Documenting clinical information in the electronic or other health record, Independently interpreting results (not separately reported) and communicating results to the patient/family/caregiver, or Care coordination (not separately reported).         Each patient to whom he or she provides medical services by telemedicine is:  (1) informed of the relationship between the physician and patient and the respective role of any other health care provider with respect to management of the patient; and (2) notified that he or she may decline to receive medical services by telemedicine and may withdraw from such care at any time.    Notes: HEPATOLOGY FOLLOW UP    Referring Physician:   Current Corresponding Physician: Jolie Roberson MD, Vivienne Almanza MD    Vannesa Wilkins is here for follow up of Elevated Hepatic Enzymes      HPI  Vannesa Wilkins is a 32 y.o. female with a hx of anxiety, Chiari malformation type 1 and chronic lower back pain who was 24 weeks pregnant when I saw her in consultation 3/7/23 for an evaluation of elevated liver enzymes. She was having ++N/V during pregnancy. Still on antiemetics.     Hx elevated liver enzymes:  -in college had "very high liver enzymes"; had abdo US  --5/12/20: ALT 25, AST 15, ALKP 49 (working with nutritionist-eating gluten-free and keto diet; mediterranean diet); felt healthy  --09/2021- no longer eating as well  --3/1/22: elevated liver tests in the ER  --10/5/22: ALT 80, AST 34, ALKP 62. Tbil 0.3  --11/8/22: ALT 79, AST 36, ALKP 48, Tbil <0.1  HCV Ab-, HBsAg-, " (HBcIgM-, HAV IgM- previously), HIV-  --2/17/23: , AST 44, ALKP 51, Tbil 0.2 (normal prenatal visit - labs done due to high BP); no proteinuria; still has nausea if does not take unisom  --2/24/23: ALT 72, AST 25, ALKP 57, Tbil 0.2     --no pruritus  --no RUQ  --alcohol: none  --BMI: 28 (before pregnancy)  --denies any symptoms of decompensated cirrhosis, including no ascites or edema, cognitive problems that would suggest hepatic encephalopathy, or GI bleeding from varices.     Interval History  Impression at time of consult: currently 24 weeks pregnant and has elevated liver enzymes. Her elevated liver enzymes predate pregancy and thus may be unrelated to her pregnant state. Etiologies such as NAFLD or autoimmune hepatitis are on the differential. She has had (and may still have) hyperemesis gravidarum may be contributing. Preeclampsia seems unlikely since although she has htn, she has no proteinuria. Other liver diseases in pregnancy unlikely: no pruritus and no elevated ALKP making ICP unlikely; only in second trimester and enzymes coming down and no pain making acute fatty liver of pregnancy unlikely. HCV, HAV and HBV have bene ruled out by serologies. Symptomatic cholelithiasis unlikely since has no RUQ pain. I am recommended a hepatic panel every 2 weeks. I will check autoimmune markers now and a limited abdo US. Return 2 weeks by video visit.      Since Vannesa Wilkins's last visit:  --26 weeks pregnant    Labs 3/14/23: , AST 36, ALKP 62, Tbil 0.8  ALESIA-, ASMA-, AMA-, IgG normal    Abdo US 3/14/23: [facility]      Outpatient Encounter Medications as of 3/20/2023   Medication Sig Dispense Refill    ALPRAZolam (XANAX) 0.25 MG tablet Take 1 tablet (0.25 mg total) by mouth 2 (two) times daily as needed for Anxiety. 60 tablet 0    diphenhydramine HCl (UNISOM, DIPHENHYDRAMINE, ORAL) Take by mouth.      doxylamine succinate/vit B6 (DOXYLAMINE-PYRIDOXINE, VIT B6, ORAL) Take by mouth.      labetaloL  (NORMODYNE) 100 MG tablet Take 1 tablet (100 mg total) by mouth 2 (two) times daily. (Patient taking differently: Take 50 mg by mouth 2 (two) times daily.) 60 tablet 11    prenatal vit no.124/iron/folic (PRENATAL VITAMIN ORAL) Take by mouth.       No facility-administered encounter medications on file as of 3/20/2023.     Review of patient's allergies indicates:  No Known Allergies  Past Medical History:   Diagnosis Date    Anxiety 3/6/2023    Anxiety disorder, unspecified 08/2022    Chiari malformation type I     Seizures 2007    Grand mal once  EEG  photot sensitivty        Review of Systems   Gastrointestinal:  Positive for nausea.   There were no vitals filed for this visit.    Physical Exam        Lab Results   Component Value Date    GLU 92 02/17/2023    BUN 8 02/17/2023    CREATININE 0.7 02/17/2023    CALCIUM 9.0 02/17/2023     02/17/2023    K 4.1 02/17/2023     02/17/2023    PROT 6.6 03/14/2023    CO2 23 02/17/2023    ANIONGAP 7 (L) 02/17/2023    WBC 10.82 02/17/2023    RBC 3.88 (L) 02/17/2023    HGB 12.2 02/17/2023    HCT 37.4 02/17/2023    MCV 96 02/17/2023    MCH 31.4 (H) 02/17/2023    MCHC 32.6 02/17/2023     Lab Results   Component Value Date    RDW 12.8 02/17/2023     02/17/2023    MPV 10.9 02/17/2023    GRAN 8.4 (H) 02/17/2023    GRAN 77.6 (H) 02/17/2023    LYMPH 1.6 02/17/2023    LYMPH 14.6 (L) 02/17/2023    MONO 0.5 02/17/2023    MONO 4.9 02/17/2023    EOSINOPHIL 1.8 02/17/2023    BASOPHIL 0.3 02/17/2023    EOS 0.2 02/17/2023    BASO 0.03 02/17/2023    GROUPTRH A POS 11/15/2022    CHOL 200 (H) 05/12/2020    TRIG 89 05/12/2020    HDL 64 05/12/2020    CHOLHDL 32.0 05/12/2020    TOTALCHOLEST 3.1 05/12/2020    ALBUMIN 2.8 (L) 03/14/2023    BILIDIR 0.1 03/14/2023    AST 36 03/14/2023     (H) 03/14/2023    ALKPHOS 62 03/14/2023       Assessment and Plan:  Vannesa Wilkins is a 32 y.o. female 26 weeks pregnant with fluctating liver enzymes, etiology remains unclear. BP under good  control. No proteinuria at her most recent OB appt making preeclampsia less likely. Autoimmune markers negative, making autoimmune hepatitis less likely.    Return 3 weeks    I am recommending continued monitoring with hepatic panel every 2 weeks.

## 2023-03-20 NOTE — TELEPHONE ENCOUNTER
Patient is asking about virtual appointment for BP.   Patient is on dig medicine for HTN. Currently 26 weeks pregnant and states that she has seen OB and liver doctor for liver concerns. She is wanting virtual with you just to discuss stand point of things. She states that she doesn't have a long term relationship with the other providers and feels like she has been bounced around, so wanting to just discuss issues with you for you opinion.     Ok for virtual?

## 2023-03-20 NOTE — Clinical Note
Liver tests are fluctuating. I am not sure what the etiology is. ?continued hyperemesis- she can get off antiemetics. Given controlled BP and no proteinuria- pre-eclampsia less likely? Viral hepatitis ruled out. Autoimmune markers negative.  I plan to monitor with liver tests every 2 weeks. Let me know your thoughts. NB [phone]

## 2023-03-21 ENCOUNTER — ROUTINE PRENATAL (OUTPATIENT)
Dept: OBSTETRICS AND GYNECOLOGY | Facility: CLINIC | Age: 33
End: 2023-03-21
Payer: OTHER GOVERNMENT

## 2023-03-21 ENCOUNTER — TELEPHONE (OUTPATIENT)
Dept: HEPATOLOGY | Facility: CLINIC | Age: 33
End: 2023-03-21
Payer: OTHER GOVERNMENT

## 2023-03-21 ENCOUNTER — OFFICE VISIT (OUTPATIENT)
Dept: MATERNAL FETAL MEDICINE | Facility: CLINIC | Age: 33
End: 2023-03-21
Payer: OTHER GOVERNMENT

## 2023-03-21 ENCOUNTER — PROCEDURE VISIT (OUTPATIENT)
Dept: MATERNAL FETAL MEDICINE | Facility: CLINIC | Age: 33
End: 2023-03-21
Payer: OTHER GOVERNMENT

## 2023-03-21 VITALS
SYSTOLIC BLOOD PRESSURE: 142 MMHG | WEIGHT: 223.56 LBS | DIASTOLIC BLOOD PRESSURE: 84 MMHG | HEIGHT: 64 IN | BODY MASS INDEX: 38.17 KG/M2

## 2023-03-21 VITALS — DIASTOLIC BLOOD PRESSURE: 82 MMHG | WEIGHT: 224 LBS | BODY MASS INDEX: 38.45 KG/M2 | SYSTOLIC BLOOD PRESSURE: 132 MMHG

## 2023-03-21 DIAGNOSIS — O99.340 ANXIETY DURING PREGNANCY: ICD-10-CM

## 2023-03-21 DIAGNOSIS — Z3A.26 26 WEEKS GESTATION OF PREGNANCY: Primary | ICD-10-CM

## 2023-03-21 DIAGNOSIS — O10.919 CHRONIC HYPERTENSION AFFECTING PREGNANCY: ICD-10-CM

## 2023-03-21 DIAGNOSIS — O16.2 ELEVATED BLOOD PRESSURE AFFECTING PREGNANCY IN SECOND TRIMESTER, ANTEPARTUM: ICD-10-CM

## 2023-03-21 DIAGNOSIS — F41.9 ANXIETY DURING PREGNANCY: ICD-10-CM

## 2023-03-21 DIAGNOSIS — F41.9 ANXIETY: ICD-10-CM

## 2023-03-21 DIAGNOSIS — Z36.89 ENCOUNTER FOR ULTRASOUND TO CHECK FETAL GROWTH: ICD-10-CM

## 2023-03-21 DIAGNOSIS — G93.5 CHIARI MALFORMATION TYPE I: ICD-10-CM

## 2023-03-21 DIAGNOSIS — R74.8 ELEVATED LIVER ENZYMES: ICD-10-CM

## 2023-03-21 DIAGNOSIS — Z36.89 ENCOUNTER FOR ULTRASOUND TO ASSESS FETAL GROWTH: Primary | ICD-10-CM

## 2023-03-21 PROCEDURE — 76816 OB US FOLLOW-UP PER FETUS: CPT | Mod: PBBFAC | Performed by: OBSTETRICS & GYNECOLOGY

## 2023-03-21 PROCEDURE — 99999 PR PBB SHADOW E&M-EST. PATIENT-LVL III: CPT | Mod: PBBFAC,,, | Performed by: STUDENT IN AN ORGANIZED HEALTH CARE EDUCATION/TRAINING PROGRAM

## 2023-03-21 PROCEDURE — 99999 PR PBB SHADOW E&M-EST. PATIENT-LVL III: ICD-10-PCS | Mod: PBBFAC,,, | Performed by: OBSTETRICS & GYNECOLOGY

## 2023-03-21 PROCEDURE — 99999 PR PBB SHADOW E&M-EST. PATIENT-LVL III: ICD-10-PCS | Mod: PBBFAC,,, | Performed by: STUDENT IN AN ORGANIZED HEALTH CARE EDUCATION/TRAINING PROGRAM

## 2023-03-21 PROCEDURE — 0502F PR SUBSEQUENT PRENATAL CARE: ICD-10-PCS | Mod: ,,, | Performed by: STUDENT IN AN ORGANIZED HEALTH CARE EDUCATION/TRAINING PROGRAM

## 2023-03-21 PROCEDURE — 76816 OB US FOLLOW-UP PER FETUS: CPT | Mod: 26,S$PBB,, | Performed by: OBSTETRICS & GYNECOLOGY

## 2023-03-21 PROCEDURE — 99213 OFFICE O/P EST LOW 20 MIN: CPT | Mod: PBBFAC,27 | Performed by: STUDENT IN AN ORGANIZED HEALTH CARE EDUCATION/TRAINING PROGRAM

## 2023-03-21 PROCEDURE — 99215 PR OFFICE/OUTPT VISIT, EST, LEVL V, 40-54 MIN: ICD-10-PCS | Mod: S$PBB,25,, | Performed by: OBSTETRICS & GYNECOLOGY

## 2023-03-21 PROCEDURE — 76816 PR  US,PREGNANT UTERUS,F/U,TRANSABD APP: ICD-10-PCS | Mod: 26,S$PBB,, | Performed by: OBSTETRICS & GYNECOLOGY

## 2023-03-21 PROCEDURE — 99999 PR PBB SHADOW E&M-EST. PATIENT-LVL III: CPT | Mod: PBBFAC,,, | Performed by: OBSTETRICS & GYNECOLOGY

## 2023-03-21 PROCEDURE — 99215 OFFICE O/P EST HI 40 MIN: CPT | Mod: S$PBB,25,, | Performed by: OBSTETRICS & GYNECOLOGY

## 2023-03-21 PROCEDURE — 0502F SUBSEQUENT PRENATAL CARE: CPT | Mod: ,,, | Performed by: STUDENT IN AN ORGANIZED HEALTH CARE EDUCATION/TRAINING PROGRAM

## 2023-03-21 PROCEDURE — 99213 OFFICE O/P EST LOW 20 MIN: CPT | Mod: PBBFAC | Performed by: OBSTETRICS & GYNECOLOGY

## 2023-03-21 NOTE — TELEPHONE ENCOUNTER
Attempted to contact patient to inform that virtual appointment verified with Dr. Roberson and scheduled.   NA, LVM

## 2023-03-21 NOTE — ASSESSMENT & PLAN NOTE
Patient reports her primary OB discussed with SHAI.   She is currently asymptomatic and no long term issues.  Management per primary OB provider  Would recommend an Anesthesiology consult in the third trimester given her history. (Should be ordered and arranged by the patient's primary OB provider).

## 2023-03-21 NOTE — ASSESSMENT & PLAN NOTE
Please see original consult for full counseling and recommendations   Patient is being treated as having chronic for this pregnancy.  Her last visit she was started on labetalol 100 mg twice daily.  She adjusted medications on her own to 50mg BID, which she is currently on.  She denies any symptoms related to this.  Her blood pressures in clinic today were mildly elevated.  However, upon review of her blood pressures at home on [facility], these are all low normal.   I do highly suspect there is a component of White coat hypertension or anxiety-induced hypertension.   Nonentheless, I believe it is prudent to continue managing this pregnancy as CHTN.   I do not recommend adjusting her current Labetalol dosing and she should continue on her self-adjusted 50mg BID.    Recommendations (Please refer to [facility] guidelines):   Continue aspirin 81 mg daily for preeclampsia risk reduction   Continue current medications: Labetalol 50 mg bid   In addition, because her elevated blood pressures appear to be associated with her anxiety and panic attacks, she was previously restarted on Xanax 0.25 mg p.r.n. Patient is currently taking half this dose daily.   Baseline evaluation per prior recs   Serial fetal growth ultrasounds every 4-6 weeks, beginning at 26-28 weeks.    Continued close observation of patient's blood pressures. Avoid hypotension as this has been associated with uteroplacental insufficiency.  ? Recommend treatment to a goal blood pressure < 140/90   Weekly antepartum testing at 32 weeks (NST+AFV); twice weekly testing if control is poor, multiple comorbidities are present, or requires several medications for control       Delivery timing:  Controlled on single agent, no comorbid conditions*: 38 0/7 - 38 6/7 weeks gestation  Controlled on single agent, comorbid conditions*: 37 0/7 - 38 6/7 weeks gestation  Uncontrolled or requiring ? 2 medications: 36 0/7 - 37 6/7 weeks gestation     *Comorbid  conditions include BMI >= 40, diabetes, and complex medical condition associated with placental dysfunction (ie lupus or other vascular disease)  Delivery may be recommended earlier pending results of fetal growth ultrasounds, AFV assessment, or antepartum testing results.

## 2023-03-21 NOTE — PROGRESS NOTES
"Maternal Fetal Medicine follow up consult    SUBJECTIVE:     Vannesa Wilkins is a 32 y.o.  female with IUP at 26w0d who is seen in follow up consultation by MFM.  Pregnancy complications include:   Problem   Anxiety   Elevated Blood Pressure Affecting Pregnancy in Second Trimester, Antepartum   Elevated Liver Enzymes   Chiari Malformation Type I     Previous notes reviewed.   No changes to medical, surgical, family, social, or obstetric history.    Interval history since last MFM visit:   Patient reports increasing anxiety and worry when she presents for medical care. She has been struggling with her anxiety as of late, although reports improvement in symptoms with her current regimen.  Patient denies any contractions/cramping, vaginal bleeding or leakage of fluid.  She reports good fetal movement.    Medications:  Current Outpatient Medications   Medication Instructions    ALPRAZolam (XANAX) 0.25 mg, Oral, 2 times daily PRN    diphenhydramine HCl (UNISOM, DIPHENHYDRAMINE, ORAL) Oral    doxylamine succinate/vit B6 (DOXYLAMINE-PYRIDOXINE, VIT B6, ORAL) Oral    labetaloL (NORMODYNE) 100 mg, Oral, 2 times daily    prenatal vit no.124/iron/folic (PRENATAL VITAMIN ORAL) Oral     Care team members:  Archie - Primary OB  Dr. Mcbride - Hepatology     OBJECTIVE:   BP (!) 142/84   Ht 5' 4" (1.626 m)   Wt 101.4 kg (223 lb 8.7 oz)   LMP 2022 (Exact Date)   BMI 38.37 kg/m²     Physical Exam  Deferred    Ultrasound performed. See viewpoint for full ultrasound report.  [place] live IUP  Fetal size is appropriate for gestational age, with the EFW (856 g) plotting at the 42% and the AC plotting at the 36%.   A limited repeat fetal anatomic survey appears normal.   The MVP is normal.   Fibroid is seen per above.     Significant labs/imaging:  Lab Results   Component Value Date     (H) 2023    AST 36 2023    GGT 14 2023    ALKPHOS 62 2023    BILITOT 0.8 2023     Antimitochondrial Ab " - neg  ALESIA - neg  Anti-SMA - neg  IgG - neg    Liver ultrasound 3/14 - No sonographic abnormality of the liver.    ASSESSMENT/PLAN:     32 y.o.  female with IUP at 26w0d    Elevated blood pressure affecting pregnancy in second trimester, antepartum  Please see original consult for full counseling and recommendations   Patient is being treated as having chronic for this pregnancy.  Her last visit she was started on labetalol 100 mg twice daily.  She adjusted medications on her own to 50mg BID, which she is currently on.  She denies any symptoms related to this.  Her blood pressures in clinic today were mildly elevated.  However, upon review of her blood pressures at home on Connected MOM, these are all low normal.   I do highly suspect there is a component of White coat hypertension or anxiety-induced hypertension.   Nonentheless, I believe it is prudent to continue managing this pregnancy as CHTN.   I do not recommend adjusting her current Labetalol dosing and she should continue on her self-adjusted 50mg BID.    Recommendations (Please refer to [facility] guidelines):  Continue aspirin 81 mg daily for preeclampsia risk reduction  Continue current medications: Labetalol 50 mg bid  In addition, because her elevated blood pressures appear to be associated with her anxiety and panic attacks, she was previously restarted on Xanax 0.25 mg p.r.n. Patient is currently taking half this dose daily.  Baseline evaluation per prior recs  Serial fetal growth ultrasounds every 4-6 weeks, beginning at 26-28 weeks.   Continued close observation of patient's blood pressures. Avoid hypotension as this has been associated with uteroplacental insufficiency.  Recommend treatment to a goal blood pressure < 140/90  Weekly antepartum testing at 32 weeks (NST+AFV); twice weekly testing if control is poor, multiple comorbidities are present, or requires several medications for control      Delivery timing:  Controlled on single  agent, no comorbid conditions*: 38 0/7 - 38 6/7 weeks gestation  Controlled on single agent, comorbid conditions*: 37 0/7 - 38 6/7 weeks gestation  Uncontrolled or requiring ? 2 medications: 36 0/7 - 37 6/7 weeks gestation     *Comorbid conditions include BMI >= 40, diabetes, and complex medical condition associated with placental dysfunction (ie lupus or other vascular disease)  Delivery may be recommended earlier pending results of fetal growth ultrasounds, AFV assessment, or antepartum testing results.    Elevated liver enzymes  This is being managed by Dr. Mcbride with hepatology.  Thios remains of unknown etiology, although predates this pregnancy. I do not suspect this is due to preeclampsia.  Agree with continued close monitoring as recommended by hepatology and further workup during the postpartum period.      Anxiety  Several studies have concluded the risk for relapse of psychiatric disease during pregnancy is equal to nonpregnant women, and that discontinuation, especially abrupt discontinuation of medications increases the risk of relapse during pregnancy. Risk of relapse is especially increased during the postpartum period.  When developing a treatment plan during pregnancy, careful consideration of the risk of medication exposure should be weighed against the risk of relapse and morbidity associated with discontinuation during pregnancy.     In general the risk of uncontrolled psychiatric disease outweighs the risk of medications during pregnancy, and therefore medications should be continued if medically indicated.  There is an extensive literature regarding the risks of medications, particularly SSRIs, in pregnancy.   SSRI's can be considered if patient desires a daily maintenance medications. They have also been linked with an increased risk for primary pulmonary hypertension of the , though the absolute risk of this disease is still low. Persistent pulmonary hypertension, which occurs in 2  per 1000 live births, occurs in about one per 100 newborns exposed to an SSRI in the second half of pregnancy, possibly related to serotonin-related effects on cardiovascular development.  The reports of  adaptation (or withdrawal) syndrome have not been clarified to this point.  The risks seem highest with paroxetine (Paxil), which is an SSRI with a short half-life.  Anxiolytic therapy is more controversial, as benzodiazepine administration is known to be addictive, and may cause  withdrawal syndrome. This was discussed with patient, although on the lower dose she is currently on, the risks may be lower.  She recently started with Ms. Shawen Louie at [facility]. She does not see a psychiatrist, although I did discuss this for the patient and that she should consider requesting a referral from her primary OB.      Recommendations:  Close monitoring of psychiatric symptoms and follow up throughout pregnancy.  We would happy to discuss any questions about changing her medications as they arise.  Notification of pediatrics at the time of delivery and close  follow-up pending medication patient would be on.  Breastfeeding may be at the patient's discretion and further discussion can be had in the postpartum period depending on the medications she would be on at that time.  Early postpartum visit in the office, given that patients with psychiatric disease are at increased risk for the development of postpartum depression        Chiari malformation type I  Patient reports her primary OB discussed with [facility].   She is currently asymptomatic and no long term issues.  Management per primary OB provider  Would recommend an Anesthesiology consult in the third trimester given her history. (Should be ordered and arranged by the patient's primary OB provider).        Patient was counseled that prenatal ultrasound studies have limitations. They do not detect all fetal, genetic, placental, and  maternal abnormalities. A normal appearing prenatal ultrasound is reassuring. However, it does not guarantee the absence of an abnormality or predict a normal outcome for the fetus or the mother.       FOLLOW UP:   A follow up ultrasound will be made for 4-6 weeks from today. A follow up MFM MD visit will be made for same day.       The patient was given an opportunity to ask questions about the management of her high risk pregnancy problems. She expressed an understanding of and agreement to the above impression and plan. All questions were answered to her satisfaction.    55 minutes of total time spent on the encounter, which includes face to face time and non-face to face time preparing to see the patient (eg, review of tests), obtaining and/or reviewing separately obtained history, documenting clinical information in the electronic or other health record, independently interpreting results (not separately reported) and communicating results to the patient/family/caregiver, or care coordination (not separately reported).        Uriel Turner MD   Maternal-Fetal Medicine      Electronically Signed by Uriel Turner March 21, 2023

## 2023-03-21 NOTE — ASSESSMENT & PLAN NOTE
Several studies have concluded the risk for relapse of psychiatric disease during pregnancy is equal to nonpregnant women, and that discontinuation, especially abrupt discontinuation of medications increases the risk of relapse during pregnancy. Risk of relapse is especially increased during the postpartum period.  When developing a treatment plan during pregnancy, careful consideration of the risk of medication exposure should be weighed against the risk of relapse and morbidity associated with discontinuation during pregnancy.     In general the risk of uncontrolled psychiatric disease outweighs the risk of medications during pregnancy, and therefore medications should be continued if medically indicated.  There is an extensive literature regarding the risks of medications, particularly SSRIs, in pregnancy.   SSRI's can be considered if patient desires a daily maintenance medications. They have also been linked with an increased risk for primary pulmonary hypertension of the , though the absolute risk of this disease is still low. Persistent pulmonary hypertension, which occurs in 2 per 1000 live births, occurs in about one per 100 newborns exposed to an SSRI in the second half of pregnancy, possibly related to serotonin-related effects on cardiovascular development.  The reports of  adaptation (or withdrawal) syndrome have not been clarified to this point.  The risks seem highest with paroxetine (Paxil), which is an SSRI with a short half-life.  Anxiolytic therapy is more controversial, as benzodiazepine administration is known to be addictive, and may cause  withdrawal syndrome. This was discussed with patient, although on the lower dose she is currently on, the risks may be lower.  She recently started with Ms. Geena Palma at [facility]. She does not see a psychiatrist, although I did discuss this for the patient and that she should consider requesting a referral from her  primary OB.      Recommendations:   Close monitoring of psychiatric symptoms and follow up throughout pregnancy.  We would happy to discuss any questions about changing her medications as they arise.   Notification of pediatrics at the time of delivery and close  follow-up pending medication patient would be on.   Breastfeeding may be at the patient's discretion and further discussion can be had in the postpartum period depending on the medications she would be on at that time.   Early postpartum visit in the office, given that patients with psychiatric disease are at increased risk for the development of postpartum depression

## 2023-03-21 NOTE — TELEPHONE ENCOUNTER
----- Message from Carol Mcbride MD sent at 3/20/2023  4:28 PM CDT -----  1. Labs every 2 weeks  2. Return 3 weeks

## 2023-03-21 NOTE — ASSESSMENT & PLAN NOTE
This is being managed by Dr. Mcbride with hepatology.  Thios remains of unknown etiology, although predates this pregnancy. I do not suspect this is due to preeclampsia.  Agree with continued close monitoring as recommended by hepatology and further workup during the postpartum period.

## 2023-03-28 ENCOUNTER — OFFICE VISIT (OUTPATIENT)
Dept: FAMILY MEDICINE | Facility: CLINIC | Age: 33
End: 2023-03-28
Payer: OTHER GOVERNMENT

## 2023-03-28 DIAGNOSIS — F41.9 ANXIETY: ICD-10-CM

## 2023-03-28 DIAGNOSIS — R74.8 ELEVATED LIVER ENZYMES: Primary | ICD-10-CM

## 2023-03-28 DIAGNOSIS — O16.2 ELEVATED BLOOD PRESSURE AFFECTING PREGNANCY IN SECOND TRIMESTER, ANTEPARTUM: ICD-10-CM

## 2023-03-28 PROCEDURE — 99213 OFFICE O/P EST LOW 20 MIN: CPT | Mod: 95,,, | Performed by: FAMILY MEDICINE

## 2023-03-28 PROCEDURE — 99213 PR OFFICE/OUTPT VISIT, EST, LEVL III, 20-29 MIN: ICD-10-PCS | Mod: 95,,, | Performed by: FAMILY MEDICINE

## 2023-03-28 NOTE — PROGRESS NOTES
Subjective:       Patient ID: Vannesa Wilkins is a 32 y.o. female.    Chief Complaint: No chief complaint on file.    HPI  This is a virtual visit.  Vannesa has had quite a stressful pregnancy. Since our last visit, she says her bleeding worsened significantly and she presented to the ER. She was dx'd with a subchorionic bleed which improved and resolved. The pregnancy has progressed well in spite having hyperemesis. She was dx'd with cHTN at 21 weeks and was started on labetalol. This made her feel really terribly so she has only taken half of the prescribed dose and has been monitoring her pressures. She is concerned abotu her weight gain in the pregnancy and has been getting followed by GI for some marginally elevated liver enzymes. She is tired and anxious. 27 weeks.    PMH, PSH, ALLERGIES, SH, FH reviewed  Medications reconciled      Review of Systems   Respiratory:  Negative for shortness of breath.    Cardiovascular:  Negative for chest pain and palpitations.   Musculoskeletal:  Negative for neck pain.   Neurological:  Negative for headaches.     Objective:      Physical Exam  Vitals reviewed.   Constitutional:       Appearance: Normal appearance.   Pulmonary:      Effort: No respiratory distress.   Musculoskeletal:         General: No swelling. Normal range of motion.   Neurological:      General: No focal deficit present.      Mental Status: She is alert. Mental status is at baseline.   Psychiatric:         Mood and Affect: Mood normal.      Comments: Slightly apprehensive and anxious        Assessment/Plan:       Problem List Items Addressed This Visit          Psychiatric    Anxiety       GI    Elevated liver enzymes - Primary       Obstetric    Elevated blood pressure affecting pregnancy in second trimester, antepartum     Overall this was a 'catch up' visit and some counseling was provided.  We discussed the importance of eliminating large swings in bp especially 'highs.' Even with labetalol she has had no  lows. She doesn't feel bad with it and we disucssed its potential calming benefits. I have encouraged her to increase dosing as per rec by MFM and her OB. She is taking PNV and has a script for xanax prn. However, she is doing okay overall. Her anxiety is improving as she moves into the 3rd trimester.    The patient location is: home  The chief complaint leading to consultation is: anxiety in pregnancy    Visit type: audiovisual    Face to Face time with patient:   26 minutes of total time spent on the encounter, which includes face to face time and non-face to face time preparing to see the patient (eg, review of tests), Obtaining and/or reviewing separately obtained history, Documenting clinical information in the electronic or other health record, Independently interpreting results (not separately reported) and communicating results to the patient/family/caregiver, or Care coordination (not separately reported).         Each patient to whom he or she provides medical services by telemedicine is:  (1) informed of the relationship between the physician and patient and the respective role of any other health care provider with respect to management of the patient; and (2) notified that he or she may decline to receive medical services by telemedicine and may withdraw from such care at any time.    Notes:     RTC if condition acutely worsens or any other concerns, otherwise RTC as scheduled      Answers submitted by the patient for this visit:  High Blood Pressure Questionnaire (Submitted on 3/28/2023)  Chief Complaint: Hypertension  Chronicity: chronic  Onset: 1 to 4 weeks ago  Progression since onset: gradually improving  Condition status: controlled  anxiety: Yes  blurred vision: No  chest pain: No  headaches: No  malaise/fatigue: No  neck pain: No  orthopnea: No  palpitations: No  peripheral edema: No  PND: No  shortness of breath: No  sweats: No  Agents associated with hypertension: no associated agents  CAD  risks: no known risk factors  Compliance problems: no compliance problems  Past treatments: nothing  Improvement on treatment: moderate

## 2023-03-28 NOTE — PROGRESS NOTES
Here for Santana visit. Coming up from [facility] apt and is feeling reassured after consult with Dr Turner.  Current regimen includes labetalol 50mg daily and 1/2 of her xanax 0.25 mg daily. At home she has noticed a correlation with anxiety/agitation and elevated BPs.   She is starting up with a new therapist.   From a pregnancy standpoint she denies VB, LOF, significant cramping/ctx. +FM.     /82   Wt 101.6 kg (223 lb 15.8 oz)   LMP 2022 (Exact Date)   BMI 38.45 kg/m²     32 y.o., at 26w0d by Estimated Date of Delivery: 23  Patient Active Problem List   Diagnosis    Lumbar back pain with radiculopathy affecting lower extremity    Chiari malformation type I    Elevated blood pressure affecting pregnancy in second trimester, antepartum    Elevated liver enzymes    Anxiety     OB History    Para Term  AB Living   1 0 0 0 0 0   SAB IAB Ectopic Multiple Live Births   0 0 0 0 0      # Outcome Date GA Lbr Junior/2nd Weight Sex Delivery Anes PTL Lv   1 Current                Dating reviewed    Allergies and problem list reviewed and updated    Medical and surgical history reviewed    Prenatal labs reviewed and updated    Physical Exam:  ABD: soft, gravid, nontender    Assessment:  Ob 26w0d   CHTN/white coat hypertension  Anxiety     Plan:   - we discussed SSRIs as mainstay of HARVEY treatment, she desires to see how things go with therapy and current medication regimen for now which is reasonable  - her nutritionist suggested to her that we look into iron deficiency, orders placed  - OB glucose sched  - continue current labetalol/xanax regimen     OB ED precautions. Santana f/u    Vivienne Almanza MD  Obstetrics & Gynecology   [facility]

## 2023-04-02 ENCOUNTER — PATIENT MESSAGE (OUTPATIENT)
Dept: FAMILY MEDICINE | Facility: CLINIC | Age: 33
End: 2023-04-02
Payer: OTHER GOVERNMENT

## 2023-04-04 ENCOUNTER — PATIENT MESSAGE (OUTPATIENT)
Dept: HEPATOLOGY | Facility: CLINIC | Age: 33
End: 2023-04-04
Payer: OTHER GOVERNMENT

## 2023-04-04 ENCOUNTER — PATIENT MESSAGE (OUTPATIENT)
Dept: OTHER | Facility: OTHER | Age: 33
End: 2023-04-04
Payer: OTHER GOVERNMENT

## 2023-04-04 ENCOUNTER — LAB VISIT (OUTPATIENT)
Dept: LAB | Facility: HOSPITAL | Age: 33
End: 2023-04-04
Attending: INTERNAL MEDICINE
Payer: OTHER GOVERNMENT

## 2023-04-04 DIAGNOSIS — Z3A.26 26 WEEKS GESTATION OF PREGNANCY: ICD-10-CM

## 2023-04-04 DIAGNOSIS — R79.89 ELEVATED LIVER FUNCTION TESTS: ICD-10-CM

## 2023-04-04 LAB
ALBUMIN SERPL BCP-MCNC: 2.8 G/DL (ref 3.5–5.2)
ALP SERPL-CCNC: 73 U/L (ref 55–135)
ALT SERPL W/O P-5'-P-CCNC: 111 U/L (ref 10–44)
AST SERPL-CCNC: 34 U/L (ref 10–40)
BASOPHILS # BLD AUTO: 0.03 K/UL (ref 0–0.2)
BASOPHILS NFR BLD: 0.3 % (ref 0–1.9)
BILIRUB DIRECT SERPL-MCNC: 0.1 MG/DL (ref 0.1–0.3)
BILIRUB SERPL-MCNC: 0.2 MG/DL (ref 0.1–1)
DIFFERENTIAL METHOD: ABNORMAL
EOSINOPHIL # BLD AUTO: 0.2 K/UL (ref 0–0.5)
EOSINOPHIL NFR BLD: 1.6 % (ref 0–8)
ERYTHROCYTE [DISTWIDTH] IN BLOOD BY AUTOMATED COUNT: 12.7 % (ref 11.5–14.5)
HCT VFR BLD AUTO: 36 % (ref 37–48.5)
HGB BLD-MCNC: 11.8 G/DL (ref 12–16)
IMM GRANULOCYTES # BLD AUTO: 0.12 K/UL (ref 0–0.04)
IMM GRANULOCYTES NFR BLD AUTO: 1 % (ref 0–0.5)
LYMPHOCYTES # BLD AUTO: 1.7 K/UL (ref 1–4.8)
LYMPHOCYTES NFR BLD: 14.3 % (ref 18–48)
MCH RBC QN AUTO: 30.4 PG (ref 27–31)
MCHC RBC AUTO-ENTMCNC: 32.8 G/DL (ref 32–36)
MCV RBC AUTO: 93 FL (ref 82–98)
MONOCYTES # BLD AUTO: 0.7 K/UL (ref 0.3–1)
MONOCYTES NFR BLD: 6.1 % (ref 4–15)
NEUTROPHILS # BLD AUTO: 8.9 K/UL (ref 1.8–7.7)
NEUTROPHILS NFR BLD: 76.7 % (ref 38–73)
NRBC BLD-RTO: 0 /100 WBC
PLATELET # BLD AUTO: 251 K/UL (ref 150–450)
PMV BLD AUTO: 10.7 FL (ref 9.2–12.9)
PROT SERPL-MCNC: 6.4 G/DL (ref 6–8.4)
RBC # BLD AUTO: 3.88 M/UL (ref 4–5.4)
WBC # BLD AUTO: 11.66 K/UL (ref 3.9–12.7)

## 2023-04-04 PROCEDURE — 80076 HEPATIC FUNCTION PANEL: CPT | Performed by: INTERNAL MEDICINE

## 2023-04-04 PROCEDURE — 85025 COMPLETE CBC W/AUTO DIFF WBC: CPT | Performed by: STUDENT IN AN ORGANIZED HEALTH CARE EDUCATION/TRAINING PROGRAM

## 2023-04-04 PROCEDURE — 36415 COLL VENOUS BLD VENIPUNCTURE: CPT | Performed by: INTERNAL MEDICINE

## 2023-04-05 ENCOUNTER — LAB VISIT (OUTPATIENT)
Dept: LAB | Facility: OTHER | Age: 33
End: 2023-04-05
Attending: STUDENT IN AN ORGANIZED HEALTH CARE EDUCATION/TRAINING PROGRAM
Payer: OTHER GOVERNMENT

## 2023-04-05 ENCOUNTER — ROUTINE PRENATAL (OUTPATIENT)
Dept: OBSTETRICS AND GYNECOLOGY | Facility: CLINIC | Age: 33
End: 2023-04-05
Payer: OTHER GOVERNMENT

## 2023-04-05 VITALS — BODY MASS INDEX: 38.6 KG/M2 | WEIGHT: 224.88 LBS

## 2023-04-05 DIAGNOSIS — Z3A.28 28 WEEKS GESTATION OF PREGNANCY: ICD-10-CM

## 2023-04-05 DIAGNOSIS — Z3A.26 26 WEEKS GESTATION OF PREGNANCY: ICD-10-CM

## 2023-04-05 LAB — GLUCOSE SERPL-MCNC: 93 MG/DL (ref 70–140)

## 2023-04-05 PROCEDURE — 99999 PR PBB SHADOW E&M-EST. PATIENT-LVL II: CPT | Mod: PBBFAC,,, | Performed by: STUDENT IN AN ORGANIZED HEALTH CARE EDUCATION/TRAINING PROGRAM

## 2023-04-05 PROCEDURE — 82950 GLUCOSE TEST: CPT | Performed by: STUDENT IN AN ORGANIZED HEALTH CARE EDUCATION/TRAINING PROGRAM

## 2023-04-05 PROCEDURE — 36415 COLL VENOUS BLD VENIPUNCTURE: CPT | Performed by: STUDENT IN AN ORGANIZED HEALTH CARE EDUCATION/TRAINING PROGRAM

## 2023-04-05 PROCEDURE — 0502F PR SUBSEQUENT PRENATAL CARE: ICD-10-PCS | Mod: ,,, | Performed by: STUDENT IN AN ORGANIZED HEALTH CARE EDUCATION/TRAINING PROGRAM

## 2023-04-05 PROCEDURE — 99212 OFFICE O/P EST SF 10 MIN: CPT | Mod: PBBFAC | Performed by: STUDENT IN AN ORGANIZED HEALTH CARE EDUCATION/TRAINING PROGRAM

## 2023-04-05 PROCEDURE — 0502F SUBSEQUENT PRENATAL CARE: CPT | Mod: ,,, | Performed by: STUDENT IN AN ORGANIZED HEALTH CARE EDUCATION/TRAINING PROGRAM

## 2023-04-05 PROCEDURE — 99999 PR PBB SHADOW E&M-EST. PATIENT-LVL II: ICD-10-PCS | Mod: PBBFAC,,, | Performed by: STUDENT IN AN ORGANIZED HEALTH CARE EDUCATION/TRAINING PROGRAM

## 2023-04-05 NOTE — PROGRESS NOTES
Doing ok today. Spoke with trusted PCP about BP. She is doing labetalol 100 mg qAM and 50 mg qPM which seems to be working well. No concerning or evolving preE sx. No painful ctx/cramping, no vaginal bleeding, no leaking fluid. +FM.      Wt 102 kg (224 lb 13.9 oz)   LMP 2022 (Exact Date)   BMI 38.60 kg/m²     32 y.o., at 28w1d by Estimated Date of Delivery: 23  Patient Active Problem List   Diagnosis    Lumbar back pain with radiculopathy affecting lower extremity    Chiari malformation type I    Elevated blood pressure affecting pregnancy in second trimester, antepartum    Elevated liver enzymes    Anxiety     OB History    Para Term  AB Living   1 0 0 0 0 0   SAB IAB Ectopic Multiple Live Births   0 0 0 0 0      # Outcome Date GA Lbr Junior/2nd Weight Sex Delivery Anes PTL Lv   1 Current                Dating reviewed    Allergies and problem list reviewed and updated    Medical and surgical history reviewed    Prenatal labs reviewed and updated    Physical Exam:  ABD: soft, gravid, nontender, +FHT    Assessment:  Ob 28W1D  CHTN (white coat) and/or HARVEY    Plan:   - continue current regimen, CM BPs reviewed and look great  - BP at conclusion of visit is normal (we avoid taking it right at start)  - OB glucose, CBC today  - tdap ordered  - pump rx  - undec re: BC    OB ED precautions.    F/u 32 weeks    Vivienne Almanza MD  Obstetrics & Gynecology   [facility]

## 2023-04-06 ENCOUNTER — PATIENT MESSAGE (OUTPATIENT)
Dept: HEPATOLOGY | Facility: CLINIC | Age: 33
End: 2023-04-06
Payer: OTHER GOVERNMENT

## 2023-04-07 ENCOUNTER — PATIENT MESSAGE (OUTPATIENT)
Dept: MATERNAL FETAL MEDICINE | Facility: CLINIC | Age: 33
End: 2023-04-07
Payer: OTHER GOVERNMENT

## 2023-04-12 ENCOUNTER — PATIENT MESSAGE (OUTPATIENT)
Dept: MATERNAL FETAL MEDICINE | Facility: CLINIC | Age: 33
End: 2023-04-12
Payer: OTHER GOVERNMENT

## 2023-04-13 ENCOUNTER — TELEPHONE (OUTPATIENT)
Dept: OBSTETRICS AND GYNECOLOGY | Facility: CLINIC | Age: 33
End: 2023-04-13
Payer: OTHER GOVERNMENT

## 2023-04-18 ENCOUNTER — LAB VISIT (OUTPATIENT)
Dept: LAB | Facility: OTHER | Age: 33
End: 2023-04-18
Attending: INTERNAL MEDICINE
Payer: OTHER GOVERNMENT

## 2023-04-18 ENCOUNTER — ROUTINE PRENATAL (OUTPATIENT)
Dept: OBSTETRICS AND GYNECOLOGY | Facility: CLINIC | Age: 33
End: 2023-04-18
Payer: OTHER GOVERNMENT

## 2023-04-18 ENCOUNTER — PATIENT MESSAGE (OUTPATIENT)
Dept: OTHER | Facility: OTHER | Age: 33
End: 2023-04-18
Payer: OTHER GOVERNMENT

## 2023-04-18 ENCOUNTER — PATIENT MESSAGE (OUTPATIENT)
Dept: HEPATOLOGY | Facility: CLINIC | Age: 33
End: 2023-04-18
Payer: OTHER GOVERNMENT

## 2023-04-18 VITALS
SYSTOLIC BLOOD PRESSURE: 124 MMHG | DIASTOLIC BLOOD PRESSURE: 92 MMHG | WEIGHT: 230.81 LBS | BODY MASS INDEX: 39.62 KG/M2

## 2023-04-18 DIAGNOSIS — F41.9 ANXIETY: ICD-10-CM

## 2023-04-18 DIAGNOSIS — R79.89 ELEVATED LIVER FUNCTION TESTS: ICD-10-CM

## 2023-04-18 DIAGNOSIS — G93.5 CHIARI MALFORMATION TYPE I: ICD-10-CM

## 2023-04-18 DIAGNOSIS — O10.919 CHRONIC HYPERTENSION AFFECTING PREGNANCY: ICD-10-CM

## 2023-04-18 PROBLEM — O16.2 ELEVATED BLOOD PRESSURE AFFECTING PREGNANCY IN SECOND TRIMESTER, ANTEPARTUM: Status: RESOLVED | Noted: 2023-02-17 | Resolved: 2023-04-18

## 2023-04-18 LAB
ALBUMIN SERPL BCP-MCNC: 2.8 G/DL (ref 3.5–5.2)
ALP SERPL-CCNC: 75 U/L (ref 55–135)
ALT SERPL W/O P-5'-P-CCNC: 84 U/L (ref 10–44)
AST SERPL-CCNC: 35 U/L (ref 10–40)
BILIRUB DIRECT SERPL-MCNC: 0.1 MG/DL (ref 0.1–0.3)
BILIRUB SERPL-MCNC: 0.2 MG/DL (ref 0.1–1)
PROT SERPL-MCNC: 6.6 G/DL (ref 6–8.4)

## 2023-04-18 PROCEDURE — 80076 HEPATIC FUNCTION PANEL: CPT | Performed by: INTERNAL MEDICINE

## 2023-04-18 PROCEDURE — 99999 PR PBB SHADOW E&M-EST. PATIENT-LVL III: CPT | Mod: PBBFAC,,, | Performed by: STUDENT IN AN ORGANIZED HEALTH CARE EDUCATION/TRAINING PROGRAM

## 2023-04-18 PROCEDURE — 0502F SUBSEQUENT PRENATAL CARE: CPT | Mod: ,,, | Performed by: STUDENT IN AN ORGANIZED HEALTH CARE EDUCATION/TRAINING PROGRAM

## 2023-04-18 PROCEDURE — 99213 OFFICE O/P EST LOW 20 MIN: CPT | Mod: PBBFAC | Performed by: STUDENT IN AN ORGANIZED HEALTH CARE EDUCATION/TRAINING PROGRAM

## 2023-04-18 PROCEDURE — 99999 PR PBB SHADOW E&M-EST. PATIENT-LVL III: ICD-10-PCS | Mod: PBBFAC,,, | Performed by: STUDENT IN AN ORGANIZED HEALTH CARE EDUCATION/TRAINING PROGRAM

## 2023-04-18 PROCEDURE — 36415 COLL VENOUS BLD VENIPUNCTURE: CPT | Performed by: INTERNAL MEDICINE

## 2023-04-18 PROCEDURE — 0502F PR SUBSEQUENT PRENATAL CARE: ICD-10-PCS | Mod: ,,, | Performed by: STUDENT IN AN ORGANIZED HEALTH CARE EDUCATION/TRAINING PROGRAM

## 2023-04-18 RX ORDER — FERROUS SULFATE 325(65) MG
25 TABLET, DELAYED RELEASE (ENTERIC COATED) ORAL DAILY
COMMUNITY
End: 2023-10-12

## 2023-04-19 ENCOUNTER — TELEPHONE (OUTPATIENT)
Dept: OBSTETRICS AND GYNECOLOGY | Facility: CLINIC | Age: 33
End: 2023-04-19
Payer: OTHER GOVERNMENT

## 2023-04-19 NOTE — PROGRESS NOTES
Generally doing well. Finds that since getting her regimen est on labetalol she is having less physical symptoms of anxiety, which in turn is helping/preventing anxiety symptoms from escalating. She has not had to take xanax since before our last visit (even then was taking 1/2 tablet).  No painful ctx/cramping, no vaginal bleeding, no leaking fluid. +FM.    She has intermittent mild-mod BLE edema to ankles, seemingly R slightly > L. There is no calf swelling or asymetry, no redness or pain of either extremity. Edema improves bilaterally with elevation. Wondering about red flag indicators.    Wt 104.7 kg (230 lb 13.2 oz)   LMP 2022 (Exact Date)   BMI 39.62 kg/m²     32 y.o., at 30w0d by Estimated Date of Delivery: 23  Patient Active Problem List   Diagnosis    Lumbar back pain with radiculopathy affecting lower extremity    Chiari malformation type I    Elevated blood pressure affecting pregnancy in second trimester, antepartum    Elevated liver enzymes    Anxiety     OB History    Para Term  AB Living   1 0 0 0 0 0   SAB IAB Ectopic Multiple Live Births   0 0 0 0 0      # Outcome Date GA Lbr Junior/2nd Weight Sex Delivery Anes PTL Lv   1 Current                Dating reviewed    Allergies and problem list reviewed and updated    Medical and surgical history reviewed    Prenatal labs reviewed and updated    Physical Exam:  ABD: soft, gravid, nontender, +FHT    Assessment:  OB 30w0d     Plan:   - Growth US sched 32 weeks  - PNT scheduled beginning 33 weeks (fetal surveillance to start with US growth at 32 weeks)  - planning tdap with  at [facility]  - reviewed DVT warning signs  - OB anesthesia consult for chiari I  - continue current labetalol regimen 100qAM/50qPM; can up titrate slightly if needed if persistently elevated (DBP borderline today); continue current dose given excellent control to this point and reliable home monitoring    F/u with me at 34 weeks with US growth  intervening at 32 weeks and PNT to start at 33 weeks (connected mom).   Standard precautions.    Vivienne Almanza MD  Obstetrics & Gynecology   [facility]

## 2023-04-19 NOTE — TELEPHONE ENCOUNTER
Reached out to pt in regards to scheduling. Pt vu and appt scheduled     ----- Message from Vivienne Almanza MD sent at 4/18/2023  9:29 PM CDT -----  Regarding: OB anesthesia  Please schedule OB anesthesia consult within next 4 weeks, can attempt to schedule when there is another apt at [facility] for convenience

## 2023-04-20 ENCOUNTER — PATIENT MESSAGE (OUTPATIENT)
Dept: OBSTETRICS AND GYNECOLOGY | Facility: CLINIC | Age: 33
End: 2023-04-20
Payer: OTHER GOVERNMENT

## 2023-04-20 DIAGNOSIS — R39.89 SENSATION OF PRESSURE IN BLADDER AREA: Primary | ICD-10-CM

## 2023-04-26 ENCOUNTER — PATIENT MESSAGE (OUTPATIENT)
Dept: HEPATOLOGY | Facility: CLINIC | Age: 33
End: 2023-04-26
Payer: OTHER GOVERNMENT

## 2023-05-01 ENCOUNTER — PATIENT MESSAGE (OUTPATIENT)
Dept: HEPATOLOGY | Facility: CLINIC | Age: 33
End: 2023-05-01

## 2023-05-01 ENCOUNTER — OFFICE VISIT (OUTPATIENT)
Dept: HEPATOLOGY | Facility: CLINIC | Age: 33
End: 2023-05-01
Payer: OTHER GOVERNMENT

## 2023-05-01 ENCOUNTER — PATIENT MESSAGE (OUTPATIENT)
Dept: MATERNAL FETAL MEDICINE | Facility: CLINIC | Age: 33
End: 2023-05-01
Payer: OTHER GOVERNMENT

## 2023-05-01 DIAGNOSIS — R74.8 ELEVATED LIVER ENZYMES: Primary | ICD-10-CM

## 2023-05-01 PROCEDURE — 99213 PR OFFICE/OUTPT VISIT, EST, LEVL III, 20-29 MIN: ICD-10-PCS | Mod: 95,,, | Performed by: INTERNAL MEDICINE

## 2023-05-01 PROCEDURE — 99213 OFFICE O/P EST LOW 20 MIN: CPT | Mod: 95,,, | Performed by: INTERNAL MEDICINE

## 2023-05-01 NOTE — PROGRESS NOTES
"The patient location is: home  The chief complaint leading to consultation is: elevated liver enzymes in the setting of pregnancy    Visit type: audiovisual    Face to Face time with patient: 15 minutes  30 minutes of total time spent on the encounter, which includes face to face time and non-face to face time preparing to see the patient (eg, review of tests), Obtaining and/or reviewing separately obtained history, Documenting clinical information in the electronic or other health record, Independently interpreting results (not separately reported) and communicating results to the patient/family/caregiver, or Care coordination (not separately reported).         Each patient to whom he or she provides medical services by telemedicine is:  (1) informed of the relationship between the physician and patient and the respective role of any other health care provider with respect to management of the patient; and (2) notified that he or she may decline to receive medical services by telemedicine and may withdraw from such care at any time.    Notes: HEPATOLOGY FOLLOW UP    Referring Physician:   Current Corresponding Physician: Jolie Roberson MD, Vivienne Almanza MD    Vannesa Wilkins is here for follow up of Elevated Hepatic Enzymes      HPI  Vannesa Wilkins is a 32 y.o. female with a hx of anxiety, Chiari malformation type 1 and chronic lower back pain who was 24 weeks pregnant when I saw her in consultation 3/7/23 for an evaluation of elevated liver enzymes. She was having ++N/V during pregnancy. Still on antiemetics.     Hx elevated liver enzymes:  -in college had "very high liver enzymes"; had abdo US  --5/12/20: ALT 25, AST 15, ALKP 49 (working with nutritionist-eating gluten-free and keto diet; mediterranean diet); felt healthy  --09/2021- no longer eating as well  --3/1/22: elevated liver tests in the ER  --10/5/22: ALT 80, AST 34, ALKP 62. Tbil 0.3  --11/8/22: ALT 79, AST 36, ALKP 48, Tbil <0.1  HCV Ab-, HBsAg-, " (HBcIgM-, HAV IgM- previously), HIV-  --2/17/23: , AST 44, ALKP 51, Tbil 0.2 (normal prenatal visit - labs done due to high BP); no proteinuria; still has nausea if does not take unisom  --2/24/23: ALT 72, AST 25, ALKP 57, Tbil 0.2     --no pruritus  --no RUQ  --alcohol: none  --BMI: 28 (before pregnancy)  --denies any symptoms of decompensated cirrhosis, including no ascites or edema, cognitive problems that would suggest hepatic encephalopathy, or GI bleeding from varices.     Interval History  Impression at time of consult: currently 24weeks pregnant and has elevated liver enzymes. Her elevated liver enzymes predate pregancy and thus may be unrelated to her pregnant state. Etiologies such as NAFLD or autoimmune hepatitis are on the differential. She has had (and may still have) hyperemesis gravidarum may be contributing. Preeclampsia seems unlikely since although she has htn, she has no proteinuria. Other liver diseases in pregnancy unlikely: no pruritus and no elevated ALKP making ICP unlikely; only in second trimester and enzymes coming down and no pain making acute fatty liver of pregnancy unlikely. HCV, HAV and HBV have bene ruled out by serologies. Symptomatic cholelithiasis unlikely since has no RUQ pain. I am recommended a hepatic panel every 2 weeks. I will check autoimmune markers now and a limited abdo US. Return 2 weeks by video visit.      Since Vannesa Wilkins's last visit:  --32 weeks pregnant    Labs 3/14/23: , AST 36, ALKP 62, Tbil 0.8  ALESIA-, ASMA-, AMA-, IgG normal  Labs 4/18/23: ALT 84, aST 35, ALKP 75, Tbil 0.1    Abdo US 3/14/23: normal liver; Gallbladder: 4 mm echogenic non mobile focus along the gallbladder wall demonstrating possible ring down artifact.  Findings are favored to represent adenomyomatosis versus adherent gallstone.  There is no gallbladder wall thickening or pericholecystic fluid.  No sonographic Gray's sign.    Feeling well. Nausea has improved; no abdo pain or  diarrhea.    Outpatient Encounter Medications as of 5/1/2023   Medication Sig Dispense Refill    choline bit/AA 10/LORETTA/rakk175 (CHOLINE-AA#10-LORETTA-HRB#129 ORAL) Take by mouth.      diphenhydramine HCl (UNISOM, DIPHENHYDRAMINE, ORAL) Take by mouth.      doxylamine succinate/vit B6 (DOXYLAMINE-PYRIDOXINE, VIT B6, ORAL) Take by mouth.      ferrous sulfate 325 (65 FE) MG EC tablet Take 25 mg by mouth once daily.      labetaloL (NORMODYNE) 100 MG tablet Take 1 tablet (100 mg total) by mouth 2 (two) times daily. 60 tablet 11    prenatal vit no.124/iron/folic (PRENATAL VITAMIN ORAL) Take by mouth.      ALPRAZolam (XANAX) 0.25 MG tablet Take 1 tablet (0.25 mg total) by mouth 2 (two) times daily as needed for Anxiety. 60 tablet 0     No facility-administered encounter medications on file as of 5/1/2023.     Review of patient's allergies indicates:  No Known Allergies  Past Medical History:   Diagnosis Date    Anxiety 3/6/2023    Anxiety disorder, unspecified 08/2022    Chiari malformation type I     Seizures 2007    Grand mal once  EEG  photot sensitivty        Review of Systems   Constitutional: Negative.    HENT: Negative.     Eyes: Negative.    Respiratory: Negative.     Cardiovascular: Negative.    Genitourinary: Negative.    Musculoskeletal: Negative.    Skin: Negative.    Neurological: Negative.    Psychiatric/Behavioral: Negative.     There were no vitals filed for this visit.    Physical Exam        Lab Results   Component Value Date    GLU 92 02/17/2023    BUN 8 02/17/2023    CREATININE 0.7 02/17/2023    CALCIUM 9.0 02/17/2023     02/17/2023    K 4.1 02/17/2023     02/17/2023    PROT 6.6 04/18/2023    CO2 23 02/17/2023    ANIONGAP 7 (L) 02/17/2023    WBC 11.66 04/04/2023    RBC 3.88 (L) 04/04/2023    HGB 11.8 (L) 04/04/2023    HCT 36.0 (L) 04/04/2023    MCV 93 04/04/2023    MCH 30.4 04/04/2023    MCHC 32.8 04/04/2023     Lab Results   Component Value Date    RDW 12.7 04/04/2023     04/04/2023     MPV 10.7 04/04/2023    GRAN 8.9 (H) 04/04/2023    GRAN 76.7 (H) 04/04/2023    LYMPH 1.7 04/04/2023    LYMPH 14.3 (L) 04/04/2023    MONO 0.7 04/04/2023    MONO 6.1 04/04/2023    EOSINOPHIL 1.6 04/04/2023    BASOPHIL 0.3 04/04/2023    EOS 0.2 04/04/2023    BASO 0.03 04/04/2023    GROUPTRH A POS 11/15/2022    CHOL 200 (H) 05/12/2020    TRIG 89 05/12/2020    HDL 64 05/12/2020    CHOLHDL 32.0 05/12/2020    TOTALCHOLEST 3.1 05/12/2020    ALBUMIN 2.8 (L) 04/18/2023    BILIDIR 0.1 04/18/2023    AST 35 04/18/2023    ALT 84 (H) 04/18/2023    ALKPHOS 75 04/18/2023       Assessment and Plan:  Vannesa Wilkins is a 32 y.o. female 32 weeks pregnant. She continues with fluctating liver enzymes, etiology remains unclear. BP under good control. Autoimmune markers negative, making autoimmune hepatitis less likely.      I am recommending continued monitoring with hepatic panel every 2 weeks.   Due date June 27th    After deliver, recommend complete serologic w/u, MRI w wo contrast and MRE. Will review GB in more detail on MRI to see if has polyp vs gallstone.

## 2023-05-01 NOTE — PATIENT INSTRUCTIONS
Labs every 2 weeks until June 27th (due date)  Labs in July  Appt to see me aug 2023  MRI w wo contrast and MRE before pt sees me

## 2023-05-01 NOTE — Clinical Note
1. Labs every 2 weeks until June 27th (due date) 2. Labs in July 3. Appt to see me aug 2023 4. MRI w wo contrast and MRE before pt sees me  Needs the every 2 week labs scheduled- please call her-she wants them on the days she sees her OB Then labs and MRI/MRE for me before she sees me in aug

## 2023-05-02 ENCOUNTER — PATIENT MESSAGE (OUTPATIENT)
Dept: OTHER | Facility: OTHER | Age: 33
End: 2023-05-02
Payer: OTHER GOVERNMENT

## 2023-05-02 ENCOUNTER — TELEPHONE (OUTPATIENT)
Dept: OBSTETRICS AND GYNECOLOGY | Facility: CLINIC | Age: 33
End: 2023-05-02
Payer: OTHER GOVERNMENT

## 2023-05-02 ENCOUNTER — PROCEDURE VISIT (OUTPATIENT)
Dept: MATERNAL FETAL MEDICINE | Facility: CLINIC | Age: 33
End: 2023-05-02
Payer: OTHER GOVERNMENT

## 2023-05-02 ENCOUNTER — OFFICE VISIT (OUTPATIENT)
Dept: MATERNAL FETAL MEDICINE | Facility: CLINIC | Age: 33
End: 2023-05-02
Payer: OTHER GOVERNMENT

## 2023-05-02 ENCOUNTER — LAB VISIT (OUTPATIENT)
Dept: LAB | Facility: OTHER | Age: 33
End: 2023-05-02
Attending: INTERNAL MEDICINE
Payer: OTHER GOVERNMENT

## 2023-05-02 VITALS — WEIGHT: 233.44 LBS | HEIGHT: 64 IN | BODY MASS INDEX: 39.85 KG/M2

## 2023-05-02 DIAGNOSIS — Z36.89 ENCOUNTER FOR ULTRASOUND TO ASSESS FETAL GROWTH: ICD-10-CM

## 2023-05-02 DIAGNOSIS — R74.8 ELEVATED LIVER ENZYMES: ICD-10-CM

## 2023-05-02 DIAGNOSIS — F41.9 ANXIETY: ICD-10-CM

## 2023-05-02 DIAGNOSIS — G93.5 CHIARI MALFORMATION TYPE I: ICD-10-CM

## 2023-05-02 DIAGNOSIS — O10.919 CHRONIC HYPERTENSION AFFECTING PREGNANCY: ICD-10-CM

## 2023-05-02 LAB
ALBUMIN SERPL BCP-MCNC: 2.8 G/DL (ref 3.5–5.2)
ALP SERPL-CCNC: 78 U/L (ref 55–135)
ALT SERPL W/O P-5'-P-CCNC: 90 U/L (ref 10–44)
AST SERPL-CCNC: 40 U/L (ref 10–40)
BILIRUB DIRECT SERPL-MCNC: 0.1 MG/DL (ref 0.1–0.3)
BILIRUB SERPL-MCNC: 0.2 MG/DL (ref 0.1–1)
PROT SERPL-MCNC: 6.3 G/DL (ref 6–8.4)

## 2023-05-02 PROCEDURE — 36415 COLL VENOUS BLD VENIPUNCTURE: CPT | Performed by: INTERNAL MEDICINE

## 2023-05-02 PROCEDURE — 99417 PROLNG OP E/M EACH 15 MIN: CPT | Mod: S$PBB,,, | Performed by: OBSTETRICS & GYNECOLOGY

## 2023-05-02 PROCEDURE — 99215 OFFICE O/P EST HI 40 MIN: CPT | Mod: S$PBB,25,, | Performed by: OBSTETRICS & GYNECOLOGY

## 2023-05-02 PROCEDURE — 76819 FETAL BIOPHYS PROFIL W/O NST: CPT | Mod: 26,S$PBB,, | Performed by: OBSTETRICS & GYNECOLOGY

## 2023-05-02 PROCEDURE — 99999 PR PBB SHADOW E&M-EST. PATIENT-LVL III: CPT | Mod: PBBFAC,,, | Performed by: OBSTETRICS & GYNECOLOGY

## 2023-05-02 PROCEDURE — 99215 PR OFFICE/OUTPT VISIT, EST, LEVL V, 40-54 MIN: ICD-10-PCS | Mod: S$PBB,25,, | Performed by: OBSTETRICS & GYNECOLOGY

## 2023-05-02 PROCEDURE — 99213 OFFICE O/P EST LOW 20 MIN: CPT | Mod: PBBFAC | Performed by: OBSTETRICS & GYNECOLOGY

## 2023-05-02 PROCEDURE — 99999 PR PBB SHADOW E&M-EST. PATIENT-LVL III: ICD-10-PCS | Mod: PBBFAC,,, | Performed by: OBSTETRICS & GYNECOLOGY

## 2023-05-02 PROCEDURE — 76816 PR  US,PREGNANT UTERUS,F/U,TRANSABD APP: ICD-10-PCS | Mod: 26,S$PBB,, | Performed by: OBSTETRICS & GYNECOLOGY

## 2023-05-02 PROCEDURE — 76816 OB US FOLLOW-UP PER FETUS: CPT | Mod: PBBFAC | Performed by: OBSTETRICS & GYNECOLOGY

## 2023-05-02 PROCEDURE — 80076 HEPATIC FUNCTION PANEL: CPT | Performed by: INTERNAL MEDICINE

## 2023-05-02 PROCEDURE — 76819 FETAL BIOPHYS PROFIL W/O NST: CPT | Mod: PBBFAC | Performed by: OBSTETRICS & GYNECOLOGY

## 2023-05-02 PROCEDURE — 99417 PR PROLONGED SVC, OUTPT, W/WO DIRECT PT CONTACT,  EA ADDTL 15 MIN: ICD-10-PCS | Mod: S$PBB,,, | Performed by: OBSTETRICS & GYNECOLOGY

## 2023-05-02 PROCEDURE — 76819 PR US, OB, FETAL BIOPHYSICAL, W/O NST: ICD-10-PCS | Mod: 26,S$PBB,, | Performed by: OBSTETRICS & GYNECOLOGY

## 2023-05-02 PROCEDURE — 76816 OB US FOLLOW-UP PER FETUS: CPT | Mod: 26,S$PBB,, | Performed by: OBSTETRICS & GYNECOLOGY

## 2023-05-02 NOTE — PROGRESS NOTES
"Maternal Fetal Medicine follow up consult    SUBJECTIVE:     Vannesa Wilkins is a 32 y.o.  female with IUP at 32w0d who is seen in follow up consultation by M.  Pregnancy complications include:   Problem   Chronic Hypertension Affecting Pregnancy   Anxiety   Elevated Liver Enzymes   Chiari Malformation Type I     Previous notes reviewed.   No changes to medical, surgical, family, social, or obstetric history.    Interval history since last MFM visit:   Patient feeling well overall.  Patient denies any contractions/cramping, vaginal bleeding or leakage of fluid.   She reports good fetal movement.  She does continue to report some anxiety and concern when she comes for her visits with MFM. Nonetheless, she reports improved symptoms in the past few weeks. She also reports less severe symptoms from anxiety and triggers since increasing her labetalol.   She remains concerned regarding the unknown etiology of her elevated liver enzymes.   Patient reports some increased swelling in her legs, R>L, that would come and go, worse at end of the day. Patient also reports increased carpal tunnel symptoms in her right hand.    Medications:  Current Outpatient Medications   Medication Instructions    ALPRAZolam (XANAX) 0.25 mg, Oral, 2 times daily PRN    choline bit/AA 10/LORETTA/oofu541 (CHOLINE-AA#10-LORETTA-HRB#129 ORAL) Oral    diphenhydramine HCl (UNISOM, DIPHENHYDRAMINE, ORAL) Oral    doxylamine succinate/vit B6 (DOXYLAMINE-PYRIDOXINE, VIT B6, ORAL) Oral    ferrous sulfate 25 mg, Oral, Daily    labetaloL (NORMODYNE) 100 mg, Oral, 2 times daily    prenatal vit no.124/iron/folic (PRENATAL VITAMIN ORAL) Oral       Care team members:  Archie - Primary OB     OBJECTIVE:   Ht 5' 4" (1.626 m)   Wt 105.9 kg (233 lb 7.5 oz)   LMP 2022 (Exact Date)   BMI 40.07 kg/m²   /78    Physical Exam  Deferred    Ultrasound performed. See viewpoint for full ultrasound report.  [place] live IUP  Fetal size is appropriate for " gestational age, with the EFW (1787 g) plotting at the 23% and the AC plotting at the 41%.   A limited repeat fetal anatomic survey appears normal.   The BPP score is 8/8  Fibroids per above, no interval change from prior ultrasound assessment  The MVP is normal.     Significant labs/imaging:  Lab Results   Component Value Date    ALT 90 (H) 2023    AST 40 2023    GGT 14 2023    ALKPHOS 78 2023    BILITOT 0.2 2023     ASSESSMENT/PLAN:     32 y.o.  female with IUP at 32w0d    Chronic hypertension affecting pregnancy  Please see original consult for full counseling and recommendations   Patient is being treated as having chronic HTN for this pregnancy.  She continue on Labetalol, self adjust to regimen of 100AM / 50PM. She denies PreE symptoms related to this.   Her blood pressures in clinic today were mildly elevated once again.  However, upon review of her blood pressures at home on Connected M[facility], these are all low normal.   I do highly suspect there is a component of White coat hypertension or anxiety-induced hypertension, once again.   Nonetheless, I believe it is prudent to continue managing this pregnancy as CHTN.   I do not recommend adjusting her current Labetalol dosing.    Recommendations (Please refer to [facility] guidelines):  Continue aspirin 81 mg daily for preeclampsia risk reduction  Continue current medications: Labetalol 100/50 mg  Patient no longer needing Benzo as previously prescribed.  Baseline evaluation per prior recs  Serial fetal growth ultrasounds in 4 weeks - scheduled  Continued close observation of patient's blood pressures. Avoid hypotension as this has been associated with uteroplacental insufficiency.  Recommend treatment to a goal blood pressure < 140/90  Weekly antepartum testing at 32 weeks (NST+AFV); twice weekly testing if control is poor, multiple comorbidities are present, or requires several medications for control      Delivery  timing:  Controlled on single agent, no comorbid conditions*: 38 0/7 - 38 6/7 weeks gestation  Controlled on single agent, comorbid conditions*: 37 0/7 - 38 6/7 weeks gestation  Uncontrolled or requiring ? 2 medications: 36 0/7 - 37 6/7 weeks gestation     *Comorbid conditions include BMI >= 40, diabetes, and complex medical condition associated with placental dysfunction (ie lupus or other vascular disease)  Delivery may be recommended earlier pending results of fetal growth ultrasounds, AFV assessment, or antepartum testing results.    Elevated liver enzymes  This is being managed by Dr. Mcbride with hepatology. Please refer to recent note for further updated plan.   This remains of unknown etiology, although predates this pregnancy. I do not suspect this is due to preeclampsia, AFLP or HELLP.  There is no concern for acute decompensation or worsening of her underlying problem. Her most recent lab, although slightly increased, is overall stable and has since plateaud.   Agree with continued close monitoring as recommended by hepatology (Q2 week labs) and further workup during the postpartum period.      Chiari malformation type I  Patient reports her primary OB discussed with SHAI.   She is currently asymptomatic and no long term issues.  Management per primary OB provider  Anesthesiology consult in the third trimester given her history has been ordered and scheduled.      Anxiety  Please see original consult for full counseling and recommendations   Patient reports improved symptoms and control of her anxiety since our last visit. She reports following with Ms. Geena Palma at [facility] has been very helpful. She denies use of her benzos and actually reports her Labetalol may be helping decrease the severity of her triggers. She still reports feeling anxious when coming to the hospital and undergoing testing.   We discussed the continuation of anticipatory expectations and attempts to continue to feel  comfortable in an uncomfortable, unfamiliar situation being in the hospital.   We also extensively reviewed her upcoming visits and prenatal testing. We reviewed expectations and explained testing that will be done in the coming weeks and the differences with PNT, MELIDA US and MD visits.     Recommendations:  Close monitoring of psychiatric symptoms and follow up throughout pregnancy.  We would happy to discuss any questions about changing her medications as they arise.  Breastfeeding may be at the patient's discretion and further discussion can be had in the postpartum period depending on the medications she would be on at that time.  Early postpartum visit in the office, given that patients with psychiatric disease are at increased risk for the development of postpartum depression  Continue follow up with her therapist as [facility].      - Swelling appears to be related to pregnancy. Per patient, occurs mostly after long periods of sitting and noted worse at end of day. She does report some increased swelling in R>L. Today, appears equal. No erythema noted. +1 pitting edema noted. Low concern for DVT. Signs/symptoms were reviewed. Calling precautions were reviewed.   - Recommend wrist brace to help with carpal tunnel. Consider wearing overnight to prevent waking up with pain.   - Discussed staying up to date with vaccinations. Discussed COVID and flu and the personal decision for wearing masks in public places. We discussed that although not current in a surge, the risk remains present. Recommend routine hand cleaning and avoidance of sick contacts.      Patient was counseled that prenatal ultrasound studies have limitations. They do not detect all fetal, genetic, placental, and maternal abnormalities. A normal appearing prenatal ultrasound is reassuring. However, it does not guarantee the absence of an abnormality or predict a normal outcome for the fetus or the mother.     Patient's other comorbidites  were not addressed in today's visit.  If primary OB provider would like MFM input on these, please feel free to reconsult as clinically indicated.  Otherwise, will defer management to primary OB provider      FOLLOW UP:  No further MFM MD visits have been scheduled.  A follow up ultrasound will be made for 4 weeks from today for growth/BPP.       The patient was given an opportunity to ask questions about the management of her high risk pregnancy problems. She expressed an understanding of and agreement to the above impression and plan. All questions were answered to her satisfaction.    75 minutes of total time spent on the encounter, which includes face to face time and non-face to face time preparing to see the patient (eg, review of tests), obtaining and/or reviewing separately obtained history, documenting clinical information in the electronic or other health record, independently interpreting results (not separately reported) and communicating results to the patient/family/caregiver, or care coordination (not separately reported).        Uriel Turner MD   Maternal-Fetal Medicine      Electronically Signed by Uriel Turner May 2, 2023

## 2023-05-02 NOTE — ASSESSMENT & PLAN NOTE
This is being managed by Dr. Mcbride with hepatology. Please refer to recent note for further updated plan.   This remains of unknown etiology, although predates this pregnancy. I do not suspect this is due to preeclampsia, AFLP or HELLP.  There is no concern for acute decompensation or worsening of her underlying problem. Her most recent lab, although slightly increased, is overall stable and has since plateaud.   Agree with continued close monitoring as recommended by hepatology (Q2 week labs) and further workup during the postpartum period.

## 2023-05-02 NOTE — TELEPHONE ENCOUNTER
Called pt to check in. Answered questions about PNT and variable fetal surveillance that may be indicated each visit.

## 2023-05-02 NOTE — ASSESSMENT & PLAN NOTE
Please see original consult for full counseling and recommendations   Patient is being treated as having chronic HTN for this pregnancy.  She continue on Labetalol, self adjust to regimen of 100AM / 50PM. She denies PreE symptoms related to this.   Her blood pressures in clinic today were mildly elevated once again.  However, upon review of her blood pressures at home on Connected MOM, these are all low normal.   I do highly suspect there is a component of White coat hypertension or anxiety-induced hypertension, once again.   Nonetheless, I believe it is prudent to continue managing this pregnancy as CHTN.   I do not recommend adjusting her current Labetalol dosing.    Recommendations (Please refer to [facility] guidelines):   Continue aspirin 81 mg daily for preeclampsia risk reduction   Continue current medications: Labetalol 100/50 mg   Patient no longer needing Benzo as previously prescribed.   Baseline evaluation per prior recs   Serial fetal growth ultrasounds in 4 weeks - scheduled   Continued close observation of patient's blood pressures. Avoid hypotension as this has been associated with uteroplacental insufficiency.  ? Recommend treatment to a goal blood pressure < 140/90   Weekly antepartum testing at 32 weeks (NST+AFV); twice weekly testing if control is poor, multiple comorbidities are present, or requires several medications for control       Delivery timing:  Controlled on single agent, no comorbid conditions*: 38 0/7 - 38 6/7 weeks gestation  Controlled on single agent, comorbid conditions*: 37 0/7 - 38 6/7 weeks gestation  Uncontrolled or requiring ? 2 medications: 36 0/7 - 37 6/7 weeks gestation     *Comorbid conditions include BMI >= 40, diabetes, and complex medical condition associated with placental dysfunction (ie lupus or other vascular disease)  Delivery may be recommended earlier pending results of fetal growth ultrasounds, AFV assessment, or antepartum testing results.

## 2023-05-02 NOTE — ASSESSMENT & PLAN NOTE
Patient reports her primary OB discussed with SHAI.   She is currently asymptomatic and no long term issues.  Management per primary OB provider  Anesthesiology consult in the third trimester given her history has been ordered and scheduled.

## 2023-05-02 NOTE — ASSESSMENT & PLAN NOTE
Please see original consult for full counseling and recommendations   Patient reports improved symptoms and control of her anxiety since our last visit. She reports following with Ms. Geena Louie at [facility] has been very helpful. She denies use of her benzos and actually reports her Labetalol may be helping decrease the severity of her triggers. She still reports feeling anxious when coming to the hospital and undergoing testing.   We discussed the continuation of anticipatory expectations and attempts to continue to feel comfortable in an uncomfortable, unfamiliar situation being in the hospital.   We also extensively reviewed her upcoming visits and prenatal testing. We reviewed expectations and explained testing that will be done in the coming weeks and the differences with PNT, MFM US and MD visits.     Recommendations:   Close monitoring of psychiatric symptoms and follow up throughout pregnancy.  We would happy to discuss any questions about changing her medications as they arise.   Breastfeeding may be at the patient's discretion and further discussion can be had in the postpartum period depending on the medications she would be on at that time.   Early postpartum visit in the office, given that patients with psychiatric disease are at increased risk for the development of postpartum depression   Continue follow up with her therapist as [facility].

## 2023-05-02 NOTE — Clinical Note
JUAN. Long conversation about her status and various complaints, likely pregnancy related overall. Low concern thankfully. No further visits with us. Will schedule growth. Let me know if you need anything else.  Uriel

## 2023-05-03 ENCOUNTER — TELEPHONE (OUTPATIENT)
Dept: HEPATOLOGY | Facility: CLINIC | Age: 33
End: 2023-05-03
Payer: OTHER GOVERNMENT

## 2023-05-03 NOTE — TELEPHONE ENCOUNTER
-spoke with Mrs. Granado, she asked that I schedule her and when she looked at her calendar she will see if the dates I have her scheduled for will work---- Message from Carol Mcbride MD sent at 5/1/2023 11:32 AM CDT -----  1. Labs every 2 weeks until June 27th (due date)  2. Labs in July  3. Appt to see me aug 2023  4. MRI w wo contrast and MRE before pt sees me    Needs the every 2 week labs scheduled- please call her-she wants them on the days she sees her OB  Then labs and MRI/MRE for me before she sees me in aug

## 2023-05-09 ENCOUNTER — OFFICE VISIT (OUTPATIENT)
Dept: ANESTHESIOLOGY | Facility: OTHER | Age: 33
End: 2023-05-09
Attending: STUDENT IN AN ORGANIZED HEALTH CARE EDUCATION/TRAINING PROGRAM
Payer: OTHER GOVERNMENT

## 2023-05-09 ENCOUNTER — HOSPITAL ENCOUNTER (OUTPATIENT)
Dept: PERINATAL CARE | Facility: OTHER | Age: 33
Discharge: HOME | End: 2023-05-09
Attending: STUDENT IN AN ORGANIZED HEALTH CARE EDUCATION/TRAINING PROGRAM
Payer: OTHER GOVERNMENT

## 2023-05-09 DIAGNOSIS — G93.5 CHIARI MALFORMATION TYPE I: ICD-10-CM

## 2023-05-09 DIAGNOSIS — O10.919 CHRONIC HYPERTENSION AFFECTING PREGNANCY: ICD-10-CM

## 2023-05-09 PROCEDURE — 59025 FETAL NON-STRESS TEST: CPT | Mod: 26,,, | Performed by: OBSTETRICS & GYNECOLOGY

## 2023-05-09 PROCEDURE — 76815 PRENATAL TESTING - NST/AFI: ICD-10-PCS | Mod: 26,,, | Performed by: OBSTETRICS & GYNECOLOGY

## 2023-05-09 PROCEDURE — 59025 FETAL NON-STRESS TEST: CPT

## 2023-05-09 PROCEDURE — 59025 PRENATAL TESTING - NST/AFI: ICD-10-PCS | Mod: 26,,, | Performed by: OBSTETRICS & GYNECOLOGY

## 2023-05-09 PROCEDURE — 76815 OB US LIMITED FETUS(S): CPT

## 2023-05-09 PROCEDURE — 76815 OB US LIMITED FETUS(S): CPT | Mod: 26,,, | Performed by: OBSTETRICS & GYNECOLOGY

## 2023-05-09 NOTE — CONSULTS
Consults    Outpatient OB Anesthesia Consult      Date and Time: 05/09/2023 12:11 PM     Request from: Dr. Almanza    CC requesting physician or midwife: Dr. Almanza    Reason for Consult: Anesthetic recommendations for delivery    Initial Consult?: Yes    Chief Complaint: recommendations for pain control during delivery, Chiari malformation type 1    HPI: Patient is a 32 y.o. year old woman, G 1 P 0 presenting with Chiari malformation.     Past medical history:    Past Medical History:   Diagnosis Date    Anxiety 3/6/2023    Anxiety disorder, unspecified 08/2022    Chiari malformation type I     Seizures 2007    Grand mal once  EEG  photot sensitivty        Past surgical history:    Past Surgical History:   Procedure Laterality Date    KNEE ARTHROSCOPY W/ DEBRIDEMENT Left 2004    MCL tear     Widsom Teeth Extraction Bilateral        Family history:    Family History   Problem Relation Age of Onset    Ovarian cancer Neg Hx     Colon cancer Neg Hx     Breast cancer Neg Hx        Social History:    Social History     Socioeconomic History    Marital status:    Tobacco Use    Smoking status: Never    Smokeless tobacco: Never   Substance and Sexual Activity    Alcohol use: Not Currently     Comment: pre preg    Drug use: Not Currently     Types: Methamphetamines     Comment: prescribed ritalin for dx ADHD    Sexual activity: Yes     Partners: Male     Birth control/protection: None     Social Determinants of Health     Financial Resource Strain: Low Risk     Difficulty of Paying Living Expenses: Not hard at all   Food Insecurity: No Food Insecurity    Worried About Running Out of Food in the Last Year: Never true    Ran Out of Food in the Last Year: Never true   Transportation Needs: No Transportation Needs    Lack of Transportation (Medical): No    Lack of Transportation (Non-Medical): No   Physical Activity: Sufficiently Active    Days of Exercise per Week: 5 days    Minutes of Exercise per Session: 40 min    Stress: Stress Concern Present    Feeling of Stress : To some extent   Social Connections: Unknown    Frequency of Communication with Friends and Family: Patient refused    Frequency of Social Gatherings with Friends and Family: Patient refused    Active Member of Clubs or Organizations: Patient refused    Attends Club or Organization Meetings: Patient refused    Marital Status: Patient refused   Housing Stability: Unknown    Unable to Pay for Housing in the Last Year: No    Unstable Housing in the Last Year: No       Medication:    Current Outpatient Medications on File Prior to Visit   Medication Sig Dispense Refill    ALPRAZolam (XANAX) 0.25 MG tablet Take 1 tablet (0.25 mg total) by mouth 2 (two) times daily as needed for Anxiety. 60 tablet 0    choline bit/AA 10/LORETTA/[id number] (CHOLINE-AA#10-LORETTA-HRB#129 ORAL) Take by mouth.      diphenhydramine HCl (UNISOM, DIPHENHYDRAMINE, ORAL) Take by mouth.      doxylamine succinate/vit B6 (DOXYLAMINE-PYRIDOXINE, VIT B6, ORAL) Take by mouth.      ferrous sulfate 325 (65 FE) MG EC tablet Take 25 mg by mouth once daily.      labetaloL (NORMODYNE) 100 MG tablet Take 1 tablet (100 mg total) by mouth 2 (two) times daily. 60 tablet 11    prenatal vit no.124/iron/folic (PRENATAL VITAMIN ORAL) Take by mouth.       No current facility-administered medications on file prior to visit.       Allergies:    Patient has no known allergies.    Family or personal history of anesthetic complications: Yes - PONV    Diagnostic Studies: I have reviewed the following relevant findings as noted below:  CBC:  Lab Results   Component Value Date    WBC 11.66 04/04/2023    HGB 11.8 (L) 04/04/2023    HCT 36.0 (L) 04/04/2023    MCV 93 04/04/2023     04/04/2023      CMP:  Sodium   Date Value Ref Range Status   02/17/2023 137 136 - 145 mmol/L Final     Potassium   Date Value Ref Range Status   02/17/2023 4.1 3.5 - 5.1 mmol/L Final     Chloride   Date Value Ref Range Status   02/17/2023 107 95 -  110 mmol/L Final     CO2   Date Value Ref Range Status   02/17/2023 23 23 - 29 mmol/L Final     Glucose   Date Value Ref Range Status   02/17/2023 92 70 - 110 mg/dL Final     BUN   Date Value Ref Range Status   02/17/2023 8 6 - 20 mg/dL Final     Creatinine   Date Value Ref Range Status   02/17/2023 0.7 0.5 - 1.4 mg/dL Final     Calcium   Date Value Ref Range Status   02/17/2023 9.0 8.7 - 10.5 mg/dL Final     Total Protein   Date Value Ref Range Status   05/02/2023 6.3 6.0 - 8.4 g/dL Final     Albumin   Date Value Ref Range Status   05/02/2023 2.8 (L) 3.5 - 5.2 g/dL Final     Total Bilirubin   Date Value Ref Range Status   05/02/2023 0.2 0.1 - 1.0 mg/dL Final     Comment:     For infants and newborns, interpretation of results should be based  on gestational age, weight and in agreement with clinical  observations.    Premature Infant recommended reference ranges:  Up to 24 hours.............<8.0 mg/dL  Up to 48 hours............<12.0 mg/dL  3-5 days..................<15.0 mg/dL  6-29 days.................<15.0 mg/dL       Alkaline Phosphatase   Date Value Ref Range Status   05/02/2023 78 55 - 135 U/L Final     AST   Date Value Ref Range Status   05/02/2023 40 10 - 40 U/L Final     ALT   Date Value Ref Range Status   05/02/2023 90 (H) 10 - 44 U/L Final     Anion Gap   Date Value Ref Range Status   02/17/2023 7 (L) 8 - 16 mmol/L Final     eGFR if    Date Value Ref Range Status   03/01/2022 >60 >60 mL/min/1.73 m^2 Final     eGFR if non    Date Value Ref Range Status   03/01/2022 >60 >60 mL/min/1.73 m^2 Final     Comment:     Calculation used to obtain the estimated glomerular filtration  rate (eGFR) is the CKD-EPI equation.        BMP:   Lab Results   Component Value Date     02/17/2023    K 4.1 02/17/2023     02/17/2023    CO2 23 02/17/2023    BUN 8 02/17/2023    CREATININE 0.7 02/17/2023    CALCIUM 9.0 02/17/2023    ANIONGAP 7 (L) 02/17/2023    ESTGFRAFRICA >60 03/01/2022     EGFRNONAA >60 03/01/2022     Coagulation studies: No results found for: PT, INR, APTT  CT: No results found in the last 24 hours.  MRI:     Impression:     Chiari 1 malformation.  No evidence of cord syrinx.     No other acute intracranial abnormality.     No significant degenerative changes of the spine.  No spinal canal stenosis or neural foraminal narrowing.         Review of Systems:   Constitutional: Negative for fever and chills; well appearing female  Eyes: no visual changes  Ear, nose, mouth and throat: no loose or broken teeth  Cardiovascular: no chest pain  Respiratory: no shortness of breath  Gastrointestinal: no nausea or vomiting  Genitourinary: no dysuria  Musculoskeletal: no joint pain  Skin: warm and dry, no rashes  Neurologic: no seizures  Psychiatric: no anxiety or depression  Endocrinology: no heat or cold intolerance  Hematologic: does not bruise or bleed easily      Physical Exam:  Vitals: see epic flow sheet  Constitutional: alert, no distress  Eyes: normal lids, pupils symmetric  Ear, nose, mouth and throat: Mallampati I, normal thyromental distance, normal lips, teeth, gums and tongue   Cardiovascular: normal rate, no murmurs  Respiratory: normal effort, no wheezes  Musculoskeletal: normal gait, normal range of motion  Skin: no rashes, no induration  Neurologic: normal reflexes and sensation  Psychiatric: oriented to person, place, time; normal affect    ASA Score: 3    Diagnosis: Chiari Malformation type 1, elevated liver enzymes, history of seizure     Assessment and Plan:  Patient is a 32 year old G1 who presents for anesthetic recommendations for delivery. She has a history of Chiari type 1 malformation which was incidentally found after a seizure at age 16. The patient is completely asymptomatic from the Chiari standpoint. She saw neurosurgery and had an MRI in July 2022. I reached out to the PA that she saw with neurosurgery, Sandra Bronson, to ask for recommendations regarding a  spinal/epidural. Awaiting response from her. She is planning for a vaginal delivery. If she is cleared to have a neuraxial, we will have the most experienced anesthesiologist perform her procedure.   We discussed alternative options for pain control including nitrous oxide, remifentanil PCA and general anesthesia.  She had 1 seizure at the age of 16 which was attributed to strobe lights and hyperventilation, none since. She is not on any seizure medications.   She has elevated liver enzymes, she is followed by hepatology. Unclear etiology. This will be investigated further in the post partum period.    Complexity: Moderate    Entertained and answered all questions to the patient's satisfaction.      Peggy Ybarra MD

## 2023-05-16 ENCOUNTER — HOSPITAL ENCOUNTER (OUTPATIENT)
Dept: PERINATAL CARE | Facility: OTHER | Age: 33
Discharge: HOME | End: 2023-05-16
Attending: STUDENT IN AN ORGANIZED HEALTH CARE EDUCATION/TRAINING PROGRAM
Payer: OTHER GOVERNMENT

## 2023-05-16 ENCOUNTER — ROUTINE PRENATAL (OUTPATIENT)
Dept: OBSTETRICS AND GYNECOLOGY | Facility: CLINIC | Age: 33
End: 2023-05-16
Payer: OTHER GOVERNMENT

## 2023-05-16 VITALS — WEIGHT: 239.44 LBS | BODY MASS INDEX: 41.1 KG/M2

## 2023-05-16 DIAGNOSIS — O10.919 CHRONIC HYPERTENSION AFFECTING PREGNANCY: ICD-10-CM

## 2023-05-16 DIAGNOSIS — Z3A.34 34 WEEKS GESTATION OF PREGNANCY: Primary | ICD-10-CM

## 2023-05-16 PROCEDURE — 99999 PR PBB SHADOW E&M-EST. PATIENT-LVL III: ICD-10-PCS | Mod: PBBFAC,,, | Performed by: STUDENT IN AN ORGANIZED HEALTH CARE EDUCATION/TRAINING PROGRAM

## 2023-05-16 PROCEDURE — 99213 OFFICE O/P EST LOW 20 MIN: CPT | Mod: PBBFAC,25 | Performed by: STUDENT IN AN ORGANIZED HEALTH CARE EDUCATION/TRAINING PROGRAM

## 2023-05-16 PROCEDURE — 59025 FETAL NON-STRESS TEST: CPT | Mod: 26,,, | Performed by: OBSTETRICS & GYNECOLOGY

## 2023-05-16 PROCEDURE — 76815 PRENATAL TESTING - NST/AFI: ICD-10-PCS | Mod: 26,,, | Performed by: OBSTETRICS & GYNECOLOGY

## 2023-05-16 PROCEDURE — 0502F SUBSEQUENT PRENATAL CARE: CPT | Mod: ,,, | Performed by: STUDENT IN AN ORGANIZED HEALTH CARE EDUCATION/TRAINING PROGRAM

## 2023-05-16 PROCEDURE — 59025 PRENATAL TESTING - NST/AFI: ICD-10-PCS | Mod: 26,,, | Performed by: OBSTETRICS & GYNECOLOGY

## 2023-05-16 PROCEDURE — 99999 PR PBB SHADOW E&M-EST. PATIENT-LVL III: CPT | Mod: PBBFAC,,, | Performed by: STUDENT IN AN ORGANIZED HEALTH CARE EDUCATION/TRAINING PROGRAM

## 2023-05-16 PROCEDURE — 76815 OB US LIMITED FETUS(S): CPT | Mod: 26,,, | Performed by: OBSTETRICS & GYNECOLOGY

## 2023-05-16 PROCEDURE — 59025 FETAL NON-STRESS TEST: CPT

## 2023-05-16 PROCEDURE — 76815 OB US LIMITED FETUS(S): CPT

## 2023-05-16 PROCEDURE — 0502F PR SUBSEQUENT PRENATAL CARE: ICD-10-PCS | Mod: ,,, | Performed by: STUDENT IN AN ORGANIZED HEALTH CARE EDUCATION/TRAINING PROGRAM

## 2023-05-16 RX ORDER — OMEPRAZOLE 20 MG/1
20 CAPSULE, DELAYED RELEASE ORAL DAILY
Qty: 30 CAPSULE | Refills: 0 | Status: SHIPPED | OUTPATIENT
Start: 2023-05-16 | End: 2023-06-25

## 2023-05-16 NOTE — PROGRESS NOTES
No complaints of ctx, vb, lof. +FM though degree varies day to day.  Kick counts normal. No headaches.     Wt 108.6 kg (239 lb 6.7 oz)   LMP 2022 (Exact Date)   BMI 41.10 kg/m²     32 y.o., at 34w0d by Estimated Date of Delivery: 23  Patient Active Problem List   Diagnosis    Lumbar back pain with radiculopathy affecting lower extremity    Chiari malformation type I    Elevated liver enzymes    Anxiety    Chronic hypertension affecting pregnancy     OB History    Para Term  AB Living   1 0 0 0 0 0   SAB IAB Ectopic Multiple Live Births   0 0 0 0 0      # Outcome Date GA Lbr Junior/2nd Weight Sex Delivery Anes PTL Lv   1 Current                Dating reviewed    Allergies and problem list reviewed and updated    Medical and surgical history reviewed    Prenatal labs reviewed and updated    Physical Exam:  ABD: soft, gravid, nontender, +FHT    Assessment:  OB 34w0d     Plan:   Discussed delivery timing/IOL. In setting of cHTN she understands recommendation for delivery [id number]. Has some concerns that we discussed - e.g. if cervix unfavorable for IOL at that point and risks/benefits of IOL if her BP remains well controlled on her very low dose labetalol (100mg BID) and she remains symptom-free/unchanged. We will assess week by week as we get closer to term and individualize our plan based on her status. May be reasonable to consider 39 week IOL if BP remains at goal. We agree not to push closer to 40w0d.   Plan GBS, 3T labs next visit (36 weeks)    F/u 2 weeks then weekly.    Vivienne Almanza MD  Obstetrics & Gynecology   [facility]

## 2023-05-17 ENCOUNTER — PATIENT MESSAGE (OUTPATIENT)
Dept: ANESTHESIOLOGY | Facility: HOSPITAL | Age: 33
End: 2023-05-17
Payer: OTHER GOVERNMENT

## 2023-05-22 ENCOUNTER — PATIENT MESSAGE (OUTPATIENT)
Dept: OBSTETRICS AND GYNECOLOGY | Facility: CLINIC | Age: 33
End: 2023-05-22
Payer: OTHER GOVERNMENT

## 2023-05-23 ENCOUNTER — HOSPITAL ENCOUNTER (OUTPATIENT)
Dept: PERINATAL CARE | Facility: OTHER | Age: 33
Discharge: HOME | End: 2023-05-23
Attending: STUDENT IN AN ORGANIZED HEALTH CARE EDUCATION/TRAINING PROGRAM
Payer: OTHER GOVERNMENT

## 2023-05-23 ENCOUNTER — PATIENT MESSAGE (OUTPATIENT)
Dept: OTHER | Facility: OTHER | Age: 33
End: 2023-05-23
Payer: OTHER GOVERNMENT

## 2023-05-23 DIAGNOSIS — O10.919 CHRONIC HYPERTENSION AFFECTING PREGNANCY: ICD-10-CM

## 2023-05-23 PROCEDURE — 76815 OB US LIMITED FETUS(S): CPT

## 2023-05-23 PROCEDURE — 59025 FETAL NON-STRESS TEST: CPT

## 2023-05-23 PROCEDURE — 76815 OB US LIMITED FETUS(S): CPT | Mod: 26,,, | Performed by: OBSTETRICS & GYNECOLOGY

## 2023-05-23 PROCEDURE — 76815 PRENATAL TESTING - NST/AFI: ICD-10-PCS | Mod: 26,,, | Performed by: OBSTETRICS & GYNECOLOGY

## 2023-05-23 PROCEDURE — 59025 FETAL NON-STRESS TEST: CPT | Mod: 26,,, | Performed by: OBSTETRICS & GYNECOLOGY

## 2023-05-23 PROCEDURE — 59025 PRENATAL TESTING - NST/AFI: ICD-10-PCS | Mod: 26,,, | Performed by: OBSTETRICS & GYNECOLOGY

## 2023-05-24 ENCOUNTER — PATIENT MESSAGE (OUTPATIENT)
Dept: OBSTETRICS AND GYNECOLOGY | Facility: CLINIC | Age: 33
End: 2023-05-24
Payer: OTHER GOVERNMENT

## 2023-05-30 ENCOUNTER — ROUTINE PRENATAL (OUTPATIENT)
Dept: OBSTETRICS AND GYNECOLOGY | Facility: CLINIC | Age: 33
End: 2023-05-30
Payer: OTHER GOVERNMENT

## 2023-05-30 ENCOUNTER — PROCEDURE VISIT (OUTPATIENT)
Dept: MATERNAL FETAL MEDICINE | Facility: CLINIC | Age: 33
End: 2023-05-30
Payer: OTHER GOVERNMENT

## 2023-05-30 ENCOUNTER — PATIENT MESSAGE (OUTPATIENT)
Dept: OBSTETRICS AND GYNECOLOGY | Facility: CLINIC | Age: 33
End: 2023-05-30

## 2023-05-30 VITALS
SYSTOLIC BLOOD PRESSURE: 129 MMHG | DIASTOLIC BLOOD PRESSURE: 90 MMHG | BODY MASS INDEX: 42.38 KG/M2 | WEIGHT: 246.94 LBS

## 2023-05-30 DIAGNOSIS — O10.919 CHRONIC HYPERTENSION AFFECTING PREGNANCY: ICD-10-CM

## 2023-05-30 DIAGNOSIS — Z3A.36 36 WEEKS GESTATION OF PREGNANCY: Primary | ICD-10-CM

## 2023-05-30 DIAGNOSIS — O10.919 CHRONIC HYPERTENSION AFFECTING PREGNANCY: Primary | ICD-10-CM

## 2023-05-30 PROCEDURE — 76819 FETAL BIOPHYS PROFIL W/O NST: CPT | Mod: 26,S$PBB,, | Performed by: OBSTETRICS & GYNECOLOGY

## 2023-05-30 PROCEDURE — 0502F PR SUBSEQUENT PRENATAL CARE: ICD-10-PCS | Mod: ,,, | Performed by: STUDENT IN AN ORGANIZED HEALTH CARE EDUCATION/TRAINING PROGRAM

## 2023-05-30 PROCEDURE — 76819 US MFM PROCEDURE (VIEWPOINT): ICD-10-PCS | Mod: 26,S$PBB,, | Performed by: OBSTETRICS & GYNECOLOGY

## 2023-05-30 PROCEDURE — 0502F SUBSEQUENT PRENATAL CARE: CPT | Mod: ,,, | Performed by: STUDENT IN AN ORGANIZED HEALTH CARE EDUCATION/TRAINING PROGRAM

## 2023-05-30 PROCEDURE — 99999 PR PBB SHADOW E&M-EST. PATIENT-LVL II: ICD-10-PCS | Mod: PBBFAC,,, | Performed by: STUDENT IN AN ORGANIZED HEALTH CARE EDUCATION/TRAINING PROGRAM

## 2023-05-30 PROCEDURE — 99999 PR PBB SHADOW E&M-EST. PATIENT-LVL II: CPT | Mod: PBBFAC,,, | Performed by: STUDENT IN AN ORGANIZED HEALTH CARE EDUCATION/TRAINING PROGRAM

## 2023-05-30 PROCEDURE — 76816 US MFM PROCEDURE (VIEWPOINT): ICD-10-PCS | Mod: 26,S$PBB,, | Performed by: OBSTETRICS & GYNECOLOGY

## 2023-05-30 PROCEDURE — 76816 OB US FOLLOW-UP PER FETUS: CPT | Mod: PBBFAC | Performed by: OBSTETRICS & GYNECOLOGY

## 2023-05-30 PROCEDURE — 99212 OFFICE O/P EST SF 10 MIN: CPT | Mod: PBBFAC,25 | Performed by: STUDENT IN AN ORGANIZED HEALTH CARE EDUCATION/TRAINING PROGRAM

## 2023-05-30 PROCEDURE — 87081 CULTURE SCREEN ONLY: CPT | Performed by: STUDENT IN AN ORGANIZED HEALTH CARE EDUCATION/TRAINING PROGRAM

## 2023-06-01 LAB — BACTERIA SPEC AEROBE CULT: NORMAL

## 2023-06-06 ENCOUNTER — ROUTINE PRENATAL (OUTPATIENT)
Dept: OBSTETRICS AND GYNECOLOGY | Facility: CLINIC | Age: 33
End: 2023-06-06
Payer: OTHER GOVERNMENT

## 2023-06-06 ENCOUNTER — HOSPITAL ENCOUNTER (OUTPATIENT)
Dept: PERINATAL CARE | Facility: OTHER | Age: 33
Discharge: HOME | End: 2023-06-06
Attending: STUDENT IN AN ORGANIZED HEALTH CARE EDUCATION/TRAINING PROGRAM
Payer: OTHER GOVERNMENT

## 2023-06-06 VITALS
DIASTOLIC BLOOD PRESSURE: 92 MMHG | WEIGHT: 249.81 LBS | SYSTOLIC BLOOD PRESSURE: 142 MMHG | BODY MASS INDEX: 42.87 KG/M2

## 2023-06-06 DIAGNOSIS — Z3A.37 37 WEEKS GESTATION OF PREGNANCY: Primary | ICD-10-CM

## 2023-06-06 DIAGNOSIS — O10.919 CHRONIC HYPERTENSION AFFECTING PREGNANCY: ICD-10-CM

## 2023-06-06 PROCEDURE — 99999 PR PBB SHADOW E&M-EST. PATIENT-LVL III: CPT | Mod: PBBFAC,,, | Performed by: STUDENT IN AN ORGANIZED HEALTH CARE EDUCATION/TRAINING PROGRAM

## 2023-06-06 PROCEDURE — 0502F PR SUBSEQUENT PRENATAL CARE: ICD-10-PCS | Mod: ,,, | Performed by: STUDENT IN AN ORGANIZED HEALTH CARE EDUCATION/TRAINING PROGRAM

## 2023-06-06 PROCEDURE — 59025 FETAL NON-STRESS TEST: CPT

## 2023-06-06 PROCEDURE — 99999 PR PBB SHADOW E&M-EST. PATIENT-LVL III: ICD-10-PCS | Mod: PBBFAC,,, | Performed by: STUDENT IN AN ORGANIZED HEALTH CARE EDUCATION/TRAINING PROGRAM

## 2023-06-06 PROCEDURE — 76815 OB US LIMITED FETUS(S): CPT | Mod: 26,,, | Performed by: OBSTETRICS & GYNECOLOGY

## 2023-06-06 PROCEDURE — 99213 OFFICE O/P EST LOW 20 MIN: CPT | Mod: PBBFAC | Performed by: STUDENT IN AN ORGANIZED HEALTH CARE EDUCATION/TRAINING PROGRAM

## 2023-06-06 PROCEDURE — 59025 PRENATAL TESTING - NST/AFI: ICD-10-PCS | Mod: 26,,, | Performed by: OBSTETRICS & GYNECOLOGY

## 2023-06-06 PROCEDURE — 76815 PRENATAL TESTING - NST/AFI: ICD-10-PCS | Mod: 26,,, | Performed by: OBSTETRICS & GYNECOLOGY

## 2023-06-06 PROCEDURE — 0502F SUBSEQUENT PRENATAL CARE: CPT | Mod: ,,, | Performed by: STUDENT IN AN ORGANIZED HEALTH CARE EDUCATION/TRAINING PROGRAM

## 2023-06-06 PROCEDURE — 59025 FETAL NON-STRESS TEST: CPT | Mod: 26,,, | Performed by: OBSTETRICS & GYNECOLOGY

## 2023-06-06 PROCEDURE — 76815 OB US LIMITED FETUS(S): CPT

## 2023-06-06 NOTE — PROGRESS NOTES
Doing well. No painful ctx/cramping, no vaginal bleeding, no leaking fluid. +FM.      Wt 113.3 kg (249 lb 12.5 oz)   LMP 2022 (Exact Date)   BMI 42.87 kg/m²     32 y.o., at 37w0d by Estimated Date of Delivery: 23  Patient Active Problem List   Diagnosis    Lumbar back pain with radiculopathy affecting lower extremity    Chiari malformation type I    Elevated liver enzymes    Anxiety    Chronic hypertension affecting pregnancy     OB History    Para Term  AB Living   1 0 0 0 0 0   SAB IAB Ectopic Multiple Live Births   0 0 0 0 0      # Outcome Date GA Lbr Junior/2nd Weight Sex Delivery Anes PTL Lv   1 Current                Dating reviewed    Allergies and problem list reviewed and updated    Medical and surgical history reviewed    Prenatal labs reviewed and updated    Physical Exam:  ABD: soft, gravid, nontender    Assessment:  OB 37w0d     Plan:   Birth preference/pushing preferences discussed see sticky note    OB ED precautions. F/u weekly     Vivienne Almanza MD  Obstetrics & Gynecology   [facility]

## 2023-06-07 NOTE — PROGRESS NOTES
Doing well. No painful ctx/cramping, no vaginal bleeding, no leaking fluid. +FM.    BPs stable at home and with PNT. No HAs    BP (!) 129/90   Wt 112 kg (246 lb 14.6 oz)   LMP 2022 (Exact Date)   BMI 42.38 kg/m²     32 y.o., at 37w1d by Estimated Date of Delivery: 23  Patient Active Problem List   Diagnosis    Lumbar back pain with radiculopathy affecting lower extremity    Chiari malformation type I    Elevated liver enzymes    Anxiety    Chronic hypertension affecting pregnancy     OB History    Para Term  AB Living   1 0 0 0 0 0   SAB IAB Ectopic Multiple Live Births   0 0 0 0 0      # Outcome Date GA Lbr Junior/2nd Weight Sex Delivery Anes PTL Lv   1 Current                Dating reviewed    Allergies and problem list reviewed and updated    Medical and surgical history reviewed    Prenatal labs reviewed and updated    Physical Exam:  ABD: soft, gravid, nontender    Assessment:  OB 36w0d    Plan:   GBS, 3T labs  Consents reviewed in detail and signed    F/u 1 week. PTL, preE precautions     Vivienne Almanza MD  Obstetrics & Gynecology   [facility]

## 2023-06-13 ENCOUNTER — ROUTINE PRENATAL (OUTPATIENT)
Dept: OBSTETRICS AND GYNECOLOGY | Facility: CLINIC | Age: 33
End: 2023-06-13
Payer: OTHER GOVERNMENT

## 2023-06-13 ENCOUNTER — HOSPITAL ENCOUNTER (OUTPATIENT)
Dept: PERINATAL CARE | Facility: OTHER | Age: 33
Discharge: HOME | End: 2023-06-13
Attending: STUDENT IN AN ORGANIZED HEALTH CARE EDUCATION/TRAINING PROGRAM
Payer: OTHER GOVERNMENT

## 2023-06-13 VITALS — DIASTOLIC BLOOD PRESSURE: 90 MMHG | SYSTOLIC BLOOD PRESSURE: 154 MMHG

## 2023-06-13 DIAGNOSIS — O10.919 CHRONIC HYPERTENSION AFFECTING PREGNANCY: ICD-10-CM

## 2023-06-13 DIAGNOSIS — Z3A.38 38 WEEKS GESTATION OF PREGNANCY: ICD-10-CM

## 2023-06-13 DIAGNOSIS — O10.919 CHRONIC HYPERTENSION AFFECTING PREGNANCY: Primary | ICD-10-CM

## 2023-06-13 LAB
CREAT UR-MCNC: 35 MG/DL (ref 15–325)
PROT UR-MCNC: <7 MG/DL (ref 0–15)
PROT/CREAT UR: NORMAL MG/G{CREAT} (ref 0–0.2)

## 2023-06-13 PROCEDURE — 59025 PRENATAL TESTING - NST/AFI: ICD-10-PCS | Mod: 26,,, | Performed by: OBSTETRICS & GYNECOLOGY

## 2023-06-13 PROCEDURE — 0502F PR SUBSEQUENT PRENATAL CARE: ICD-10-PCS | Mod: ,,, | Performed by: STUDENT IN AN ORGANIZED HEALTH CARE EDUCATION/TRAINING PROGRAM

## 2023-06-13 PROCEDURE — 0502F SUBSEQUENT PRENATAL CARE: CPT | Mod: ,,, | Performed by: STUDENT IN AN ORGANIZED HEALTH CARE EDUCATION/TRAINING PROGRAM

## 2023-06-13 PROCEDURE — 76815 OB US LIMITED FETUS(S): CPT

## 2023-06-13 PROCEDURE — 99999 PR PBB SHADOW E&M-EST. PATIENT-LVL II: CPT | Mod: PBBFAC,,, | Performed by: STUDENT IN AN ORGANIZED HEALTH CARE EDUCATION/TRAINING PROGRAM

## 2023-06-13 PROCEDURE — 76815 OB US LIMITED FETUS(S): CPT | Mod: 26,,, | Performed by: OBSTETRICS & GYNECOLOGY

## 2023-06-13 PROCEDURE — 99212 OFFICE O/P EST SF 10 MIN: CPT | Mod: PBBFAC,25 | Performed by: STUDENT IN AN ORGANIZED HEALTH CARE EDUCATION/TRAINING PROGRAM

## 2023-06-13 PROCEDURE — 76815 PRENATAL TESTING - NST/AFI: ICD-10-PCS | Mod: 26,,, | Performed by: OBSTETRICS & GYNECOLOGY

## 2023-06-13 PROCEDURE — 59025 FETAL NON-STRESS TEST: CPT

## 2023-06-13 PROCEDURE — 59025 FETAL NON-STRESS TEST: CPT | Mod: 26,,, | Performed by: OBSTETRICS & GYNECOLOGY

## 2023-06-13 PROCEDURE — 82570 ASSAY OF URINE CREATININE: CPT | Performed by: STUDENT IN AN ORGANIZED HEALTH CARE EDUCATION/TRAINING PROGRAM

## 2023-06-13 PROCEDURE — 99999 PR PBB SHADOW E&M-EST. PATIENT-LVL II: ICD-10-PCS | Mod: PBBFAC,,, | Performed by: STUDENT IN AN ORGANIZED HEALTH CARE EDUCATION/TRAINING PROGRAM

## 2023-06-13 RX ORDER — SIMETHICONE 80 MG
1 TABLET,CHEWABLE ORAL 4 TIMES DAILY PRN
Status: CANCELLED | OUTPATIENT
Start: 2023-06-13

## 2023-06-13 RX ORDER — MISOPROSTOL 100 MCG
50 TABLET ORAL EVERY 4 HOURS PRN
Status: CANCELLED | OUTPATIENT
Start: 2023-06-13

## 2023-06-13 RX ORDER — LIDOCAINE HYDROCHLORIDE 10 MG/ML
10 INJECTION INFILTRATION; PERINEURAL ONCE AS NEEDED
Status: CANCELLED | OUTPATIENT
Start: 2023-06-13 | End: 2034-11-09

## 2023-06-13 RX ORDER — MISOPROSTOL 100 MCG
25 TABLET ORAL EVERY 4 HOURS PRN
Status: CANCELLED | OUTPATIENT
Start: 2023-06-13

## 2023-06-13 RX ORDER — MISOPROSTOL 100 UG/1
800 TABLET ORAL ONCE AS NEEDED
Status: CANCELLED | OUTPATIENT
Start: 2023-06-13

## 2023-06-13 RX ORDER — OXYTOCIN/RINGER'S LACTATE 30/500 ML
95 PLASTIC BAG, INJECTION (ML) INTRAVENOUS ONCE AS NEEDED
Status: CANCELLED | OUTPATIENT
Start: 2023-06-13 | End: 2034-11-09

## 2023-06-13 RX ORDER — MISOPROSTOL 100 UG/1
800 TABLET ORAL
Status: CANCELLED | OUTPATIENT
Start: 2023-06-13

## 2023-06-13 RX ORDER — OXYTOCIN 10 [USP'U]/ML
10 INJECTION, SOLUTION INTRAMUSCULAR; INTRAVENOUS ONCE AS NEEDED
Status: CANCELLED | OUTPATIENT
Start: 2023-06-13 | End: 2034-11-09

## 2023-06-13 RX ORDER — TERBUTALINE SULFATE 1 MG/ML
0.25 INJECTION SUBCUTANEOUS
Status: CANCELLED | OUTPATIENT
Start: 2023-06-13

## 2023-06-13 RX ORDER — CARBOPROST TROMETHAMINE 250 UG/ML
250 INJECTION, SOLUTION INTRAMUSCULAR
Status: CANCELLED | OUTPATIENT
Start: 2023-06-13

## 2023-06-13 RX ORDER — METHYLERGONOVINE MALEATE 0.2 MG/ML
200 INJECTION INTRAVENOUS
Status: CANCELLED | OUTPATIENT
Start: 2023-06-13

## 2023-06-13 RX ORDER — OXYTOCIN/RINGER'S LACTATE 30/500 ML
95 PLASTIC BAG, INJECTION (ML) INTRAVENOUS ONCE
Status: CANCELLED | OUTPATIENT
Start: 2023-06-13 | End: 2023-06-13

## 2023-06-13 RX ORDER — CALCIUM CARBONATE 200(500)MG
500 TABLET,CHEWABLE ORAL 3 TIMES DAILY PRN
Status: CANCELLED | OUTPATIENT
Start: 2023-06-13

## 2023-06-13 RX ORDER — OXYTOCIN/RINGER'S LACTATE 30/500 ML
334 PLASTIC BAG, INJECTION (ML) INTRAVENOUS ONCE
Status: CANCELLED | OUTPATIENT
Start: 2023-06-13 | End: 2023-06-13

## 2023-06-13 RX ORDER — OXYTOCIN/RINGER'S LACTATE 30/500 ML
0-30 PLASTIC BAG, INJECTION (ML) INTRAVENOUS CONTINUOUS
Status: CANCELLED | OUTPATIENT
Start: 2023-06-13

## 2023-06-13 RX ORDER — ACETAMINOPHEN 325 MG/1
650 TABLET ORAL EVERY 6 HOURS PRN
Status: CANCELLED | OUTPATIENT
Start: 2023-06-13

## 2023-06-13 RX ORDER — OXYTOCIN/RINGER'S LACTATE 30/500 ML
334 PLASTIC BAG, INJECTION (ML) INTRAVENOUS ONCE AS NEEDED
Status: CANCELLED | OUTPATIENT
Start: 2023-06-13 | End: 2034-11-09

## 2023-06-13 RX ORDER — MUPIROCIN 20 MG/G
OINTMENT TOPICAL
Status: CANCELLED | OUTPATIENT
Start: 2023-06-13

## 2023-06-13 RX ORDER — MISOPROSTOL 100 UG/1
800 TABLET ORAL ONCE AS NEEDED
Status: CANCELLED | OUTPATIENT
Start: 2023-06-13 | End: 2034-11-09

## 2023-06-13 RX ORDER — PROCHLORPERAZINE EDISYLATE 5 MG/ML
5 INJECTION INTRAMUSCULAR; INTRAVENOUS EVERY 6 HOURS PRN
Status: CANCELLED | OUTPATIENT
Start: 2023-06-13

## 2023-06-13 RX ORDER — ONDANSETRON 4 MG/1
8 TABLET, ORALLY DISINTEGRATING ORAL EVERY 8 HOURS PRN
Status: CANCELLED | OUTPATIENT
Start: 2023-06-13

## 2023-06-13 RX ORDER — DIPHENOXYLATE HYDROCHLORIDE AND ATROPINE SULFATE 2.5; .025 MG/1; MG/1
1 TABLET ORAL 4 TIMES DAILY PRN
Status: CANCELLED | OUTPATIENT
Start: 2023-06-13

## 2023-06-13 RX ORDER — SODIUM CHLORIDE 9 MG/ML
INJECTION, SOLUTION INTRAVENOUS
Status: CANCELLED | OUTPATIENT
Start: 2023-06-13

## 2023-06-13 RX ORDER — SODIUM CHLORIDE, SODIUM LACTATE, POTASSIUM CHLORIDE, CALCIUM CHLORIDE 600; 310; 30; 20 MG/100ML; MG/100ML; MG/100ML; MG/100ML
INJECTION, SOLUTION INTRAVENOUS CONTINUOUS
Status: CANCELLED | OUTPATIENT
Start: 2023-06-13

## 2023-06-13 NOTE — H&P
HISTORY AND PHYSICAL                                                OBSTETRICS          Subjective:       Vannesa Wilkins is a 32 y.o.  female with IUP at 39w0d weeks gestation who presents with scheduled medical IOL.    Patient denies contractions, denies vaginal bleeding, denies LOF.   Fetal Movement: normal.   No HA, vision changes, RUQ/epigastric pain, no CP/SOB.    There is a maternal diagnosis of cHTN with underlying component of HARVEY managed with labetalol 100 mg BID.  There is a maternal history of chronic transaminitis est with hepatology, chair 1 (asymptomatic) est with NSG and s/p OB anesthesia consult, and uterine fibroids.  History of maternal seizure x 1 remote, no medications.     Review of Systems   Constitutional:  Negative for chills and fever.   Eyes:  Negative for visual disturbance.   Respiratory:  Negative for cough, shortness of breath and wheezing.    Cardiovascular:  Negative for chest pain, palpitations and leg swelling.   Gastrointestinal:  Negative for abdominal pain, nausea and vomiting.   Endocrine: Negative for diabetes, hyperthyroidism and hypothyroidism.   Genitourinary:  Negative for dysuria, pelvic pain, vaginal bleeding and vaginal pain.   Musculoskeletal:  Positive for back pain. Negative for joint swelling and myalgias.   Integumentary:  Negative for rash.   Neurological:  Negative for vertigo, seizures, syncope, numbness and headaches.   Hematological:  Does not bruise/bleed easily.   Psychiatric/Behavioral:  Negative for depression. The patient is nervous/anxious.      PMHx:   Past Medical History:   Diagnosis Date    Anxiety 3/6/2023    Anxiety disorder, unspecified 2022    Chiari malformation type I     Seizures 2007    Grand mal once  EEG  photot sensitivty        PSHx:   Past Surgical History:   Procedure Laterality Date    KNEE ARTHROSCOPY W/ DEBRIDEMENT Left     MCL tear     Widsom Teeth Extraction Bilateral        All: Review of patient's allergies  indicates:  No Known Allergies    Meds: (Not in a hospital admission)      SH:   Social History     Socioeconomic History    Marital status:    Tobacco Use    Smoking status: Never    Smokeless tobacco: Never   Substance and Sexual Activity    Alcohol use: Not Currently     Comment: pre preg    Drug use: Not Currently     Types: Methamphetamines     Comment: prescribed ritalin for dx ADHD    Sexual activity: Yes     Partners: Male     Birth control/protection: None     Social Determinants of Health     Financial Resource Strain: Low Risk     Difficulty of Paying Living Expenses: Not hard at all   Food Insecurity: No Food Insecurity    Worried About Running Out of Food in the Last Year: Never true    Ran Out of Food in the Last Year: Never true   Transportation Needs: No Transportation Needs    Lack of Transportation (Medical): No    Lack of Transportation (Non-Medical): No   Physical Activity: Sufficiently Active    Days of Exercise per Week: 5 days    Minutes of Exercise per Session: 40 min   Stress: Stress Concern Present    Feeling of Stress : To some extent   Social Connections: Unknown    Frequency of Communication with Friends and Family: Patient refused    Frequency of Social Gatherings with Friends and Family: Patient refused    Active Member of Clubs or Organizations: Patient refused    Attends Club or Organization Meetings: Patient refused    Marital Status: Patient refused   Housing Stability: Unknown    Unable to Pay for Housing in the Last Year: No    Unstable Housing in the Last Year: No       FH:   Family History   Problem Relation Age of Onset    Ovarian cancer Neg Hx     Colon cancer Neg Hx     Breast cancer Neg Hx        OBHx:   OB History    Para Term  AB Living   1 0 0 0 0 0   SAB IAB Ectopic Multiple Live Births   0 0 0 0 0      # Outcome Date GA Lbr Junior/2nd Weight Sex Delivery Anes PTL Lv   1 Current                Objective:       BP (!) 154/90   LMP 2022 (Exact  Date)     Vitals:    06/13/23 1103   BP: (!) 154/90     General:   alert, appears stated age and cooperative, no apparent distress   HENT:  normocephalic, atraumatic   Eyes:  extraocular movements and conjunctivae normal   Neck:  supple, range of motion normal, no thyromegaly   Lungs:   no respiratory distress   Heart:   regular rate   Abdomen:  soft, non-tender, non-distended but gravid, no rebound or guarding    Extremities Symmetric +1 BLE edema, negative erythema   FHT: Deferred to admission                 TOCO: Deferred to admission   Presentations: Deferred to admission   Cervix: Fingertip/60/-4, soft posterior     EFW by Leopold's: 7 lb    32 wk Growth Scan: Fetal size is AGA with the EFW plotting at the 14% and the AC plotting at the 22%.   The EFW is 2452 g.     Lab Review  Blood Type A POS  GBBS: negative  Rubella: Immune  RPR: NR  HIV: negative  HepB: negative       Assessment/Plan:     1. Medical IOL  - Consents signed in clinic  - Epidural per Anesthesia  - Draw CBC, T&S  - NST, US position, cervical exam on arrival  - IOL method per cervical exam - plan outpatient bello  - Maternal pelvis adequate on exam, monitor labor curve    2. cHTN  - continue home labetalol 100 mg BID  - monitor for signs/sx superimposed preE    3. HARVEY   - est with therapist  - close outpatient f/u with primary OB    4. Transaminitis  - has outpt f/u with hepatology    Postpartum hemorrhage risk: low    Vivienne Almanza MD  Obstetrics & Gynecology   [facility]

## 2023-06-13 NOTE — PROGRESS NOTES
Occasional ctx still irregular, no vaginal bleeding, no leaking fluid. +FM.    Had mild HA when working out outside, resolved. Currently No HA, vision changes, RUQ/epigastric pain, no CP/SOB.  Feeling extremely anxious as we approach delivery - on arrival /90 though was very anxious at this point. Repeats were improved.     BP (!) 154/90   LMP 2022 (Exact Date)     32 y.o., at 38w0d by Estimated Date of Delivery: 23  Patient Active Problem List   Diagnosis    Lumbar back pain with radiculopathy affecting lower extremity    Chiari malformation type I    Elevated liver enzymes    Anxiety    Chronic hypertension affecting pregnancy     OB History    Para Term  AB Living   1 0 0 0 0 0   SAB IAB Ectopic Multiple Live Births   0 0 0 0 0      # Outcome Date GA Lbr Junior/2nd Weight Sex Delivery Anes PTL Lv   1 Current                Dating reviewed    Allergies and problem list reviewed and updated    Medical and surgical history reviewed    Prenatal labs reviewed and updated    Physical Exam:  ABD: soft, gravid, nontender  Cervix fingertip/60/-4    Assessment:  OB 38w0d   CHTN/HARVEY    Plan:   Talked through anxiety, reassured  Connected mom BPs daily are reassuring and normal  Will do labs  Discussed cervical ripening - desires to proceed; scheduled per new protocol  Given bello balloon discharge sheet and IOL sheet  PNT already sched to follow    Labor, preE precautions.    Vivienne Almanza MD  Obstetrics & Gynecology   [facility]

## 2023-06-14 ENCOUNTER — PATIENT MESSAGE (OUTPATIENT)
Dept: OBSTETRICS AND GYNECOLOGY | Facility: CLINIC | Age: 33
End: 2023-06-14
Payer: OTHER GOVERNMENT

## 2023-06-19 ENCOUNTER — TELEPHONE (OUTPATIENT)
Dept: OBSTETRICS AND GYNECOLOGY | Facility: OTHER | Age: 33
End: 2023-06-19

## 2023-06-19 ENCOUNTER — HOSPITAL ENCOUNTER (INPATIENT)
Facility: OTHER | Age: 33
LOS: 3 days | Discharge: HOME | End: 2023-06-22
Attending: OBSTETRICS & GYNECOLOGY | Admitting: STUDENT IN AN ORGANIZED HEALTH CARE EDUCATION/TRAINING PROGRAM
Payer: OTHER GOVERNMENT

## 2023-06-19 ENCOUNTER — ANESTHESIA EVENT (OUTPATIENT)
Dept: OBSTETRICS AND GYNECOLOGY | Facility: OTHER | Age: 33
End: 2023-06-19
Payer: OTHER GOVERNMENT

## 2023-06-19 DIAGNOSIS — R03.0 ELEVATED BLOOD PRESSURE READING: ICD-10-CM

## 2023-06-19 DIAGNOSIS — Z34.90 ENCOUNTER FOR INDUCTION OF LABOR: ICD-10-CM

## 2023-06-19 DIAGNOSIS — Z3A.38 38 WEEKS GESTATION OF PREGNANCY: ICD-10-CM

## 2023-06-19 DIAGNOSIS — F41.9 ANXIETY: ICD-10-CM

## 2023-06-19 DIAGNOSIS — O10.919 CHRONIC HYPERTENSION AFFECTING PREGNANCY: ICD-10-CM

## 2023-06-19 LAB
ABO + RH BLD: NORMAL
ALBUMIN SERPL BCP-MCNC: 2.8 G/DL (ref 3.5–5.2)
ALP SERPL-CCNC: 108 U/L (ref 55–135)
ALT SERPL W/O P-5'-P-CCNC: 42 U/L (ref 10–44)
ANION GAP SERPL CALC-SCNC: 9 MMOL/L (ref 8–16)
AST SERPL-CCNC: 27 U/L (ref 10–40)
BASOPHILS # BLD AUTO: 0.03 K/UL (ref 0–0.2)
BASOPHILS NFR BLD: 0.2 % (ref 0–1.9)
BILIRUB SERPL-MCNC: 0.1 MG/DL (ref 0.1–1)
BLD GP AB SCN CELLS X3 SERPL QL: NORMAL
BUN SERPL-MCNC: 12 MG/DL (ref 6–20)
CALCIUM SERPL-MCNC: 9.4 MG/DL (ref 8.7–10.5)
CHLORIDE SERPL-SCNC: 108 MMOL/L (ref 95–110)
CO2 SERPL-SCNC: 19 MMOL/L (ref 23–29)
CREAT SERPL-MCNC: 0.8 MG/DL (ref 0.5–1.4)
CREAT UR-MCNC: 86.4 MG/DL (ref 15–325)
DIFFERENTIAL METHOD: ABNORMAL
EOSINOPHIL # BLD AUTO: 0.2 K/UL (ref 0–0.5)
EOSINOPHIL NFR BLD: 1.3 % (ref 0–8)
ERYTHROCYTE [DISTWIDTH] IN BLOOD BY AUTOMATED COUNT: 14.5 % (ref 11.5–14.5)
EST. GFR  (NO RACE VARIABLE): >60 ML/MIN/1.73 M^2
GLUCOSE SERPL-MCNC: 78 MG/DL (ref 70–110)
HCT VFR BLD AUTO: 36.4 % (ref 37–48.5)
HGB BLD-MCNC: 11.5 G/DL (ref 12–16)
IMM GRANULOCYTES # BLD AUTO: 0.17 K/UL (ref 0–0.04)
IMM GRANULOCYTES NFR BLD AUTO: 1.4 % (ref 0–0.5)
LYMPHOCYTES # BLD AUTO: 1.6 K/UL (ref 1–4.8)
LYMPHOCYTES NFR BLD: 13.2 % (ref 18–48)
MCH RBC QN AUTO: 28.7 PG (ref 27–31)
MCHC RBC AUTO-ENTMCNC: 31.6 G/DL (ref 32–36)
MCV RBC AUTO: 91 FL (ref 82–98)
MONOCYTES # BLD AUTO: 1 K/UL (ref 0.3–1)
MONOCYTES NFR BLD: 8.5 % (ref 4–15)
NEUTROPHILS # BLD AUTO: 9.3 K/UL (ref 1.8–7.7)
NEUTROPHILS NFR BLD: 75.4 % (ref 38–73)
NRBC BLD-RTO: 0 /100 WBC
PLATELET # BLD AUTO: 218 K/UL (ref 150–450)
PMV BLD AUTO: 11.6 FL (ref 9.2–12.9)
POTASSIUM SERPL-SCNC: 4.4 MMOL/L (ref 3.5–5.1)
PROT SERPL-MCNC: 6.6 G/DL (ref 6–8.4)
PROT UR-MCNC: 10 MG/DL (ref 0–15)
PROT/CREAT UR: 0.12 MG/G{CREAT} (ref 0–0.2)
RBC # BLD AUTO: 4.01 M/UL (ref 4–5.4)
SODIUM SERPL-SCNC: 136 MMOL/L (ref 136–145)
SPECIMEN OUTDATE: NORMAL
WBC # BLD AUTO: 12.27 K/UL (ref 3.9–12.7)

## 2023-06-19 PROCEDURE — 25000003 PHARM REV CODE 250

## 2023-06-19 PROCEDURE — 63600175 PHARM REV CODE 636 W HCPCS: Performed by: STUDENT IN AN ORGANIZED HEALTH CARE EDUCATION/TRAINING PROGRAM

## 2023-06-19 PROCEDURE — 99284 PR EMERGENCY DEPT VISIT,LEVEL IV: ICD-10-PCS | Mod: 25,,, | Performed by: OBSTETRICS & GYNECOLOGY

## 2023-06-19 PROCEDURE — 99284 EMERGENCY DEPT VISIT MOD MDM: CPT | Mod: 25,,, | Performed by: OBSTETRICS & GYNECOLOGY

## 2023-06-19 PROCEDURE — 59025 PR FETAL 2N-STRESS TEST: ICD-10-PCS | Mod: 26,,, | Performed by: OBSTETRICS & GYNECOLOGY

## 2023-06-19 PROCEDURE — 59025 FETAL NON-STRESS TEST: CPT | Mod: 26,,, | Performed by: OBSTETRICS & GYNECOLOGY

## 2023-06-19 PROCEDURE — 25000003 PHARM REV CODE 250: Performed by: STUDENT IN AN ORGANIZED HEALTH CARE EDUCATION/TRAINING PROGRAM

## 2023-06-19 PROCEDURE — 59025 FETAL NON-STRESS TEST: CPT

## 2023-06-19 PROCEDURE — 85025 COMPLETE CBC W/AUTO DIFF WBC: CPT

## 2023-06-19 PROCEDURE — 11000001 HC ACUTE MED/SURG PRIVATE ROOM

## 2023-06-19 PROCEDURE — 86900 BLOOD TYPING SEROLOGIC ABO: CPT

## 2023-06-19 PROCEDURE — 84156 ASSAY OF PROTEIN URINE: CPT

## 2023-06-19 PROCEDURE — 80053 COMPREHEN METABOLIC PANEL: CPT

## 2023-06-19 PROCEDURE — 99285 EMERGENCY DEPT VISIT HI MDM: CPT

## 2023-06-19 RX ORDER — METHYLERGONOVINE MALEATE 0.2 MG/ML
200 INJECTION INTRAVENOUS
Status: DISCONTINUED | OUTPATIENT
Start: 2023-06-19 | End: 2023-06-20

## 2023-06-19 RX ORDER — SIMETHICONE 80 MG
1 TABLET,CHEWABLE ORAL 4 TIMES DAILY PRN
Status: DISCONTINUED | OUTPATIENT
Start: 2023-06-19 | End: 2023-06-20

## 2023-06-19 RX ORDER — TERBUTALINE SULFATE 1 MG/ML
0.25 INJECTION SUBCUTANEOUS
Status: DISCONTINUED | OUTPATIENT
Start: 2023-06-19 | End: 2023-06-20

## 2023-06-19 RX ORDER — PROCHLORPERAZINE EDISYLATE 5 MG/ML
5 INJECTION INTRAMUSCULAR; INTRAVENOUS EVERY 6 HOURS PRN
Status: DISCONTINUED | OUTPATIENT
Start: 2023-06-19 | End: 2023-06-20

## 2023-06-19 RX ORDER — CARBOPROST TROMETHAMINE 250 UG/ML
250 INJECTION, SOLUTION INTRAMUSCULAR
Status: DISCONTINUED | OUTPATIENT
Start: 2023-06-19 | End: 2023-06-20

## 2023-06-19 RX ORDER — OXYTOCIN/RINGER'S LACTATE 30/500 ML
334 PLASTIC BAG, INJECTION (ML) INTRAVENOUS ONCE
Status: DISCONTINUED | OUTPATIENT
Start: 2023-06-19 | End: 2023-06-20

## 2023-06-19 RX ORDER — OXYTOCIN/RINGER'S LACTATE 30/500 ML
95 PLASTIC BAG, INJECTION (ML) INTRAVENOUS ONCE AS NEEDED
Status: DISCONTINUED | OUTPATIENT
Start: 2023-06-19 | End: 2023-06-20

## 2023-06-19 RX ORDER — MISOPROSTOL 100 MCG
25 TABLET ORAL EVERY 4 HOURS PRN
Status: DISCONTINUED | OUTPATIENT
Start: 2023-06-19 | End: 2023-06-19

## 2023-06-19 RX ORDER — ACETAMINOPHEN 325 MG/1
650 TABLET ORAL EVERY 6 HOURS PRN
Status: DISCONTINUED | OUTPATIENT
Start: 2023-06-19 | End: 2023-06-20

## 2023-06-19 RX ORDER — DIPHENOXYLATE HYDROCHLORIDE AND ATROPINE SULFATE 2.5; .025 MG/1; MG/1
1 TABLET ORAL 4 TIMES DAILY PRN
Status: DISCONTINUED | OUTPATIENT
Start: 2023-06-19 | End: 2023-06-20

## 2023-06-19 RX ORDER — ONDANSETRON 8 MG/1
8 TABLET, ORALLY DISINTEGRATING ORAL EVERY 8 HOURS PRN
Status: DISCONTINUED | OUTPATIENT
Start: 2023-06-19 | End: 2023-06-20

## 2023-06-19 RX ORDER — OXYTOCIN/RINGER'S LACTATE 30/500 ML
0-30 PLASTIC BAG, INJECTION (ML) INTRAVENOUS CONTINUOUS
Status: DISCONTINUED | OUTPATIENT
Start: 2023-06-19 | End: 2023-06-19

## 2023-06-19 RX ORDER — OXYTOCIN 10 [USP'U]/ML
10 INJECTION, SOLUTION INTRAMUSCULAR; INTRAVENOUS ONCE AS NEEDED
Status: DISCONTINUED | OUTPATIENT
Start: 2023-06-19 | End: 2023-06-20

## 2023-06-19 RX ORDER — OXYTOCIN/RINGER'S LACTATE 30/500 ML
334 PLASTIC BAG, INJECTION (ML) INTRAVENOUS ONCE AS NEEDED
Status: DISCONTINUED | OUTPATIENT
Start: 2023-06-19 | End: 2023-06-20

## 2023-06-19 RX ORDER — TRANEXAMIC ACID 10 MG/ML
1000 INJECTION, SOLUTION INTRAVENOUS ONCE AS NEEDED
Status: DISCONTINUED | OUTPATIENT
Start: 2023-06-19 | End: 2023-06-20

## 2023-06-19 RX ORDER — MISOPROSTOL 200 UG/1
800 TABLET ORAL ONCE AS NEEDED
Status: DISCONTINUED | OUTPATIENT
Start: 2023-06-19 | End: 2023-06-20

## 2023-06-19 RX ORDER — CALCIUM CARBONATE 200(500)MG
500 TABLET,CHEWABLE ORAL 3 TIMES DAILY PRN
Status: DISCONTINUED | OUTPATIENT
Start: 2023-06-19 | End: 2023-06-20

## 2023-06-19 RX ORDER — OXYTOCIN/RINGER'S LACTATE 30/500 ML
95 PLASTIC BAG, INJECTION (ML) INTRAVENOUS ONCE
Status: DISCONTINUED | OUTPATIENT
Start: 2023-06-19 | End: 2023-06-20

## 2023-06-19 RX ORDER — SODIUM CHLORIDE 9 MG/ML
INJECTION, SOLUTION INTRAVENOUS
Status: DISCONTINUED | OUTPATIENT
Start: 2023-06-19 | End: 2023-06-20

## 2023-06-19 RX ORDER — MUPIROCIN 20 MG/G
OINTMENT TOPICAL
Status: DISCONTINUED | OUTPATIENT
Start: 2023-06-19 | End: 2023-06-20

## 2023-06-19 RX ORDER — SODIUM CHLORIDE, SODIUM LACTATE, POTASSIUM CHLORIDE, CALCIUM CHLORIDE 600; 310; 30; 20 MG/100ML; MG/100ML; MG/100ML; MG/100ML
INJECTION, SOLUTION INTRAVENOUS CONTINUOUS
Status: DISCONTINUED | OUTPATIENT
Start: 2023-06-19 | End: 2023-06-20

## 2023-06-19 RX ORDER — LIDOCAINE HYDROCHLORIDE 10 MG/ML
10 INJECTION INFILTRATION; PERINEURAL ONCE AS NEEDED
Status: DISCONTINUED | OUTPATIENT
Start: 2023-06-19 | End: 2023-06-20

## 2023-06-19 RX ORDER — OXYTOCIN/RINGER'S LACTATE 30/500 ML
0-30 PLASTIC BAG, INJECTION (ML) INTRAVENOUS CONTINUOUS
Status: DISCONTINUED | OUTPATIENT
Start: 2023-06-19 | End: 2023-06-20

## 2023-06-19 RX ORDER — LANOLIN ALCOHOL/MO/W.PET/CERES
1 CREAM (GRAM) TOPICAL DAILY
Status: DISCONTINUED | OUTPATIENT
Start: 2023-06-19 | End: 2023-06-20

## 2023-06-19 RX ORDER — MISOPROSTOL 200 UG/1
800 TABLET ORAL
Status: DISCONTINUED | OUTPATIENT
Start: 2023-06-19 | End: 2023-06-20

## 2023-06-19 RX ORDER — LABETALOL 100 MG/1
100 TABLET, FILM COATED ORAL 2 TIMES DAILY
Status: DISCONTINUED | OUTPATIENT
Start: 2023-06-19 | End: 2023-06-20

## 2023-06-19 RX ADMIN — MISOPROSTOL 50 MCG: 100 TABLET ORAL at 01:06

## 2023-06-19 RX ADMIN — LABETALOL HYDROCHLORIDE 100 MG: 100 TABLET, FILM COATED ORAL at 09:06

## 2023-06-19 RX ADMIN — SODIUM CHLORIDE, POTASSIUM CHLORIDE, SODIUM LACTATE AND CALCIUM CHLORIDE: 600; 310; 30; 20 INJECTION, SOLUTION INTRAVENOUS at 08:06

## 2023-06-19 RX ADMIN — Medication 2 MILLI-UNITS/MIN: at 08:06

## 2023-06-19 NOTE — PROGRESS NOTES
LABOR NOTE    Resident to bedside for routine cervical check.    S:  Complaints: No.  Epidural working:  not applicable    O: BP (!) 140/85   Pulse 79   Temp 98.2 °F (36.8 °C) (Oral)   Resp 16   LMP 2022 (Exact Date)   SpO2 98%       FHT: Cat 1 (reassuring), baseline 135, mod BTBV, + accels, - decels  CTX: q 2-3 minutes  SVE: 2      ASSESSMENT:   32 y.o.  at 38w6d, IOL    FHT reassuring    Active Hospital Problems    Diagnosis  POA    *Encounter for induction of labor [Z34.90]  Not Applicable      Resolved Hospital Problems   No resolved problems to display.       TIMELINE:  1245:/-4, bello balloon placed  1345: cytotec given  1745: , s/p balloon    PLAN:  Will start pitocin  Patient wants to think about possible epidural and AROM  Continue Close Maternal/Fetal Monitoring  Pitocin Augmentation per protocol  Recheck 4 hours or PRN    Vivi Bhat M.D.  OB/GYN PGY-3

## 2023-06-19 NOTE — PLAN OF CARE
This patient has been screened for Social Work discharge planning needs. Based on  documentation in medical record , no discharge planning needs are anticipated at this time. Should any discharge planning needs arise, please consult .       23 1510   OB SCREEN   Assessment Type Discharge Planning Assessment   Source of Information health record   Received Prenatal Care Yes   Any indications/suspicions for None   Is this a teen pregnancy No   Is the baby in NICU No   Indication for adoption/Safe Haven No   Indication for DME/post-acute needs No   HIV (+) No   Any congenital  disorders No   Fetal demise/ death No     SUNDAR Prieto  [phone]

## 2023-06-19 NOTE — ED PROVIDER NOTES
Encounter Date: 2023       History     Chief Complaint   Patient presents with    Hypertension            HPI  Vannesa Wilkins is a 32 y.o. [id number] at 38w6d presents complaining of elevated BP at home (150/100s). She has a h/o cHTN on labetalol 100 BID, compliant with meds. She is expressing significant anxiety. Denies HA, CP, SOB, vision changes, RUQ abdominal pain. Denies VB, LOF, ctx. Normal fetal movement.    This IUP is complicated by cHTN (labetalol 100 BID), anxiety, chronic transaminitis (followed by hepatology), chiari 1 malformation, uterine fibroids, h/o remote seizure x1.        Review of patient's allergies indicates:  No Known Allergies  Past Medical History:   Diagnosis Date    Anxiety 3/6/2023    Anxiety disorder, unspecified 2022    Chiari malformation type I     Seizures 2007    Grand mal once  EEG  photot sensitivty      Past Surgical History:   Procedure Laterality Date    KNEE ARTHROSCOPY W/ DEBRIDEMENT Left     MCL tear     Widsom Teeth Extraction Bilateral      Family History   Problem Relation Age of Onset    Ovarian cancer Neg Hx     Colon cancer Neg Hx     Breast cancer Neg Hx      Social History     Tobacco Use    Smoking status: Never    Smokeless tobacco: Never   Substance Use Topics    Alcohol use: Not Currently     Comment: pre preg    Drug use: Not Currently     Types: Methamphetamines     Comment: prescribed ritalin for dx ADHD     Review of Systems   Constitutional:  Negative for chills and fever.   HENT:  Negative for sore throat.    Eyes:  Negative for visual disturbance.   Respiratory:  Negative for cough and shortness of breath.    Cardiovascular:  Negative for chest pain.   Gastrointestinal:  Negative for abdominal pain, diarrhea, nausea and vomiting.   Genitourinary:  Negative for difficulty urinating, pelvic pain, vaginal bleeding and vaginal discharge.   Musculoskeletal:  Negative for myalgias.   Skin:  Negative for rash.   Neurological:  Negative for dizziness and  headaches.   Psychiatric/Behavioral:  Negative for confusion. The patient is nervous/anxious.      Physical Exam     Initial Vitals   BP Pulse Resp Temp SpO2   23 1137 23 1115 23 1121 23 1121 23 1116   136/72 98 16 98.5 °F (36.9 °C) 98 %      MAP       --                Physical Exam    Vitals reviewed.  Constitutional: She appears well-developed and well-nourished. She is not diaphoretic. No distress.   HENT:   Head: Normocephalic and atraumatic.   Eyes: EOM are normal.   Neck:   Normal range of motion.  Cardiovascular:  Normal rate.           Pulmonary/Chest: No respiratory distress.   Abdominal: Abdomen is soft. There is no abdominal tenderness. There is no rebound and no guarding.   Musculoskeletal:         General: Normal range of motion.      Cervical back: Normal range of motion.     Neurological: She is alert and oriented to person, place, and time.   Skin: Skin is warm and dry.   Psychiatric: She has a normal mood and affect. Her behavior is normal. Judgment and thought content normal.     OB LABOR EXAM:       Method: Sterile vaginal exam per MD.   Vaginal Bleeding: none present.     Dilatation: 1.   Station: -3.   Effacement: 60%.   Amniotic Fluid Color: no fluid.         ED Course   Obtain Fetal nonstress test (NST)    Date/Time: 2023 11:34 AM  Performed by: Eva De La Torre MD  Authorized by: Eva De La Torre MD     Nonstress Test:     Variability:  6-25 BPM    Decelerations:  None    Accelerations:  15 bpm    Baseline:  145    Contractions:  Not present  Labs Reviewed   CBC W/ AUTO DIFFERENTIAL   COMPREHENSIVE METABOLIC PANEL   PROTEIN / CREATININE RATIO, URINE   TYPE & SCREEN          Imaging Results    None          Medications   ferrous sulfate tablet 1 each (has no administration in time range)   labetaloL tablet 100 mg (has no administration in time range)     Medical Decision Making:   ED Management:   at 38w6d with elevated BP at home  VSS, no acute  distress  Asymptomatic  Mild range BP in ROS  NST R&R  [place] without contractions  Patient to be admitted to L&D for IOL   preE labs collected  Gonzalez balloon placed in ROS  See full H&P to follow          Attending Attestation:   Physician Attestation Statement for Resident:  As the supervising MD   Physician Attestation Statement: I have personally seen and examined this patient.   I agree with the above history.  -:   As the supervising MD I agree with the above PE.     As the supervising MD I agree with the above treatment, course, plan, and disposition.   I was personally present during the critical portions of the procedure(s) performed by the resident and was immediately available in the ED to provide services and assistance as needed during the entire procedure.                             Clinical Impression:   Final diagnoses:  [F41.9] Anxiety  [R03.0] Elevated blood pressure reading (Primary)  [O10.919] Chronic hypertension affecting pregnancy  [Z3A.38] 38 weeks gestation of pregnancy        ED Disposition Condition    Send to L&D Stable                Eva De La Torre MD  Resident  06/19/23 9628       Sahara Mack MD  06/19/23 1241

## 2023-06-19 NOTE — H&P
HISTORY AND PHYSICAL                                                OBSTETRICS          Subjective:       Vannesa Wilkins is a 32 y.o.  female with IUP at 38w6d weeks gestation who presents with IOL secondary to cHTN (on labetalol 100 BID).     Patient denies contractions, denies vaginal bleeding, denies LOF.   Fetal Movement: normal.    This IUP is complicated by cHTN (on labetalol 100 BID), Obesity (Prepreg BMI 33), Anxiety, uterine fibroids (5 and 1 cm, anterior/fundal).    Review of Systems   Constitutional:  Negative for fever.   Respiratory:  Negative for shortness of breath.    Cardiovascular:  Negative for chest pain.   Gastrointestinal:  Negative for abdominal pain, nausea and vomiting.   Endocrine: Negative for hot flashes.   Genitourinary:  Negative for pelvic pain and vaginal bleeding.   Integumentary:  Negative for breast mass, nipple discharge and breast skin changes.   Neurological:  Negative for headaches.   Hematological:  Does not bruise/bleed easily.   Psychiatric/Behavioral:  Negative for depression.    Breast: Negative for mass, mastodynia, nipple discharge and skin changes    PMHx:   Past Medical History:   Diagnosis Date    Anxiety 3/6/2023    Anxiety disorder, unspecified 2022    Chiari malformation type I     Seizures 2007    Grand mal once  EEG  photot sensitivty        PSHx:   Past Surgical History:   Procedure Laterality Date    KNEE ARTHROSCOPY W/ DEBRIDEMENT Left     MCL tear     Widsom Teeth Extraction Bilateral        All: Review of patient's allergies indicates:  No Known Allergies    Meds:   Medications Prior to Admission   Medication Sig Dispense Refill Last Dose    choline bit/AA 10/LORETTA/bobl234 (CHOLINE-AA#10-LORETTA-HRB#129 ORAL) Take by mouth.       diphenhydramine HCl (UNISOM, DIPHENHYDRAMINE, ORAL) Take by mouth.       ferrous sulfate 325 (65 FE) MG EC tablet Take 25 mg by mouth once daily.       labetaloL (NORMODYNE) 100 MG tablet Take 1 tablet (100 mg total) by  mouth 2 (two) times daily. 60 tablet 11     omeprazole (PRILOSEC) 20 MG capsule Take 1 capsule (20 mg total) by mouth once daily. 30 capsule 0     prenatal vit no.124/iron/folic (PRENATAL VITAMIN ORAL) Take by mouth.          SH:   Social History     Socioeconomic History    Marital status:    Tobacco Use    Smoking status: Never    Smokeless tobacco: Never   Substance and Sexual Activity    Alcohol use: Not Currently     Comment: pre preg    Drug use: Not Currently     Types: Methamphetamines     Comment: prescribed ritalin for dx ADHD    Sexual activity: Yes     Partners: Male     Birth control/protection: None     Social Determinants of Health     Financial Resource Strain: Low Risk     Difficulty of Paying Living Expenses: Not hard at all   Food Insecurity: No Food Insecurity    Worried About Running Out of Food in the Last Year: Never true    Ran Out of Food in the Last Year: Never true   Transportation Needs: No Transportation Needs    Lack of Transportation (Medical): No    Lack of Transportation (Non-Medical): No   Physical Activity: Sufficiently Active    Days of Exercise per Week: 5 days    Minutes of Exercise per Session: 40 min   Stress: Stress Concern Present    Feeling of Stress : To some extent   Social Connections: Unknown    Frequency of Communication with Friends and Family: Patient refused    Frequency of Social Gatherings with Friends and Family: Patient refused    Active Member of Clubs or Organizations: Patient refused    Attends Club or Organization Meetings: Patient refused    Marital Status: Patient refused   Housing Stability: Unknown    Unable to Pay for Housing in the Last Year: No    Unstable Housing in the Last Year: No       FH:   Family History   Problem Relation Age of Onset    Ovarian cancer Neg Hx     Colon cancer Neg Hx     Breast cancer Neg Hx        OBHx:   OB History    Para Term  AB Living   1 0 0 0 0 0   SAB IAB Ectopic Multiple Live Births   0 0 0 0 0       # Outcome Date GA Lbr Junior/2nd Weight Sex Delivery Anes PTL Lv   1 Current                Objective:       /78   Pulse 84   Temp 98.5 °F (36.9 °C)   Resp 16   LMP 09/18/2022 (Exact Date)   SpO2 98%     Vitals:    06/19/23 1151 06/19/23 1153 06/19/23 1208 06/19/23 1211   BP:  (!) 142/78 139/78    Pulse: 82 82 93 84   Resp:       Temp:       SpO2: 96%   98%       General: NAD, alert, oriented, cooperative  HEENT: NCAT, EOM grossly intact  Lungs: Normal WOB  Heart: regular rate  Abdomen: gravid, soft, nondistended, nontender, no rebound or guarding  Extremities: mild edema    FHT: 145 bpm, moderate ermelinda, +accels, -decels; Cat 1 (reassuring)  [place]: no contractions  Presentation: cephalic by ultrasound    Cervix: 1/60/-3    EFW by Leopold's: 7    Recent Growth Scan: 2452g (14%), AC 22% at 36w0d (5/30)    Lab Review  Blood Type A POS  GBBS: negative  Rubella: Immune  RPR: NR  HIV: negative  HepB: negative       Assessment:       38w6d weeks gestation with cHTN for IOL    Active Hospital Problems    Diagnosis  POA    *Encounter for induction of labor [Z34.90]  Not Applicable      Resolved Hospital Problems   No resolved problems to display.          Plan:     IOL   Risks, benefits, alternatives and possible complications have been discussed in detail with the patient.   - Consents signed and to chart  - Admit to Labor and Delivery unit  - Epidural per Anesthesia  - Draw CBC, T&S  - Notify Staff  - bello balloon placed in ROS without difficulty, will give cytotec PO  - Recheck in 4 hrs or PRN  - Post-Partum Hemorrhage risk - low  - Postpartum lovenox: no  - Contraception: NFP/condoms    cHTN  - on labetalol 100 BID  - high-mild range BP in ROS  - preE labs on admit  - will CTM closely    Obesity  - Prepreg BMI 33  - current BMI 43  - encourage ambulation  - SCDs while in bed    Anxiety  - significant HARVEY  - not on meds  - will need pp mood check    Uterine fibroids  - 5 and 1 cm, anterior/fundal          Eva  MD Britt   OB/GYN PGY1  [facility]

## 2023-06-19 NOTE — ANESTHESIA PREPROCEDURE EVALUATION
"[facility]  Anesthesia Pre-Operative Evaluation         Patient Name: Vannesa Wilkins  YOB: 1990  MRN: [id number]    2023      Vannesa Wilkins is a 32 y.o. female  @ 38w6d who presents for IOL 2/2 elevated BP. She has a PMH of cHTN, anxiety, chronic transaminitis (followed by hepatology), chiari 1 malformation, uterine fibroids, h/o remote seizure x1.        As far as chiari malformation she was seen in outpateint setting and imaging was performed. Per chart review "There should be no problem with an epidural as long as she is asymptomatic. If she does develop symptoms then I would proceed with repeat imaging prior"         She otherwise has no noted bleeding/clotting disorders, significant cardiopulmonary disease, or spinal pathology.    OB History    Para Term  AB Living   1 0 0 0 0 0   SAB IAB Ectopic Multiple Live Births   0 0 0 0 0      # Outcome Date GA Lbr Junior/2nd Weight Sex Delivery Anes PTL Lv   1 Current                Review of patient's allergies indicates:  No Known Allergies    Wt Readings from Last 1 Encounters:   23 1005 113.3 kg (249 lb 12.5 oz)       BP Readings from Last 3 Encounters:   23 139/78   23 (!) 154/90   23 (!) 142/92       Patient Active Problem List   Diagnosis    Lumbar back pain with radiculopathy affecting lower extremity    Chiari malformation type I    Elevated liver enzymes    Anxiety    Chronic hypertension affecting pregnancy    Encounter for induction of labor       Past Surgical History:   Procedure Laterality Date    KNEE ARTHROSCOPY W/ DEBRIDEMENT Left     MCL tear     Widsom Teeth Extraction Bilateral        Social History     Socioeconomic History    Marital status:    Tobacco Use    Smoking status: Never    Smokeless tobacco: Never   Substance and Sexual Activity    Alcohol use: Not Currently     Comment: pre preg    Drug use: Not Currently     Types: Methamphetamines    "  Comment: prescribed ritalin for dx ADHD    Sexual activity: Yes     Partners: Male     Birth control/protection: None     Social Determinants of Health     Financial Resource Strain: Low Risk     Difficulty of Paying Living Expenses: Not hard at all   Food Insecurity: No Food Insecurity    Worried About Running Out of Food in the Last Year: Never true    Ran Out of Food in the Last Year: Never true   Transportation Needs: No Transportation Needs    Lack of Transportation (Medical): No    Lack of Transportation (Non-Medical): No   Physical Activity: Sufficiently Active    Days of Exercise per Week: 5 days    Minutes of Exercise per Session: 40 min   Stress: Stress Concern Present    Feeling of Stress : To some extent   Social Connections: Unknown    Frequency of Communication with Friends and Family: Patient refused    Frequency of Social Gatherings with Friends and Family: Patient refused    Active Member of Clubs or Organizations: Patient refused    Attends Club or Organization Meetings: Patient refused    Marital Status: Patient refused   Housing Stability: Unknown    Unable to Pay for Housing in the Last Year: No    Unstable Housing in the Last Year: No         Chemistry        Component Value Date/Time     (L) 06/13/2023 1200    K 4.6 06/13/2023 1200     06/13/2023 1200    CO2 19 (L) 06/13/2023 1200    BUN 11 06/13/2023 1200    CREATININE 0.8 06/13/2023 1200     (H) 06/13/2023 1200        Component Value Date/Time    CALCIUM 9.1 06/13/2023 1200    ALKPHOS 103 06/13/2023 1200    AST 27 06/13/2023 1200    ALT 47 (H) 06/13/2023 1200    BILITOT 0.1 06/13/2023 1200    ESTGFRAFRICA >60 03/01/2022 1059    EGFRNONAA >60 03/01/2022 1059            Lab Results   Component Value Date    WBC 12.27 06/19/2023    HGB 11.5 (L) 06/19/2023    HCT 36.4 (L) 06/19/2023    MCV 91 06/19/2023     06/19/2023       No results for input(s): PT, INR, PROTIME, APTT in the last 72  hours.          Pre-op Assessment    I have reviewed the Patient Summary Reports.     I have reviewed the Nursing Notes.    I have reviewed the Medications.     Review of Systems  Anesthesia Hx:  No problems with previous Anesthesia  Neg history of prior surgery. Denies Family Hx of Anesthesia complications.    Social:  No Alcohol Use, Non-Smoker    Hematology/Oncology:  Hematology Normal   Oncology Normal     EENT/Dental:EENT/Dental Normal   Cardiovascular:   Hypertension  Denies Angina.    Pulmonary:  Pulmonary Normal  Denies COPD.  Denies Asthma.    Renal/:  Renal/ Normal  Denies Chronic Renal Disease.     Hepatic/GI:  Hepatic/GI Normal Denies Liver Disease.    Musculoskeletal:  Musculoskeletal Normal    Neurological:   Denies CVA. Seizures Chiari 1   Endocrine:  Endocrine Normal Denies Diabetes.    Psych:   anxiety          Physical Exam  General: Well nourished and Cooperative    Airway:  Mallampati: II   Mouth Opening: Normal  TM Distance: Normal  Tongue: Normal  Neck ROM: Normal ROM    Dental:  Intact    Chest/Lungs:  Normal Respiratory Rate    Heart:  Rate: Normal  Rhythm: Regular Rhythm        Anesthesia Plan  Type of Anesthesia, risks & benefits discussed:    Anesthesia Type: Epidural, Spinal, CSE  Intra-op Monitoring Plan: Standard ASA Monitors  Post Op Pain Control Plan: epidural analgesia, intrathecal opioid and multimodal analgesia  Informed Consent: Informed consent signed with the Patient and all parties understand the risks and agree with anesthesia plan.  All questions answered.   ASA Score: 2  Day of Surgery Review of History & Physical: H&P Update referred to the surgeon/provider.  Anesthesia Plan Notes: From her consult visit :  She has a history of Chiari type 1 malformation which was incidentally found after a seizure at age 16. The patient is completely asymptomatic from the Chiari standpoint. She saw neurosurgery and had an MRI in July 2022. I reached out to the PA that she saw with  neurosurgery, Sandra Bronson, to ask for recommendations regarding a spinal/epidural and she stated it was safe to proceed with epidural.  She is planning for a vaginal delivery. We discussed alternative options for pain control including nitrous oxide, remifentanil PCA and general anesthesia. She had 1 seizure at the age of 16 which was attributed to strobe lights and hyperventilation, none since. She is not on any seizure medications.   She has elevated liver enzymes, she is followed by hepatology. Unclear etiology. This will be investigated further in the post partum period    Ready For Surgery From Anesthesia Perspective.     .

## 2023-06-19 NOTE — CARE UPDATE
LATE ENTRY 1245    Resident to bedside for bello balloon placement. SVE 1/50/-4. Bello balloon placed without difficulty. Cytotec ordered for once on L&D. Cat 1 tracing. Recheck in 4 hours or PRN.     Felecia Blanco MD PGY-1  Obstetrics and Gynecology  [facility]

## 2023-06-19 NOTE — ED NOTES
Patient presents to the ROS with a chief complaint of high blood pressure reading at home (150/100). Patient reports chronic hypertension. Denies headache, visual changes, and RUQ pain. Patient reports +FM, denies VB, LOF, and/ or ctx's. Urine sample collected. Bella STEWART notified.

## 2023-06-20 PROBLEM — Z34.90 ENCOUNTER FOR INDUCTION OF LABOR: Status: RESOLVED | Noted: 2023-06-19 | Resolved: 2023-06-20

## 2023-06-20 PROCEDURE — 63600175 PHARM REV CODE 636 W HCPCS: Performed by: STUDENT IN AN ORGANIZED HEALTH CARE EDUCATION/TRAINING PROGRAM

## 2023-06-20 PROCEDURE — 59400 OBSTETRICAL CARE: CPT | Mod: ,,, | Performed by: STUDENT IN AN ORGANIZED HEALTH CARE EDUCATION/TRAINING PROGRAM

## 2023-06-20 PROCEDURE — 25000003 PHARM REV CODE 250

## 2023-06-20 PROCEDURE — 51702 INSERT TEMP BLADDER CATH: CPT

## 2023-06-20 PROCEDURE — 59409 PRA ETRICAL CARE,VAG DELIV ONLY: ICD-10-PCS | Mod: AA,,, | Performed by: ANESTHESIOLOGY

## 2023-06-20 PROCEDURE — C1751 CATH, INF, PER/CENT/MIDLINE: HCPCS | Performed by: ANESTHESIOLOGY

## 2023-06-20 PROCEDURE — 72100002 HC LABOR CARE, 1ST 8 HOURS

## 2023-06-20 PROCEDURE — 72200005 HC VAGINAL DELIVERY LEVEL II

## 2023-06-20 PROCEDURE — 59400 PR FULL ROUT OBSTE CARE,VAGINAL DELIV: ICD-10-PCS | Mod: ,,, | Performed by: STUDENT IN AN ORGANIZED HEALTH CARE EDUCATION/TRAINING PROGRAM

## 2023-06-20 PROCEDURE — 11000001 HC ACUTE MED/SURG PRIVATE ROOM

## 2023-06-20 PROCEDURE — 25000003 PHARM REV CODE 250: Performed by: STUDENT IN AN ORGANIZED HEALTH CARE EDUCATION/TRAINING PROGRAM

## 2023-06-20 PROCEDURE — 27000181 HC CABLE, IUPC

## 2023-06-20 PROCEDURE — 59409 OBSTETRICAL CARE: CPT | Mod: AA,,, | Performed by: ANESTHESIOLOGY

## 2023-06-20 PROCEDURE — 27200710 HC EPIDURAL INFUSION PUMP SET: Performed by: ANESTHESIOLOGY

## 2023-06-20 PROCEDURE — 63600175 PHARM REV CODE 636 W HCPCS

## 2023-06-20 PROCEDURE — 72100003 HC LABOR CARE, EA. ADDL. 8 HRS

## 2023-06-20 PROCEDURE — 62326 NJX INTERLAMINAR LMBR/SAC: CPT | Performed by: STUDENT IN AN ORGANIZED HEALTH CARE EDUCATION/TRAINING PROGRAM

## 2023-06-20 RX ORDER — PROCHLORPERAZINE EDISYLATE 5 MG/ML
5 INJECTION INTRAMUSCULAR; INTRAVENOUS EVERY 6 HOURS PRN
Status: DISCONTINUED | OUTPATIENT
Start: 2023-06-20 | End: 2023-06-22 | Stop reason: HOSPADM

## 2023-06-20 RX ORDER — IBUPROFEN 600 MG/1
600 TABLET ORAL EVERY 6 HOURS
Status: DISCONTINUED | OUTPATIENT
Start: 2023-06-20 | End: 2023-06-22 | Stop reason: HOSPADM

## 2023-06-20 RX ORDER — TRANEXAMIC ACID 10 MG/ML
1000 INJECTION, SOLUTION INTRAVENOUS ONCE AS NEEDED
Status: DISCONTINUED | OUTPATIENT
Start: 2023-06-20 | End: 2023-06-22 | Stop reason: HOSPADM

## 2023-06-20 RX ORDER — LIDOCAINE HYDROCHLORIDE AND EPINEPHRINE 15; 5 MG/ML; UG/ML
INJECTION, SOLUTION EPIDURAL
Status: DISCONTINUED | OUTPATIENT
Start: 2023-06-20 | End: 2023-06-20

## 2023-06-20 RX ORDER — HYDROCODONE BITARTRATE AND ACETAMINOPHEN 10; 325 MG/1; MG/1
1 TABLET ORAL EVERY 4 HOURS PRN
Status: DISCONTINUED | OUTPATIENT
Start: 2023-06-20 | End: 2023-06-22 | Stop reason: HOSPADM

## 2023-06-20 RX ORDER — PROMETHAZINE HYDROCHLORIDE 12.5 MG/1
12.5 TABLET ORAL EVERY 6 HOURS PRN
Status: DISCONTINUED | OUTPATIENT
Start: 2023-06-20 | End: 2023-06-20

## 2023-06-20 RX ORDER — METHYLERGONOVINE MALEATE 0.2 MG/ML
200 INJECTION INTRAVENOUS
Status: DISCONTINUED | OUTPATIENT
Start: 2023-06-20 | End: 2023-06-22 | Stop reason: HOSPADM

## 2023-06-20 RX ORDER — OXYTOCIN 10 [USP'U]/ML
10 INJECTION, SOLUTION INTRAMUSCULAR; INTRAVENOUS ONCE AS NEEDED
Status: DISCONTINUED | OUTPATIENT
Start: 2023-06-20 | End: 2023-06-22 | Stop reason: HOSPADM

## 2023-06-20 RX ORDER — HYDROCODONE BITARTRATE AND ACETAMINOPHEN 5; 325 MG/1; MG/1
1 TABLET ORAL EVERY 4 HOURS PRN
Status: DISCONTINUED | OUTPATIENT
Start: 2023-06-20 | End: 2023-06-22 | Stop reason: HOSPADM

## 2023-06-20 RX ORDER — CARBOPROST TROMETHAMINE 250 UG/ML
250 INJECTION, SOLUTION INTRAMUSCULAR
Status: DISCONTINUED | OUTPATIENT
Start: 2023-06-20 | End: 2023-06-22 | Stop reason: HOSPADM

## 2023-06-20 RX ORDER — DIPHENHYDRAMINE HYDROCHLORIDE 50 MG/ML
12.5 INJECTION INTRAMUSCULAR; INTRAVENOUS EVERY 4 HOURS PRN
Status: DISCONTINUED | OUTPATIENT
Start: 2023-06-20 | End: 2023-06-20

## 2023-06-20 RX ORDER — MISOPROSTOL 200 UG/1
800 TABLET ORAL ONCE AS NEEDED
Status: DISCONTINUED | OUTPATIENT
Start: 2023-06-20 | End: 2023-06-22 | Stop reason: HOSPADM

## 2023-06-20 RX ORDER — DIPHENHYDRAMINE HYDROCHLORIDE 50 MG/ML
25 INJECTION INTRAMUSCULAR; INTRAVENOUS EVERY 4 HOURS PRN
Status: DISCONTINUED | OUTPATIENT
Start: 2023-06-20 | End: 2023-06-22 | Stop reason: HOSPADM

## 2023-06-20 RX ORDER — SODIUM CITRATE AND CITRIC ACID MONOHYDRATE 334; 500 MG/5ML; MG/5ML
30 SOLUTION ORAL ONCE
Status: DISCONTINUED | OUTPATIENT
Start: 2023-06-20 | End: 2023-06-20

## 2023-06-20 RX ORDER — ACETAMINOPHEN 325 MG/1
650 TABLET ORAL EVERY 6 HOURS PRN
Status: DISCONTINUED | OUTPATIENT
Start: 2023-06-20 | End: 2023-06-22 | Stop reason: HOSPADM

## 2023-06-20 RX ORDER — OXYTOCIN/RINGER'S LACTATE 30/500 ML
95 PLASTIC BAG, INJECTION (ML) INTRAVENOUS ONCE AS NEEDED
Status: DISCONTINUED | OUTPATIENT
Start: 2023-06-20 | End: 2023-06-22 | Stop reason: HOSPADM

## 2023-06-20 RX ORDER — DOCUSATE SODIUM 100 MG/1
200 CAPSULE, LIQUID FILLED ORAL 2 TIMES DAILY PRN
Status: DISCONTINUED | OUTPATIENT
Start: 2023-06-20 | End: 2023-06-22 | Stop reason: HOSPADM

## 2023-06-20 RX ORDER — ONDANSETRON 2 MG/ML
4 INJECTION INTRAMUSCULAR; INTRAVENOUS ONCE
Status: COMPLETED | OUTPATIENT
Start: 2023-06-20 | End: 2023-06-20

## 2023-06-20 RX ORDER — FENTANYL/BUPIVACAINE/NS/PF 2MCG/ML-.1
PLASTIC BAG, INJECTION (ML) INJECTION
Status: DISCONTINUED | OUTPATIENT
Start: 2023-06-20 | End: 2023-06-20

## 2023-06-20 RX ORDER — PRENATAL WITH FERROUS FUM AND FOLIC ACID 3080; 920; 120; 400; 22; 1.84; 3; 20; 10; 1; 12; 200; 27; 25; 2 [IU]/1; [IU]/1; MG/1; [IU]/1; MG/1; MG/1; MG/1; MG/1; MG/1; MG/1; UG/1; MG/1; MG/1; MG/1; MG/1
1 TABLET ORAL DAILY
Status: DISCONTINUED | OUTPATIENT
Start: 2023-06-20 | End: 2023-06-22 | Stop reason: HOSPADM

## 2023-06-20 RX ORDER — OXYTOCIN/RINGER'S LACTATE 30/500 ML
334 PLASTIC BAG, INJECTION (ML) INTRAVENOUS ONCE AS NEEDED
Status: DISCONTINUED | OUTPATIENT
Start: 2023-06-20 | End: 2023-06-22 | Stop reason: HOSPADM

## 2023-06-20 RX ORDER — FAMOTIDINE 20 MG/1
20 TABLET, FILM COATED ORAL ONCE
Status: COMPLETED | OUTPATIENT
Start: 2023-06-20 | End: 2023-06-20

## 2023-06-20 RX ORDER — DIPHENHYDRAMINE HCL 25 MG
25 CAPSULE ORAL EVERY 4 HOURS PRN
Status: DISCONTINUED | OUTPATIENT
Start: 2023-06-20 | End: 2023-06-22 | Stop reason: HOSPADM

## 2023-06-20 RX ORDER — HYDROCORTISONE 25 MG/G
CREAM TOPICAL 3 TIMES DAILY PRN
Status: DISCONTINUED | OUTPATIENT
Start: 2023-06-20 | End: 2023-06-22 | Stop reason: HOSPADM

## 2023-06-20 RX ORDER — FAMOTIDINE 10 MG/ML
20 INJECTION INTRAVENOUS ONCE
Status: DISCONTINUED | OUTPATIENT
Start: 2023-06-20 | End: 2023-06-20

## 2023-06-20 RX ORDER — FENTANYL/BUPIVACAINE/NS/PF 2MCG/ML-.1
PLASTIC BAG, INJECTION (ML) INJECTION
Status: COMPLETED
Start: 2023-06-20 | End: 2023-06-20

## 2023-06-20 RX ORDER — SIMETHICONE 80 MG
1 TABLET,CHEWABLE ORAL EVERY 6 HOURS PRN
Status: DISCONTINUED | OUTPATIENT
Start: 2023-06-20 | End: 2023-06-22 | Stop reason: HOSPADM

## 2023-06-20 RX ORDER — LABETALOL 100 MG/1
100 TABLET, FILM COATED ORAL EVERY 12 HOURS
Status: DISCONTINUED | OUTPATIENT
Start: 2023-06-20 | End: 2023-06-22 | Stop reason: HOSPADM

## 2023-06-20 RX ORDER — OXYTOCIN/RINGER'S LACTATE 30/500 ML
95 PLASTIC BAG, INJECTION (ML) INTRAVENOUS ONCE
Status: DISCONTINUED | OUTPATIENT
Start: 2023-06-20 | End: 2023-06-22 | Stop reason: HOSPADM

## 2023-06-20 RX ORDER — FENTANYL/BUPIVACAINE/NS/PF 2MCG/ML-.1
PLASTIC BAG, INJECTION (ML) INJECTION CONTINUOUS
Status: DISCONTINUED | OUTPATIENT
Start: 2023-06-20 | End: 2023-06-20

## 2023-06-20 RX ORDER — ONDANSETRON 8 MG/1
8 TABLET, ORALLY DISINTEGRATING ORAL EVERY 8 HOURS PRN
Status: DISCONTINUED | OUTPATIENT
Start: 2023-06-20 | End: 2023-06-22 | Stop reason: HOSPADM

## 2023-06-20 RX ORDER — SODIUM CHLORIDE 0.9 % (FLUSH) 0.9 %
10 SYRINGE (ML) INJECTION
Status: DISCONTINUED | OUTPATIENT
Start: 2023-06-20 | End: 2023-06-22 | Stop reason: HOSPADM

## 2023-06-20 RX ORDER — ONDANSETRON 2 MG/ML
INJECTION INTRAMUSCULAR; INTRAVENOUS
Status: COMPLETED
Start: 2023-06-20 | End: 2023-06-20

## 2023-06-20 RX ADMIN — Medication 8 ML/HR: at 01:06

## 2023-06-20 RX ADMIN — FAMOTIDINE 20 MG: 20 TABLET, FILM COATED ORAL at 05:06

## 2023-06-20 RX ADMIN — LIDOCAINE HYDROCHLORIDE,EPINEPHRINE BITARTRATE 4 ML: 15; .005 INJECTION, SOLUTION EPIDURAL; INFILTRATION; INTRACAUDAL; PERINEURAL at 01:06

## 2023-06-20 RX ADMIN — SODIUM CHLORIDE, POTASSIUM CHLORIDE, SODIUM LACTATE AND CALCIUM CHLORIDE: 600; 310; 30; 20 INJECTION, SOLUTION INTRAVENOUS at 10:06

## 2023-06-20 RX ADMIN — PROMETHAZINE HYDROCHLORIDE 12.5 MG: 12.5 TABLET ORAL at 08:06

## 2023-06-20 RX ADMIN — ONDANSETRON 4 MG: 2 INJECTION INTRAMUSCULAR; INTRAVENOUS at 02:06

## 2023-06-20 RX ADMIN — DOCUSATE SODIUM 200 MG: 100 CAPSULE, LIQUID FILLED ORAL at 09:06

## 2023-06-20 RX ADMIN — HYDROCORTISONE: 25 CREAM TOPICAL at 06:06

## 2023-06-20 RX ADMIN — Medication 7 ML: at 01:06

## 2023-06-20 RX ADMIN — LABETALOL HYDROCHLORIDE 100 MG: 100 TABLET, FILM COATED ORAL at 08:06

## 2023-06-20 RX ADMIN — FERROUS SULFATE TAB 325 MG (65 MG ELEMENTAL FE) 1 EACH: 325 (65 FE) TAB at 08:06

## 2023-06-20 RX ADMIN — HYDROCODONE BITARTRATE AND ACETAMINOPHEN 1 TABLET: 5; 325 TABLET ORAL at 06:06

## 2023-06-20 RX ADMIN — IBUPROFEN 600 MG: 600 TABLET, FILM COATED ORAL at 06:06

## 2023-06-20 RX ADMIN — LABETALOL HYDROCHLORIDE 100 MG: 100 TABLET, FILM COATED ORAL at 09:06

## 2023-06-20 NOTE — CARE UPDATE
Dr. Almanza at bedside at 1130 to assess patient's pushing/progress s/p 1hr of pushing. Pushing to +1/+2 station. FHT baseline 140s, moderate variability, decels to 90s for <1min s/p pushing, however recovers to baseline with moderate variability thereafter. Pitocin currently off. Will continue to monitor closely. Patient counseled on possible operative vaginal delivery. Anticipate  if maternal-fetal status remains reassuring.

## 2023-06-20 NOTE — CARE UPDATE
To bedside, patient with N/V and increased pressure. SVE 10/100/+1. Staff/team notified. Patient off pitocin since 830, FHT overall reassuring with rare late decels. Will set up room to start pushing. Anticipate .

## 2023-06-20 NOTE — PROGRESS NOTES
LABOR NOTE    Resident to bedside for routine cervical check.    S:  Complaints: no.  Epidural working:  yes    O: BP (!) 113/58   Pulse 93   Temp 98.1 °F (36.7 °C) (Oral)   Resp 18   LMP 2022 (Exact Date)   SpO2 97%       FHT: Cat 2 (reassuring), baseline 135, mod BTBV, + accels, occasional late decels  CTX: q 2-3 minutes  SVE: 70/-2, IUPC      ASSESSMENT:   32 y.o.  at 38w6d, IOL    FHT reassuring    Active Hospital Problems    Diagnosis  POA    *Encounter for induction of labor [Z34.90]  Not Applicable      Resolved Hospital Problems   No resolved problems to display.       TIMELINE:  1245:/-4, bello balloon placed  1345: cytotec given  1745: 70/-2, s/p balloon  2315: 570/-2, pit @8 mU/min  0300: 570/-2, AROM clr, pit @2 mU/min  0500: 570/-2, IUPC, pit @4 mU/min    PLAN:  Continue Close Maternal/Fetal Monitoring  Pitocin Augmentation per protocol  Recheck 4 hours or PRN    Vivi Bhat M.D.  OB/GYN PGY-3

## 2023-06-20 NOTE — ANESTHESIA PROCEDURE NOTES
Epidural    Patient location during procedure: OB   Reason for block: primary anesthetic   Reason for block: labor analgesia requested by patient and obstetrician  Diagnosis: iup   Start time: 6/20/2023 1:00 AM  Timeout: 6/20/2023 1:00 AM  End time: 6/20/2023 1:28 AM  Surgery related to: Vaginal Delivery    Staffing  Performing Provider: Raj Skaggs MD  Authorizing Provider: Bonnie Almeida MD        Preanesthetic Checklist  Completed: patient identified, IV checked, site marked, risks and benefits discussed, surgical consent, monitors and equipment checked, pre-op evaluation, timeout performed, anesthesia consent given, hand hygiene performed and patient being monitored  Preparation  Patient position: sitting  Prep: ChloraPrep  Patient monitoring: Pulse Ox  Reason for block: primary anesthetic   Epidural  Skin Anesthetic: lidocaine 1%  Skin Wheal: 3 mL  Administration type: continuous  Approach: midline  Interspace: L3-4    Injection technique: MANN saline  Needle and Epidural Catheter  Needle type: Tuohy   Needle gauge: 17  Needle length: 3.5 inches  Needle insertion depth: 8 cm  Catheter type: springwRetentionGrid  Catheter size: 19 G  Catheter at skin depth: 13 cm  Insertion Attempts: 1  Test dose: 3 mL of lidocaine 1.5% with Epi 1-to-200,000  Additional Documentation: incremental injection, negative aspiration for heme and CSF, no paresthesia on injection, no signs/symptoms of IV or SA injection, no significant pain on injection and no significant complaints from patient  Needle localization: anatomical landmarks  Medications:  Volume per aspiration: 5 mL  Time between aspirations: 5 minutes   Assessment  Ease of block: easy  Patient's tolerance of the procedure: comfortable throughout block and no complaints No inadvertent dural puncture with Tuohy.  Dural puncture not performed with spinal needle

## 2023-06-20 NOTE — PLAN OF CARE
Problem: Adult Inpatient Plan of Care  Goal: Plan of Care Review  Outcome: Ongoing, Progressing  Goal: Patient-Specific Goal (Individualized)  Outcome: Ongoing, Progressing  Goal: Absence of Hospital-Acquired Illness or Injury  Outcome: Ongoing, Progressing  Goal: Optimal Comfort and Wellbeing  Outcome: Ongoing, Progressing  Goal: Readiness for Transition of Care  Outcome: Ongoing, Progressing     Problem: Bleeding (Labor)  Goal: Hemostasis  Outcome: Ongoing, Progressing     Problem: Change in Fetal Wellbeing (Labor)  Goal: Stable Fetal Wellbeing  Outcome: Ongoing, Progressing     Problem: Delayed Labor Progression (Labor)  Goal: Effective Progression to Delivery  Outcome: Ongoing, Progressing     Problem: Infection (Labor)  Goal: Absence of Infection Signs and Symptoms  Outcome: Ongoing, Progressing     Problem: Labor Pain (Labor)  Goal: Acceptable Pain Control  Outcome: Ongoing, Progressing     Problem: Uterine Tachysystole (Labor)  Goal: Normal Uterine Contraction Pattern  Outcome: Ongoing, Progressing

## 2023-06-20 NOTE — PROGRESS NOTES
LABOR NOTE    Resident to bedside for routine cervical check.    S:  Complaints: Reports contractions are getting more painful.  Epidural working:  not applicable    O: /76   Pulse 67   Temp 98.2 °F (36.8 °C) (Oral)   Resp 18   LMP 2022 (Exact Date)   SpO2 98%       FHT: Cat 1 (reassuring), baseline 135, mod BTBV, + accels, - decels  CTX: q 2-3 minutes  SVE: -2      ASSESSMENT:   32 y.o.  at 38w6d, IOL    FHT reassuring    Active Hospital Problems    Diagnosis  POA    *Encounter for induction of labor [Z34.90]  Not Applicable      Resolved Hospital Problems   No resolved problems to display.       TIMELINE:  1245:/-4, bello balloon placed  1345: cytotec given  1745: -2, s/p balloon  2315: -2, pit @8 mU/min    PLAN:  Patient declines AROM at this time. Wants to discuss further at next check  Continue Close Maternal/Fetal Monitoring  Pitocin Augmentation per protocol  Recheck 4 hours or PRN    Vivi Bhat M.D.  OB/GYN PGY-3

## 2023-06-20 NOTE — L&D DELIVERY NOTE
[facility] - Labor & Delivery  Vaginal Delivery   Operative Note    SUMMARY     Normal spontaneous vaginal delivery of live infant after approximately 2.5 hours of maternal pushing effort. Infant delivered in OA presentation. No nuchal cord was noted with delivery. Infant was placed on mothers abdomen for skin to skin and bulb suctioning performed. Delayed cord clamping was performed. Cord was cut and clamped and cord blood was collected.    Spontaneous delivery of placenta with gentle traction applied. IV pitocin was given noting good uterine tone. No trailing membranes were noted on bimanual examination. Placenta was inspected and noted to be intact.    2nd degree laceration noted and repaired in normal fashion with 2-0 and 3-0 vicryl .    Patient tolerated delivery well. Sponge, needle, and lap counted correctly x2. QBL 271cc.    Eva De La Torre MD   OB/GYN PGY1  [facility]      Indications:  (spontaneous vaginal delivery)  Pregnancy complicated by:   Patient Active Problem List   Diagnosis    Lumbar back pain with radiculopathy affecting lower extremity    Chiari malformation type I    Elevated liver enzymes    Anxiety    Chronic hypertension affecting pregnancy     (spontaneous vaginal delivery)     Admitting GA: 39w0d    Delivery Information for Amber Wilkins    Birth information:  YOB: 2022   Time of birth: 1:06 PM   Sex: female   Head Delivery Date/Time: 2023  1:06 PM   Delivery type: Vaginal, Spontaneous   Gestational Age: 39w0d        Delivery Providers    Delivering clinician: Vivienne Almanza MD   Provider Role    Eva De La Torre MD Resident    Mercedes Espinosa, RN Registered Nurse    Jamaica Morris, RN Charge Nurse    Tanisha Slaughter RN Registered Nurse              Measurements    Weight:   Length:          Apgars    Living status:   Apgars:  1 min.:  5 min.:  10 min.:  15 min.:  20 min.:    Skin color:         Heart rate:         Reflex irritability:          Muscle tone:         Respiratory effort:         Total:                  Operative Delivery    Forceps attempted?: No  Vacuum extractor attempted?: No         Shoulder Dystocia    Shoulder dystocia present?: No           Presentation    Presentation: Vertex  Position: Left Occiput Anterior           Interventions/Resuscitation    Method: Bulb Suctioning, Tactile Stimulation       Cord    Vessels: 3 vessels  Complications: None  Delayed Cord Clamping?: Yes  Cord Clamped Date/Time: 2023  1:08 PM  Cord Blood Disposition: Sent with Baby  Gases Sent?: No  Stem Cell Collection (by MD): No       Placenta    Placenta delivery date/time: 2023 1310  Placenta removal: Spontaneous  Placenta appearance: Intact  Placenta disposition: Discarded           Labor Events:       labor: No     Labor Onset Date/Time:         Dilation Complete Date/Time:         Start Pushing Date/Time:         Start Pushing Date/Time:       Rupture Date/Time: 23  030         Rupture type: ARM (Artificial Rupture)         Fluid Amount:       Fluid Color: Clear               steroids: None     Antibiotics given for GBS: No     Induction: balloon dilation (Gonzalez);misoprostol     Indications for induction:  Hypertension     Augmentation: oxytocin     Indications for augmentation: Ineffective Contraction Pattern     Labor complications: None     Additional complications:          Cervical ripening:                     Delivery:      Episiotomy: None     Indication for Episiotomy:       Perineal Lacerations: 2nd Repaired:  Yes   Periurethral Laceration:   Repaired:     Labial Laceration:   Repaired:     Sulcus Laceration:   Repaired:     Vaginal Laceration:   Repaired:     Cervical Laceration:   Repaired:     Repair suture:       Repair # of packets: 2     Last Value - EBL - Nursing (mL):       Sum - EBL - Nursing (mL): 0     Last Value - EBL - Anesthesia (mL):      Calculated QBL (mL): 271      Vaginal Sweep Performed: Yes      Surgicount Correct: Yes     Vaginal Packing: No Quantity:       Other providers:       Anesthesia    Method: Epidural          Details (if applicable):  Trial of Labor      Categorization:      Priority:     Indications for :     Incision Type:       Additional  information:  Forceps:    Vacuum:    Breech:    Observed anomalies    Other (Comments):

## 2023-06-20 NOTE — PROGRESS NOTES
LABOR NOTE    Resident to bedside for routine cervical check.    S:  Complaints: no.  Epidural working:  yes    O: /63   Pulse 79   Temp 98.2 °F (36.8 °C) (Oral)   Resp 18   LMP 2022 (Exact Date)   SpO2 99%       FHT: Cat 1 (reassuring), baseline 135, mod BTBV, + accels, - decels  CTX: q 2-3 minutes  SVE: 570/-2, AROM clr      ASSESSMENT:   32 y.o.  at 38w6d, IOL    FHT reassuring    Active Hospital Problems    Diagnosis  POA    *Encounter for induction of labor [Z34.90]  Not Applicable      Resolved Hospital Problems   No resolved problems to display.       TIMELINE:  1245:50/-4, bello balloon placed  1345: cytotec given  1745: 70/-2, s/p balloon  2315: 5/70/-2, pit @8 mU/min  0300: 5/70/-2, AROM clr, pit @2 mU/min    PLAN:  Continue Close Maternal/Fetal Monitoring  Pitocin Augmentation per protocol  Recheck 4 hours or PRN    Vivi Bhat M.D.  OB/GYN PGY-3

## 2023-06-20 NOTE — LACTATION NOTE
Lactation visited pt. Breastfeeding basics reviewed. Latch assistance given. Baby latched after a few attempts with good pulls and tug, occasional swallows noted.Pt encouraged to feed the baby 8 or more times in 24hrs on cue until content. Pt encouraged to ask for latch assistance as needed.    06/20/23 1750   Maternal Assessment   Breast Shape Bilateral:;wide   Breast Density Bilateral:;soft   Areola Bilateral:;elastic   Nipples Bilateral:;short;graspable   Maternal Infant Feeding   Maternal Emotional State assist needed   Infant Positioning clutch/football   Signs of Milk Transfer audible swallow;infant jaw motion present   Comfort Measures Before/During Feeding infant position adjusted;latch adjusted;maternal position adjusted;suction broken using finger   Nipple Shape After Feeding, Left round   Nipple Shape After Feeding, Right round   Latch Assistance yes   Community Referrals   Community Referrals support group        dentures none/dentures

## 2023-06-20 NOTE — CARE UPDATE
LATE ENTRY:   Resident to bedside for recurrent late decelerations. SVE 9/90/-1. Pit 4 mU/min, paused. Will give IVF bolus. Patient repositioned with response to baseline.     Keira Velazquez MD/MPH  OB/GYN PGY-2  [facility]

## 2023-06-21 LAB
BASOPHILS # BLD AUTO: 0.04 K/UL (ref 0–0.2)
BASOPHILS NFR BLD: 0.3 % (ref 0–1.9)
DIFFERENTIAL METHOD: ABNORMAL
EOSINOPHIL # BLD AUTO: 0.2 K/UL (ref 0–0.5)
EOSINOPHIL NFR BLD: 1.6 % (ref 0–8)
ERYTHROCYTE [DISTWIDTH] IN BLOOD BY AUTOMATED COUNT: 14.7 % (ref 11.5–14.5)
HCT VFR BLD AUTO: 27.6 % (ref 37–48.5)
HGB BLD-MCNC: 8.7 G/DL (ref 12–16)
IMM GRANULOCYTES # BLD AUTO: 0.2 K/UL (ref 0–0.04)
IMM GRANULOCYTES NFR BLD AUTO: 1.3 % (ref 0–0.5)
LYMPHOCYTES # BLD AUTO: 2 K/UL (ref 1–4.8)
LYMPHOCYTES NFR BLD: 13.5 % (ref 18–48)
MCH RBC QN AUTO: 28.4 PG (ref 27–31)
MCHC RBC AUTO-ENTMCNC: 31.5 G/DL (ref 32–36)
MCV RBC AUTO: 90 FL (ref 82–98)
MONOCYTES # BLD AUTO: 1.2 K/UL (ref 0.3–1)
MONOCYTES NFR BLD: 8.1 % (ref 4–15)
NEUTROPHILS # BLD AUTO: 11.2 K/UL (ref 1.8–7.7)
NEUTROPHILS NFR BLD: 75.2 % (ref 38–73)
NRBC BLD-RTO: 0 /100 WBC
PLATELET # BLD AUTO: 159 K/UL (ref 150–450)
PMV BLD AUTO: 11.8 FL (ref 9.2–12.9)
RBC # BLD AUTO: 3.06 M/UL (ref 4–5.4)
WBC # BLD AUTO: 14.86 K/UL (ref 3.9–12.7)

## 2023-06-21 PROCEDURE — 36415 COLL VENOUS BLD VENIPUNCTURE: CPT

## 2023-06-21 PROCEDURE — 25000003 PHARM REV CODE 250

## 2023-06-21 PROCEDURE — 85025 COMPLETE CBC W/AUTO DIFF WBC: CPT

## 2023-06-21 PROCEDURE — 11000001 HC ACUTE MED/SURG PRIVATE ROOM

## 2023-06-21 RX ORDER — DOCUSATE SODIUM 100 MG/1
100 CAPSULE, LIQUID FILLED ORAL DAILY
Qty: 60 CAPSULE | Refills: 2 | Status: SHIPPED | OUTPATIENT
Start: 2023-06-22 | End: 2023-06-21 | Stop reason: SDUPTHER

## 2023-06-21 RX ORDER — ACETAMINOPHEN 325 MG/1
650 TABLET ORAL EVERY 6 HOURS PRN
Qty: 60 TABLET | Refills: 2 | Status: SHIPPED | OUTPATIENT
Start: 2023-06-21 | End: 2023-10-12

## 2023-06-21 RX ORDER — LANOLIN ALCOHOL/MO/W.PET/CERES
1 CREAM (GRAM) TOPICAL DAILY
Status: DISCONTINUED | OUTPATIENT
Start: 2023-06-21 | End: 2023-06-22 | Stop reason: HOSPADM

## 2023-06-21 RX ORDER — ACETAMINOPHEN 325 MG/1
650 TABLET ORAL EVERY 6 HOURS PRN
Qty: 60 TABLET | Refills: 2 | Status: SHIPPED | OUTPATIENT
Start: 2023-06-21 | End: 2023-06-21 | Stop reason: SDUPTHER

## 2023-06-21 RX ORDER — DOCUSATE SODIUM 100 MG/1
100 CAPSULE, LIQUID FILLED ORAL DAILY
Status: DISCONTINUED | OUTPATIENT
Start: 2023-06-21 | End: 2023-06-22 | Stop reason: HOSPADM

## 2023-06-21 RX ORDER — IBUPROFEN 600 MG/1
600 TABLET ORAL EVERY 6 HOURS
Qty: 60 TABLET | Refills: 2 | Status: SHIPPED | OUTPATIENT
Start: 2023-06-21 | End: 2023-06-21 | Stop reason: SDUPTHER

## 2023-06-21 RX ORDER — DOCUSATE SODIUM 100 MG/1
100 CAPSULE, LIQUID FILLED ORAL DAILY
Qty: 60 CAPSULE | Refills: 2 | Status: SHIPPED | OUTPATIENT
Start: 2023-06-22 | End: 2023-10-12

## 2023-06-21 RX ORDER — IBUPROFEN 600 MG/1
600 TABLET ORAL EVERY 6 HOURS
Qty: 60 TABLET | Refills: 2 | Status: SHIPPED | OUTPATIENT
Start: 2023-06-21 | End: 2023-10-12

## 2023-06-21 RX ADMIN — DOCUSATE SODIUM 100 MG: 100 CAPSULE, LIQUID FILLED ORAL at 09:06

## 2023-06-21 RX ADMIN — HYDROCODONE BITARTRATE AND ACETAMINOPHEN 1 TABLET: 10; 325 TABLET ORAL at 08:06

## 2023-06-21 RX ADMIN — IBUPROFEN 600 MG: 600 TABLET, FILM COATED ORAL at 12:06

## 2023-06-21 RX ADMIN — IBUPROFEN 600 MG: 600 TABLET, FILM COATED ORAL at 11:06

## 2023-06-21 RX ADMIN — LABETALOL HYDROCHLORIDE 100 MG: 100 TABLET, FILM COATED ORAL at 08:06

## 2023-06-21 RX ADMIN — IBUPROFEN 600 MG: 600 TABLET, FILM COATED ORAL at 05:06

## 2023-06-21 RX ADMIN — PRENATAL VIT W/ FE FUMARATE-FA TAB 27-0.8 MG 1 TABLET: 27-0.8 TAB at 09:06

## 2023-06-21 RX ADMIN — FERROUS SULFATE TAB 325 MG (65 MG ELEMENTAL FE) 1 EACH: 325 (65 FE) TAB at 09:06

## 2023-06-21 RX ADMIN — LABETALOL HYDROCHLORIDE 100 MG: 100 TABLET, FILM COATED ORAL at 09:06

## 2023-06-21 RX ADMIN — HYDROCODONE BITARTRATE AND ACETAMINOPHEN 1 TABLET: 10; 325 TABLET ORAL at 09:06

## 2023-06-21 NOTE — PLAN OF CARE
Pt ambulating and voiding without difficulty. Patient safety maintained, side rails up x2, bed low and locked position.  Pain well controlled with scheduled pain medication. Fundus midline, firm, with moderate lochia. VSS. Significant other at bedside; parents responding to infant cues and bonding appropriately.

## 2023-06-21 NOTE — PLAN OF CARE
VSS. Patient ambulating and voiding independently and without difficulty. Fundus firm without massage, midline, with light rubra noted. Pain controlled with PRN pain medications. Safety maintained, bed low and in a locked position. Significant other at bedside. Pumping every 2-3 hours and supplementing with donor breast milk. No further concerns at this time, will continue to monitor.

## 2023-06-21 NOTE — PROGRESS NOTES
Depression education given with handout, pt states understanding and will follow up with therapist

## 2023-06-21 NOTE — LACTATION NOTE
Presdohony pump, tubing, collections containers and labels brought to bedside.  Discussed proper pump setting of initiation phase.  Instructed on proper usage of pump and to adjust suction according to maximum comfort level.  Verified appropriate flange fit.  Educated on the frequency and duration of pumping in order to promote and maintain a full milk supply.  Hands on pumping technique reviewed.  Encouraged hand expression after pumping.  Instructed on cleaning of breast pump parts.  Written instructions also given.  Pt verbalized understanding and appropriate recall for proper milk handling, collection, labeling, storage and transportation.      Baby Led Bottle Feeding education   Wash your hands.  Feed Baby on cue, not on a schedule. Babies give cues when ready to feed. Cues are soft sounds like grunts, moving arms and leg, licking lips, turning head to the side with an open mouth, and sucking hands/fingers.  Hold baby skin to skin during feedings. Look into babys eyes, talk to baby, and stroke baby while baby suckles.  Baby should be fed 8 or more times a day depending on babys cues. Some babies may be sleepy and may need to be awakened for their feeds to get the 8 feeds a day needed.  Hold the baby close while feeding and never prop a bottle.  Hold baby upright supporting head and neck.  Place the tip of nipple below babys nose, rubbing top lip and allow baby to open mouth and accept the nipple.  Hold the bottle horizontally, (level with the ground), tilt the bottle just enough to get milk in the nipple.  Watch for stress cues during feeding. Be alert for baby wrinkling eyebrow, baby turning head away from bottle, or getting fussy. Baby may need a break.  Once baby releases nipple or pulls away, do not force baby to finish bottle. Baby is full when sucks slow or stops, arms relax, turns away from nipple, pushes away or falls asleep.  Pace the feeding, feed slowly so that baby is given 15-20 minutes  with breaks to burp every 10-15mls.  Alternate arms part way through feeding to allow stimulation to both babys eyes.  Use formula within one hour of starting feeding. Throw away left over formula.    Mother able to demonstrate baby led bottlefeeding

## 2023-06-21 NOTE — LACTATION NOTE
Lactation visited pt. Baby was ready for a feeding. Assisted pt to latch baby to the right breast in cross cradle hold. Initial latch was tender but within about 15-30 secs the pain decreased, baby chewy at first but became rhythmic with breast compression. After 15min baby unlatched herself and pt switched the baby to the left breast in football hold. Initially, uncomfortable but improved after a few seconds. Pt given hydrogel to wear after the feeding for tenderness,educated on the use of the hydrogels.Baby placed skin to skin after the feeding. Discussed with pt her level of tenderness. She would like a plan if the tenderness increases. She decided if the nipples were too painful to latch the baby, she will supplement with DM and request a pump for breast milk protection and stimulate milk production. Pt encouraged to call for assistance with the latch as needed.    06/21/23 1230   Maternal Assessment   Breast Shape Bilateral:;wide   Breast Density Bilateral:;soft   Areola Bilateral:;elastic   Nipples Bilateral:;everted   Left Nipple Symptoms tender;abraded;bruised   Right Nipple Symptoms tender;bruised   Maternal Infant Feeding   Maternal Emotional State assist needed   Infant Positioning clutch/football;cross-cradle   Signs of Milk Transfer infant jaw motion present;audible swallow   Comfort Measures Before/During Feeding infant position adjusted;latch adjusted;maternal position adjusted;suction broken using finger   Comfort Measures Following Feeding expressed milk applied  (hydrogels)   Nipple Shape After Feeding, Left round   Nipple Shape After Feeding, Right round   Latch Assistance yes

## 2023-06-21 NOTE — PROGRESS NOTES
POSTPARTUM PROGRESS NOTE    Subjective:     PPD/POD#: 1   Procedure:    EGA: 39w0d   N/V: No   F/C: No   Abd Pain: Mild, well-controlled with oral pain medication   Lochia: Mild   Voiding: Yes   Ambulating: Yes   Bowel fnc: Yes   Breastfeeding: Yes   Contraception: Per primary OB   Circumcision: N/A, female     Objective:      Temp:  [97.4 °F (36.3 °C)-99.4 °F (37.4 °C)] 98 °F (36.7 °C)  Pulse:  [] 77  Resp:  [16-18] 18  SpO2:  [96 %-99 %] 96 %  BP: (101-135)/(54-74) 135/73    Lung: Normal respiratory effort   Abdomen: Soft, appropriately tender   Uterus: Firm, no fundal tenderness   Incision: N/A   : Deferred   Extremities: Bilateral trace edema     Lab Review    Recent Labs   Lab 23  1236      K 4.4      CO2 19*   BUN 12   CREATININE 0.8   GLU 78   PROT 6.6   BILITOT 0.1   ALKPHOS 108   ALT 42   AST 27       Recent Labs   Lab 23  1236 23  0512   WBC 12.27 14.86*   HGB 11.5* 8.7*   HCT 36.4* 27.6*   MCV 91 90    159         I/O    Intake/Output Summary (Last 24 hours) at 2023 0716  Last data filed at 2023 0200  Gross per 24 hour   Intake --   Output 1691 ml   Net -1691 ml        Assessment and Plan:   Postpartum care:  - Patient doing well.  - Continue routine management and advances.    Anemia   - H/H as above  - QBL: 200 cc  - asymptomatic  - iron/colace    cHTN  - BP as above  - asymptomatic  - preE labs as above  - UOP: 0.73 cc/kg/hr  - Mag: not indicated  - Hypertensive agent: continue labetalol 100 BID    Obesity  - Prepreg BMI 33  - current BMI 43  - encourage ambulation  - SCDs while in bed     Anxiety  - significant HARVEY  - not on meds  - will need pp mood check    Eder Velásquez MD PGY-1  Obstetrics and Gynecology

## 2023-06-21 NOTE — ANESTHESIA POSTPROCEDURE EVALUATION
Anesthesia Post Evaluation    Patient: Vannesa Wilkins    Procedure(s) Performed: * No procedures listed *    Final Anesthesia Type: epidural      Patient location during evaluation: labor & delivery  Patient participation: Yes- Able to Participate  Level of consciousness: awake and alert  Post-procedure vital signs: reviewed and stable  Pain management: adequate  Airway patency: patent  SHERIF mitigation strategies: Multimodal analgesia  PONV status at discharge: No PONV  Anesthetic complications: no      Cardiovascular status: blood pressure returned to baseline  Respiratory status: unassisted and spontaneous ventilation  Follow-up not needed.          Vitals Value Taken Time   /73 06/21/23 0445   Temp 36.7 °C (98 °F) 06/21/23 0445   Pulse 77 06/21/23 0445   Resp 18 06/21/23 0445   SpO2 96 % 06/21/23 0445         No case tracking events are documented in the log.      Pain/Yuan Score: Pain Rating Prior to Med Admin: 6 (6/21/2023  5:05 AM)

## 2023-06-22 ENCOUNTER — PATIENT MESSAGE (OUTPATIENT)
Dept: FAMILY MEDICINE | Facility: CLINIC | Age: 33
End: 2023-06-22
Payer: OTHER GOVERNMENT

## 2023-06-22 ENCOUNTER — PATIENT MESSAGE (OUTPATIENT)
Dept: LACTATION | Facility: CLINIC | Age: 33
End: 2023-06-22
Payer: OTHER GOVERNMENT

## 2023-06-22 VITALS
RESPIRATION RATE: 19 BRPM | DIASTOLIC BLOOD PRESSURE: 77 MMHG | SYSTOLIC BLOOD PRESSURE: 129 MMHG | HEART RATE: 81 BPM | OXYGEN SATURATION: 97 % | TEMPERATURE: 98 F

## 2023-06-22 PROCEDURE — 25000003 PHARM REV CODE 250

## 2023-06-22 RX ADMIN — PRENATAL VIT W/ FE FUMARATE-FA TAB 27-0.8 MG 1 TABLET: 27-0.8 TAB at 08:06

## 2023-06-22 RX ADMIN — DOCUSATE SODIUM 200 MG: 100 CAPSULE, LIQUID FILLED ORAL at 08:06

## 2023-06-22 RX ADMIN — IBUPROFEN 600 MG: 600 TABLET, FILM COATED ORAL at 10:06

## 2023-06-22 RX ADMIN — IBUPROFEN 600 MG: 600 TABLET, FILM COATED ORAL at 05:06

## 2023-06-22 RX ADMIN — LABETALOL HYDROCHLORIDE 100 MG: 100 TABLET, FILM COATED ORAL at 08:06

## 2023-06-22 RX ADMIN — FERROUS SULFATE TAB 325 MG (65 MG ELEMENTAL FE) 1 EACH: 325 (65 FE) TAB at 08:06

## 2023-06-22 RX ADMIN — IBUPROFEN 600 MG: 600 TABLET, FILM COATED ORAL at 12:06

## 2023-06-22 NOTE — LACTATION NOTE
06/22/23 1401   Breasts WDL   Breast WDL WDL   Left Nipple Symptoms cracked;abraded   Right Nipple Symptoms abraded;cracked   Breast Pumping   Breast Pumping Interventions post-feed pumping encouraged  (pumping for next 48 hours to  rest nipples)   Maternal Feeding Assessment   Maternal Emotional State assist needed   Infant Positioning clutch/football   Latch Assistance yes  (Will pump and bottle for 48 hours to rest nipples)   Reproductive Interventions   Breast Care: Breastfeeding open to air;Hydrogel dressing applied   Breastfeeding Assistance feeding cue recognition promoted;feeding on demand promoted;support offered;supplemental feeding provided   Breastfeeding Support maternal rest encouraged;maternal nutrition promoted;maternal hydration promoted;lactation counseling provided;infant-mother separation minimized;encouragement provided;diary/feeding log utilized     Lactation rounds. Discharge education reviewed. Pt's nipples are cracked and abraded. Latch pain score of 9-10 with initial latch , Pt fears latching.   Plan: will rest nipples for the next 48 hours. Pump 8 or more in 24 hours, offer EBM obtained and follow with formula. Use hydrogels.   Once nipples are healing and skin is intact, attempt to latch infant . Continue to pump afterwards , offer supplements as needed.   Follow up with outpatient speech Language as recommended by LC and Pediatrics.   [facility] for pre and post feed weight and latch assistance schedule for 7-6-2023

## 2023-06-22 NOTE — DISCHARGE SUMMARY
Delivery Discharge Summary  Obstetrics      Primary OB Clinician: Vivienne Almanza MD      Admission date: 2023  Discharge date: 2023    Disposition: To home, self care    Discharge Diagnosis List:      Patient Active Problem List   Diagnosis    Lumbar back pain with radiculopathy affecting lower extremity    Chiari malformation type I    Elevated liver enzymes    Anxiety    Chronic hypertension affecting pregnancy     (spontaneous vaginal delivery)       Procedure:     Hospital Course:  Vannesa Wilkins is a 32 y.o. now , PPD #2 who was admitted on 2023 at 39w0d for IOL. Patient was subsequently admitted to labor and delivery unit with signed consents. Pregnancy complicated by cHTN (on labetalol 100 BID), obesity, uterine fibroids.    Labor course was uncomplicated and resulted in  without complications.     Please see delivery note for further details. Her postpartum course was uncomplicated. On discharge day, patient's pain is controlled with oral pain medications. Pt is tolerating ambulation without SOB or CP, and regular diet without N/V. Reports lochia is mild. Denies any HA, vision changes, F/C, LE swelling. Denies any breast pain/soreness.    Pt in stable condition and ready for discharge. She has been instructed to start and/or continue medications and follow up with her obstetrics provider as listed below.    Pertinent studies:  CBC  Recent Labs   Lab 23  1236 23  0512   WBC 12.27 14.86*   HGB 11.5* 8.7*   HCT 36.4* 27.6*   MCV 91 90    159          Immunization History   Administered Date(s) Administered    COVID-19, MRNA, LN-S, PF (Pfizer) (Purple Cap) 03/10/2021, 2021, 2021    COVID-19, mRNA, LNP-S, bivalent booster, PF (PFIZER OMICRON) 2022        Delivery:    Episiotomy: None   Lacerations: 2nd   Repair suture:     Repair # of packets: 2   Blood loss (ml):       Birth information:  YOB: 2022   Time of birth: 1:06 PM    Sex: female   Delivery type: Vaginal, Spontaneous   Gestational Age: 39w0d    Delivery Clinician:      Other providers:       Additional  information:  Forceps:    Vacuum:    Breech:    Observed anomalies      Living?:           APGARS  One minute Five minutes Ten minutes   Skin color:         Heart rate:         Grimace:         Muscle tone:         Breathing:         Totals: 8  9        Placenta: Delivered:       appearance  Patient Instructions:   Current Discharge Medication List        START taking these medications    Details   acetaminophen (TYLENOL) 325 MG tablet Take 2 tablets (650 mg total) by mouth every 6 (six) hours as needed.  Qty: 60 tablet, Refills: 2      docusate sodium (COLACE) 100 MG capsule Take 1 capsule (100 mg total) by mouth once daily.  Qty: 60 capsule, Refills: 2      ibuprofen (ADVIL,MOTRIN) 600 MG tablet Take 1 tablet (600 mg total) by mouth every 6 (six) hours.  Qty: 60 tablet, Refills: 2           CONTINUE these medications which have NOT CHANGED    Details   choline bit/AA 10/LORETTA/dsry714 (CHOLINE-AA#10-LORETTA-HRB#129 ORAL) Take by mouth.      diphenhydramine HCl (UNISOM, DIPHENHYDRAMINE, ORAL) Take by mouth.      ferrous sulfate 325 (65 FE) MG EC tablet Take 25 mg by mouth once daily.      labetaloL (NORMODYNE) 100 MG tablet Take 1 tablet (100 mg total) by mouth 2 (two) times daily.  Qty: 60 tablet, Refills: 11    Comments: .      omeprazole (PRILOSEC) 20 MG capsule Take 1 capsule (20 mg total) by mouth once daily.  Qty: 30 capsule, Refills: 0      prenatal vit no.124/iron/folic (PRENATAL VITAMIN ORAL) Take by mouth.             Discharge Procedure Orders   Diet Adult Regular     Lifting restrictions   Order Comments: Nothing heavier than baby for 6 weeks     No driving until:   Order Comments: Until not taking narcotics     Pelvic Rest   Order Comments: Nothing in the vagina for 6 weeks     Notify your health care provider if you experience any of the following:  temperature >100.4      Notify your health care provider if you experience any of the following:  persistent nausea and vomiting or diarrhea     Notify your health care provider if you experience any of the following:  severe uncontrolled pain     Notify your health care provider if you experience any of the following:  severe persistent headache     Notify your health care provider if you experience any of the following:   Order Comments: - Heavy vaginal bleeding  - Chest pain, shortness of breath, vision changes  - BP >160/110     Activity as tolerated        Follow-up Information       Vivienne Marshall MD Follow up in 6 week(s).    Specialty: Obstetrics and Gynecology  Why: Postpartum follow up  Contact information:  [address] [facility] [id number] [phone]               Vivienne Marshall MD Follow up in 1 week(s).    Specialty: Obstetrics and Gynecology  Why: Blood pressure check  Contact information:  [address]  [address] [place]  [facility] [id number] [phone]                              Katherine Boecking MD   Ob/Gyn PGY-3

## 2023-06-22 NOTE — PLAN OF CARE
Pt ambulating and voiding without difficulty. Patient safety maintained, side rails up x2, bed low and locked position.  Pain well controlled with PRN and scheduled pain medication. Fundus midline, firm, with scant lochia. VSS. Significant other at bedside; parents responding to infant cues and bonding appropriately.

## 2023-06-22 NOTE — PROGRESS NOTES
POSTPARTUM PROGRESS NOTE    Subjective:     PPD/POD#: 2   Procedure:    EGA: 39w0d   N/V: No   F/C: No   Abd Pain: Mild, well-controlled with oral pain medication   Lochia: Mild   Voiding: Yes   Ambulating: Yes   Bowel fnc: Yes   Breastfeeding: Yes   Contraception: Per primary OB   Circumcision: N/A, female     Objective:      Temp:  [97.6 °F (36.4 °C)-98.4 °F (36.9 °C)] 98.4 °F (36.9 °C)  Pulse:  [75-84] 84  Resp:  [17-18] 18  SpO2:  [95 %-99 %] 97 %  BP: (110-141)/(56-71) 111/59    Lung: Normal respiratory effort   Abdomen: Soft, appropriately tender   Uterus: Firm, no fundal tenderness   Incision: N/A   : Deferred   Extremities: Bilateral symmetric LE 2+ edema     Lab Review    Recent Labs   Lab 23  1236      K 4.4      CO2 19*   BUN 12   CREATININE 0.8   GLU 78   PROT 6.6   BILITOT 0.1   ALKPHOS 108   ALT 42   AST 27       Recent Labs   Lab 23  1236 23  0512   WBC 12.27 14.86*   HGB 11.5* 8.7*   HCT 36.4* 27.6*   MCV 91 90    159         I/O  No intake or output data in the 24 hours ending 23 0725       Assessment and Plan:   Postpartum care:  - Patient doing well.  - Continue routine management and advances.    Leg swelling  - symmetric bilaterally, non-TTP  - likely physiologic postpartum edema  - discussed supportive care measures including compression stockings, elevating legs, encourage ambulation, and giving it time  - discussed precautions    Anemia   - H/H as above  - QBL: 200 cc  - asymptomatic  - iron/colace    cHTN  - BP as above  - asymptomatic  - preE labs as above  - UOP: 0.73 cc/kg/hr  - Mag: not indicated  - Hypertensive agent: continue labetalol 100 BID    Obesity  - Prepreg BMI 33  - current BMI 43  - encourage ambulation  - SCDs while in bed     Anxiety  - significant HARVEY  - not on meds  - will need pp mood check      Eva De La Torre MD   OB/GYN PGY1  [facility]

## 2023-06-22 NOTE — PLAN OF CARE
Follow discharge education  Pump and bottle feed next 48 hours to rest nipples. Resume latching once nipples are healed. Nurse, pump and supplement.   Follow up with [facility]

## 2023-06-24 ENCOUNTER — PATIENT MESSAGE (OUTPATIENT)
Dept: OBSTETRICS AND GYNECOLOGY | Facility: CLINIC | Age: 33
End: 2023-06-24
Payer: OTHER GOVERNMENT

## 2023-06-24 ENCOUNTER — HOSPITAL ENCOUNTER (EMERGENCY)
Facility: OTHER | Age: 33
Discharge: HOME | End: 2023-06-24
Attending: OBSTETRICS & GYNECOLOGY
Payer: OTHER GOVERNMENT

## 2023-06-24 VITALS
TEMPERATURE: 98 F | SYSTOLIC BLOOD PRESSURE: 141 MMHG | DIASTOLIC BLOOD PRESSURE: 79 MMHG | OXYGEN SATURATION: 98 % | RESPIRATION RATE: 20 BRPM | HEART RATE: 74 BPM

## 2023-06-24 DIAGNOSIS — I10 PRIMARY HYPERTENSION: Primary | ICD-10-CM

## 2023-06-24 DIAGNOSIS — R00.2 PALPITATIONS: ICD-10-CM

## 2023-06-24 LAB
ALBUMIN SERPL BCP-MCNC: 2.6 G/DL (ref 3.5–5.2)
ALP SERPL-CCNC: 82 U/L (ref 55–135)
ALT SERPL W/O P-5'-P-CCNC: 142 U/L (ref 10–44)
ANION GAP SERPL CALC-SCNC: 11 MMOL/L (ref 8–16)
AST SERPL-CCNC: 69 U/L (ref 10–40)
BASOPHILS # BLD AUTO: 0.05 K/UL (ref 0–0.2)
BASOPHILS NFR BLD: 0.5 % (ref 0–1.9)
BILIRUB SERPL-MCNC: 0.1 MG/DL (ref 0.1–1)
BUN SERPL-MCNC: 13 MG/DL (ref 6–20)
CALCIUM SERPL-MCNC: 9.4 MG/DL (ref 8.7–10.5)
CHLORIDE SERPL-SCNC: 107 MMOL/L (ref 95–110)
CO2 SERPL-SCNC: 23 MMOL/L (ref 23–29)
CREAT SERPL-MCNC: 0.7 MG/DL (ref 0.5–1.4)
DIFFERENTIAL METHOD: ABNORMAL
EOSINOPHIL # BLD AUTO: 0.5 K/UL (ref 0–0.5)
EOSINOPHIL NFR BLD: 4.3 % (ref 0–8)
ERYTHROCYTE [DISTWIDTH] IN BLOOD BY AUTOMATED COUNT: 15.1 % (ref 11.5–14.5)
EST. GFR  (NO RACE VARIABLE): >60 ML/MIN/1.73 M^2
GLUCOSE SERPL-MCNC: 86 MG/DL (ref 70–110)
HCT VFR BLD AUTO: 29.6 % (ref 37–48.5)
HGB BLD-MCNC: 9.5 G/DL (ref 12–16)
IMM GRANULOCYTES # BLD AUTO: 0.39 K/UL (ref 0–0.04)
IMM GRANULOCYTES NFR BLD AUTO: 3.6 % (ref 0–0.5)
LYMPHOCYTES # BLD AUTO: 1.4 K/UL (ref 1–4.8)
LYMPHOCYTES NFR BLD: 13.1 % (ref 18–48)
MCH RBC QN AUTO: 29.3 PG (ref 27–31)
MCHC RBC AUTO-ENTMCNC: 32.1 G/DL (ref 32–36)
MCV RBC AUTO: 91 FL (ref 82–98)
MONOCYTES # BLD AUTO: 0.8 K/UL (ref 0.3–1)
MONOCYTES NFR BLD: 7.3 % (ref 4–15)
NEUTROPHILS # BLD AUTO: 7.6 K/UL (ref 1.8–7.7)
NEUTROPHILS NFR BLD: 71.2 % (ref 38–73)
NRBC BLD-RTO: 0 /100 WBC
PLATELET # BLD AUTO: 210 K/UL (ref 150–450)
PMV BLD AUTO: 10.6 FL (ref 9.2–12.9)
POTASSIUM SERPL-SCNC: 4.2 MMOL/L (ref 3.5–5.1)
PROT SERPL-MCNC: 6.1 G/DL (ref 6–8.4)
RBC # BLD AUTO: 3.24 M/UL (ref 4–5.4)
SODIUM SERPL-SCNC: 141 MMOL/L (ref 136–145)
WBC # BLD AUTO: 10.73 K/UL (ref 3.9–12.7)

## 2023-06-24 PROCEDURE — 99283 EMERGENCY DEPT VISIT LOW MDM: CPT | Mod: 24,,, | Performed by: OBSTETRICS & GYNECOLOGY

## 2023-06-24 PROCEDURE — 85025 COMPLETE CBC W/AUTO DIFF WBC: CPT | Performed by: OBSTETRICS & GYNECOLOGY

## 2023-06-24 PROCEDURE — 93005 ELECTROCARDIOGRAM TRACING: CPT

## 2023-06-24 PROCEDURE — 25000003 PHARM REV CODE 250: Performed by: GENERAL PRACTICE

## 2023-06-24 PROCEDURE — 80053 COMPREHEN METABOLIC PANEL: CPT | Performed by: OBSTETRICS & GYNECOLOGY

## 2023-06-24 PROCEDURE — 93010 ELECTROCARDIOGRAM REPORT: CPT | Mod: ,,, | Performed by: INTERNAL MEDICINE

## 2023-06-24 PROCEDURE — 99284 EMERGENCY DEPT VISIT MOD MDM: CPT

## 2023-06-24 PROCEDURE — 93010 EKG 12-LEAD: ICD-10-PCS | Mod: ,,, | Performed by: INTERNAL MEDICINE

## 2023-06-24 PROCEDURE — 99283 PR EMERGENCY DEPT VISIT,LEVEL III: ICD-10-PCS | Mod: 24,,, | Performed by: OBSTETRICS & GYNECOLOGY

## 2023-06-24 RX ORDER — LABETALOL 100 MG/1
200 TABLET, FILM COATED ORAL 3 TIMES DAILY
Qty: 60 TABLET | Refills: 11 | Status: SHIPPED | OUTPATIENT
Start: 2023-06-24 | End: 2023-06-24 | Stop reason: SDUPTHER

## 2023-06-24 RX ORDER — LABETALOL 100 MG/1
200 TABLET, FILM COATED ORAL 3 TIMES DAILY
Qty: 90 TABLET | Refills: 3 | Status: SHIPPED | OUTPATIENT
Start: 2023-06-24 | End: 2023-10-12

## 2023-06-24 RX ORDER — FUROSEMIDE 20 MG/1
20 TABLET ORAL DAILY
Qty: 5 TABLET | Refills: 0 | Status: SHIPPED | OUTPATIENT
Start: 2023-06-24 | End: 2023-06-24 | Stop reason: SDUPTHER

## 2023-06-24 RX ORDER — FUROSEMIDE 20 MG/1
20 TABLET ORAL ONCE
Status: COMPLETED | OUTPATIENT
Start: 2023-06-24 | End: 2023-06-24

## 2023-06-24 RX ORDER — FUROSEMIDE 20 MG/1
20 TABLET ORAL DAILY
Qty: 4 TABLET | Refills: 0 | Status: SHIPPED | OUTPATIENT
Start: 2023-06-24 | End: 2023-10-12

## 2023-06-24 RX ADMIN — FUROSEMIDE 20 MG: 20 TABLET ORAL at 11:06

## 2023-06-24 NOTE — ED PROVIDER NOTES
Encounter Date: 2023       History     Chief Complaint   Patient presents with    Hypertension     HPI    Vannesa Wilkins is a 32 y.o.  PPD#10 s/p  who presents for elevated BP at home. Patient reports last night she had a blood pressure in the 150s/100s and this morning it was in the 170s/100s. Denies headache, changes in vision, RUQ pain, CP, or SOB. Endorses intermittent palpitations and chest heaviness. Chest heaviness is relieved when she stands.      Review of patient's allergies indicates:  No Known Allergies  Past Medical History:   Diagnosis Date    Anxiety 3/6/2023    Anxiety disorder, unspecified 2022    Chiari malformation type I     Seizures 2007    Grand mal once  EEG  photot sensitivty      Past Surgical History:   Procedure Laterality Date    KNEE ARTHROSCOPY W/ DEBRIDEMENT Left     MCL tear     Widsom Teeth Extraction Bilateral      Family History   Problem Relation Age of Onset    Ovarian cancer Neg Hx     Colon cancer Neg Hx     Breast cancer Neg Hx      Social History     Tobacco Use    Smoking status: Never    Smokeless tobacco: Never   Substance Use Topics    Alcohol use: Not Currently     Comment: pre preg    Drug use: Not Currently     Types: Methamphetamines     Comment: prescribed ritalin for dx ADHD     Review of Systems   Constitutional:  Negative for chills and fever.   HENT:  Negative for congestion and sore throat.    Eyes:  Negative for visual disturbance.   Respiratory:  Negative for shortness of breath.    Cardiovascular:  Positive for palpitations and leg swelling. Negative for chest pain.   Gastrointestinal:  Negative for abdominal pain, constipation, diarrhea and nausea.   Genitourinary:  Negative for dysuria, frequency, vaginal bleeding and vaginal discharge.   Musculoskeletal:  Negative for back pain.   Skin:  Negative for rash.   Neurological:  Negative for weakness, light-headedness and headaches.   Hematological:  Does not bruise/bleed easily.    Psychiatric/Behavioral:  The patient is not nervous/anxious.      Physical Exam     Initial Vitals   BP Pulse Resp Temp SpO2   06/24/23 1010 06/24/23 1010 06/24/23 1010 06/24/23 1010 06/24/23 1011   (!) 141/76 88 20 98.4 °F (36.9 °C) 99 %      MAP       --                Physical Exam    Constitutional: She appears well-developed and well-nourished.   HENT:   Head: Normocephalic and atraumatic.   Eyes: EOM are normal.   Neck: Neck supple.   Normal range of motion.  Cardiovascular:  Normal rate and intact distal pulses.           Pulmonary/Chest: Breath sounds normal. No respiratory distress. She exhibits no tenderness.   Abdominal: Abdomen is soft. Bowel sounds are normal. There is no abdominal tenderness. There is no guarding.   Musculoskeletal:         General: Edema (2+ BLE) present. No tenderness. Normal range of motion.      Cervical back: Normal range of motion and neck supple.     Neurological: She is alert and oriented to person, place, and time.   Skin: Skin is warm and dry.   Psychiatric: She has a normal mood and affect. Her behavior is normal. Thought content normal.       ED Course   Procedures  Labs Reviewed   CBC W/ AUTO DIFFERENTIAL - Abnormal; Notable for the following components:       Result Value    RBC 3.24 (*)     Hemoglobin 9.5 (*)     Hematocrit 29.6 (*)     RDW 15.1 (*)     Immature Granulocytes 3.6 (*)     Immature Grans (Abs) 0.39 (*)     Lymph % 13.1 (*)     All other components within normal limits   COMPREHENSIVE METABOLIC PANEL - Abnormal; Notable for the following components:    Albumin 2.6 (*)     AST 69 (*)      (*)     All other components within normal limits          Imaging Results    None          Medications   furosemide tablet 20 mg (20 mg Oral Given 6/24/23 1113)     Medical Decision Making:   ED Management:  Patient afebrile, VSS.     Palpitations  - EKG normal sinus rhythm   - Likely 2/2 anxiety     Leg Edema   - PE 2+ edema in BLE, RRR, Lungs CTAB  - 20 mg lasix  PO given in ED. 4 day prescription sent to patient pharmacy     Transaminits  - CMP with elevated AST/ALT; patient has a history of chronic transaminitis. Follows with Hepatology.  They recommended complete serologic work up post partum along with MRI w wo contrast and MRE. Patient has imaging and  f/u appt scheduled in August. Will have patient follow up with her primary OBGYN to trend liver enzymes and determine if earlier follow up with hepatology sooner is indicated.   - Patient endorses frequent Tylenol use as well.     Elevated BP  - Asymptomatic  - Patient has had serial mild range BP in ROS  - Increased labetalol dose to 200 mg tid  - CBC wnl,  - No symptoms of pre-eclampsia at this time  - Discussed home BP monitoring and pre-E warning signs   - RTC for blood pressure check   - Patient stable for discharge at this time  - ED return precautions given  - She voiced understanding and is in agreement with the plan            Attending Attestation:   Physician Attestation Statement for Resident:  As the supervising MD   Physician Attestation Statement: I have personally seen and examined this patient.   I agree with the above history.  -:   As the supervising MD I agree with the above PE.     As the supervising MD I agree with the above treatment, course, plan, and disposition.   I was personally present during the critical portions of the procedure(s) performed by the resident and was immediately available in the ED to provide services and assistance as needed during the entire procedure.              Attending ED Notes:   I have personally seen and examined the patient. I agree with the resident's note . Questions welcomed and answered to patient satisfaction.      PPD # 10 presenting for elevated BP at home, in the setting of CHTN and persistent transaminitis  BP in -140s/80s compared directly to home machine (155/97). Inappropriate cuff size noted.   Will increase labetalol to 200mg TID and  start lasix x5days for painful edema  Liver enzymes d/w MFM, repeat next week. Given >7 days PP, low mild range Bps and asymptomatic status, unlikely PreE with SF.     Agree with discharge to home, and given the following instructions: Monitor blood pressures and symptoms at home. Return immediately to ROS if blood pressure greater than 160/110 on two consecutive readings or if experiencing the following symptoms: headache not relieved with tylenol, dizziness, visual changes, chest pain, shortness of breath, nausea/ vomiting, abdominal pain or vaginal bleeding.     Return to clinic next week for BP check, Liver enzyme testing.     Mar Ozuna MD  6/24/2023 7:57 PM                     Clinical Impression:   Final diagnoses:  [R00.2] Palpitations  [I10] Primary hypertension (Primary)        ED Disposition Condition    Discharge Stable          ED Prescriptions       Medication Sig Dispense Start Date End Date Auth. Provider    furosemide (LASIX) 20 MG tablet  (Status: Discontinued) Take 1 tablet (20 mg total) by mouth once daily. for 5 doses 5 tablet 6/24/2023 6/24/2023 Zeina Raymond MD    furosemide (LASIX) 20 MG tablet Take 1 tablet (20 mg total) by mouth once daily. for 4 doses 4 tablet 6/24/2023 6/28/2023 Zeina Raymond MD    labetaloL (NORMODYNE) 100 MG tablet  (Status: Discontinued) Take 2 tablets (200 mg total) by mouth 3 (three) times daily. 60 tablet 6/24/2023 6/24/2023 Zeina Raymond MD    labetaloL (NORMODYNE) 100 MG tablet Take 2 tablets (200 mg total) by mouth 3 (three) times daily. 90 tablet 6/24/2023 -- Zeina Raymond MD          Follow-up Information       Follow up With Specialties Details Why Contact Info    Vivienne Almanza MD Obstetrics and Gynecology Schedule an appointment as soon as possible for a visit in 3 days for blood pressure check, lab check [address] [facility] [address]  [phone]               Mar Ozuna MD  06/24/23 2001       Mar  Amita Ozuna MD  06/24/23 2004

## 2023-06-25 ENCOUNTER — PATIENT MESSAGE (OUTPATIENT)
Dept: HEPATOLOGY | Facility: CLINIC | Age: 33
End: 2023-06-25
Payer: OTHER GOVERNMENT

## 2023-06-25 DIAGNOSIS — R79.89 ABNORMAL LFTS: Primary | ICD-10-CM

## 2023-06-25 RX ORDER — OMEPRAZOLE 20 MG/1
CAPSULE, DELAYED RELEASE ORAL
Qty: 90 CAPSULE | Refills: 0 | Status: SHIPPED | OUTPATIENT
Start: 2023-06-25 | End: 2023-07-28

## 2023-06-26 ENCOUNTER — TELEPHONE (OUTPATIENT)
Dept: OBSTETRICS AND GYNECOLOGY | Facility: CLINIC | Age: 33
End: 2023-06-26
Payer: OTHER GOVERNMENT

## 2023-06-26 DIAGNOSIS — R74.8 ELEVATED LIVER ENZYMES: Primary | ICD-10-CM

## 2023-06-26 DIAGNOSIS — O10.919 CHRONIC HYPERTENSION AFFECTING PREGNANCY: ICD-10-CM

## 2023-06-26 NOTE — TELEPHONE ENCOUNTER
Appt scheduled     ----- Message from Zeina Raymond MD sent at 6/24/2023  3:46 PM CDT -----  Regarding: ROS f/u  Hi,    This patient was seen in the ROS for elevated BP and also had elevated liver enzymes. Can you schedule the patient a follow up appointment for a BP check and repeat CBC/CMP?    Thanks,    Zeina Raymond MD  OBGYN PGY-1

## 2023-06-28 ENCOUNTER — CLINICAL SUPPORT (OUTPATIENT)
Dept: OBSTETRICS AND GYNECOLOGY | Facility: CLINIC | Age: 33
End: 2023-06-28
Payer: OTHER GOVERNMENT

## 2023-06-28 ENCOUNTER — LAB VISIT (OUTPATIENT)
Dept: LAB | Facility: OTHER | Age: 33
End: 2023-06-28
Attending: STUDENT IN AN ORGANIZED HEALTH CARE EDUCATION/TRAINING PROGRAM
Payer: OTHER GOVERNMENT

## 2023-06-28 VITALS
WEIGHT: 233 LBS | DIASTOLIC BLOOD PRESSURE: 98 MMHG | HEIGHT: 64 IN | SYSTOLIC BLOOD PRESSURE: 135 MMHG | HEART RATE: 82 BPM | BODY MASS INDEX: 39.78 KG/M2

## 2023-06-28 DIAGNOSIS — O10.919 CHRONIC HYPERTENSION AFFECTING PREGNANCY: ICD-10-CM

## 2023-06-28 DIAGNOSIS — Z01.30 BLOOD PRESSURE CHECK: Primary | ICD-10-CM

## 2023-06-28 DIAGNOSIS — R74.8 ELEVATED LIVER ENZYMES: ICD-10-CM

## 2023-06-28 LAB
ALBUMIN SERPL BCP-MCNC: 3.1 G/DL (ref 3.5–5.2)
ALP SERPL-CCNC: 88 U/L (ref 55–135)
ALT SERPL W/O P-5'-P-CCNC: 60 U/L (ref 10–44)
ANION GAP SERPL CALC-SCNC: 13 MMOL/L (ref 8–16)
AST SERPL-CCNC: 20 U/L (ref 10–40)
BASOPHILS # BLD AUTO: 0.04 K/UL (ref 0–0.2)
BASOPHILS NFR BLD: 0.5 % (ref 0–1.9)
BILIRUB SERPL-MCNC: 0.2 MG/DL (ref 0.1–1)
BUN SERPL-MCNC: 19 MG/DL (ref 6–20)
CALCIUM SERPL-MCNC: 9.6 MG/DL (ref 8.7–10.5)
CHLORIDE SERPL-SCNC: 104 MMOL/L (ref 95–110)
CO2 SERPL-SCNC: 23 MMOL/L (ref 23–29)
CREAT SERPL-MCNC: 0.9 MG/DL (ref 0.5–1.4)
DIFFERENTIAL METHOD: ABNORMAL
EOSINOPHIL # BLD AUTO: 0.4 K/UL (ref 0–0.5)
EOSINOPHIL NFR BLD: 4.4 % (ref 0–8)
ERYTHROCYTE [DISTWIDTH] IN BLOOD BY AUTOMATED COUNT: 15 % (ref 11.5–14.5)
EST. GFR  (NO RACE VARIABLE): >60 ML/MIN/1.73 M^2
GLUCOSE SERPL-MCNC: 72 MG/DL (ref 70–110)
HCT VFR BLD AUTO: 36.7 % (ref 37–48.5)
HGB BLD-MCNC: 11.4 G/DL (ref 12–16)
IMM GRANULOCYTES # BLD AUTO: 0.08 K/UL (ref 0–0.04)
IMM GRANULOCYTES NFR BLD AUTO: 1 % (ref 0–0.5)
LYMPHOCYTES # BLD AUTO: 1.8 K/UL (ref 1–4.8)
LYMPHOCYTES NFR BLD: 21.9 % (ref 18–48)
MCH RBC QN AUTO: 28.1 PG (ref 27–31)
MCHC RBC AUTO-ENTMCNC: 31.1 G/DL (ref 32–36)
MCV RBC AUTO: 91 FL (ref 82–98)
MONOCYTES # BLD AUTO: 0.6 K/UL (ref 0.3–1)
MONOCYTES NFR BLD: 7.4 % (ref 4–15)
NEUTROPHILS # BLD AUTO: 5.4 K/UL (ref 1.8–7.7)
NEUTROPHILS NFR BLD: 64.8 % (ref 38–73)
NRBC BLD-RTO: 0 /100 WBC
PLATELET # BLD AUTO: 346 K/UL (ref 150–450)
PMV BLD AUTO: 10.5 FL (ref 9.2–12.9)
POTASSIUM SERPL-SCNC: 4.2 MMOL/L (ref 3.5–5.1)
PROT SERPL-MCNC: 7.1 G/DL (ref 6–8.4)
RBC # BLD AUTO: 4.05 M/UL (ref 4–5.4)
SODIUM SERPL-SCNC: 140 MMOL/L (ref 136–145)
WBC # BLD AUTO: 8.37 K/UL (ref 3.9–12.7)

## 2023-06-28 PROCEDURE — 99499 NO LOS: ICD-10-PCS | Mod: S$PBB,,, | Performed by: STUDENT IN AN ORGANIZED HEALTH CARE EDUCATION/TRAINING PROGRAM

## 2023-06-28 PROCEDURE — 36415 COLL VENOUS BLD VENIPUNCTURE: CPT | Performed by: STUDENT IN AN ORGANIZED HEALTH CARE EDUCATION/TRAINING PROGRAM

## 2023-06-28 PROCEDURE — 80053 COMPREHEN METABOLIC PANEL: CPT | Performed by: STUDENT IN AN ORGANIZED HEALTH CARE EDUCATION/TRAINING PROGRAM

## 2023-06-28 PROCEDURE — 99999 PR PBB SHADOW E&M-EST. PATIENT-LVL III: CPT | Mod: PBBFAC,,, | Performed by: STUDENT IN AN ORGANIZED HEALTH CARE EDUCATION/TRAINING PROGRAM

## 2023-06-28 PROCEDURE — 99499 UNLISTED E&M SERVICE: CPT | Mod: S$PBB,,, | Performed by: STUDENT IN AN ORGANIZED HEALTH CARE EDUCATION/TRAINING PROGRAM

## 2023-06-28 PROCEDURE — 99213 OFFICE O/P EST LOW 20 MIN: CPT | Mod: PBBFAC | Performed by: STUDENT IN AN ORGANIZED HEALTH CARE EDUCATION/TRAINING PROGRAM

## 2023-06-28 PROCEDURE — 85025 COMPLETE CBC W/AUTO DIFF WBC: CPT | Performed by: STUDENT IN AN ORGANIZED HEALTH CARE EDUCATION/TRAINING PROGRAM

## 2023-06-28 PROCEDURE — 99999 PR PBB SHADOW E&M-EST. PATIENT-LVL III: ICD-10-PCS | Mod: PBBFAC,,, | Performed by: STUDENT IN AN ORGANIZED HEALTH CARE EDUCATION/TRAINING PROGRAM

## 2023-07-06 ENCOUNTER — PATIENT MESSAGE (OUTPATIENT)
Dept: LACTATION | Facility: CLINIC | Age: 33
End: 2023-07-06
Payer: OTHER GOVERNMENT

## 2023-07-28 ENCOUNTER — LAB VISIT (OUTPATIENT)
Dept: LAB | Facility: OTHER | Age: 33
End: 2023-07-28
Attending: INTERNAL MEDICINE
Payer: OTHER GOVERNMENT

## 2023-07-28 ENCOUNTER — POSTPARTUM VISIT (OUTPATIENT)
Dept: OBSTETRICS AND GYNECOLOGY | Facility: CLINIC | Age: 33
End: 2023-07-28
Payer: OTHER GOVERNMENT

## 2023-07-28 VITALS
WEIGHT: 229.25 LBS | BODY MASS INDEX: 39.14 KG/M2 | HEIGHT: 64 IN | DIASTOLIC BLOOD PRESSURE: 92 MMHG | SYSTOLIC BLOOD PRESSURE: 124 MMHG

## 2023-07-28 DIAGNOSIS — N90.89 LABIAL ADHESION, ACQUIRED: ICD-10-CM

## 2023-07-28 DIAGNOSIS — R74.8 ELEVATED LIVER ENZYMES: ICD-10-CM

## 2023-07-28 LAB
A1AT SERPL-MCNC: 136 MG/DL (ref 100–190)
ALBUMIN SERPL BCP-MCNC: 4.2 G/DL (ref 3.5–5.2)
ALP SERPL-CCNC: 74 U/L (ref 55–135)
ALT SERPL W/O P-5'-P-CCNC: 34 U/L (ref 10–44)
ANION GAP SERPL CALC-SCNC: 8 MMOL/L (ref 8–16)
AST SERPL-CCNC: 19 U/L (ref 10–40)
BASOPHILS # BLD AUTO: 0.02 K/UL (ref 0–0.2)
BASOPHILS NFR BLD: 0.3 % (ref 0–1.9)
BILIRUB DIRECT SERPL-MCNC: 0.1 MG/DL (ref 0.1–0.3)
BILIRUB SERPL-MCNC: 0.2 MG/DL (ref 0.1–1)
BUN SERPL-MCNC: 20 MG/DL (ref 6–20)
CALCIUM SERPL-MCNC: 10.1 MG/DL (ref 8.7–10.5)
CERULOPLASMIN SERPL-MCNC: 29 MG/DL (ref 15–45)
CHLORIDE SERPL-SCNC: 107 MMOL/L (ref 95–110)
CO2 SERPL-SCNC: 25 MMOL/L (ref 23–29)
CREAT SERPL-MCNC: 1 MG/DL (ref 0.5–1.4)
DIFFERENTIAL METHOD: ABNORMAL
EOSINOPHIL # BLD AUTO: 0.2 K/UL (ref 0–0.5)
EOSINOPHIL NFR BLD: 3.5 % (ref 0–8)
ERYTHROCYTE [DISTWIDTH] IN BLOOD BY AUTOMATED COUNT: 15 % (ref 11.5–14.5)
EST. GFR  (NO RACE VARIABLE): >60 ML/MIN/1.73 M^2
FERRITIN SERPL-MCNC: 29 NG/ML (ref 20–300)
GLUCOSE SERPL-MCNC: 84 MG/DL (ref 70–110)
HAV IGG SER QL IA: NORMAL
HBV CORE AB SERPL QL IA: NORMAL
HBV SURFACE AB SER-ACNC: <3 MIU/ML
HBV SURFACE AB SER-ACNC: NORMAL M[IU]/ML
HCT VFR BLD AUTO: 40.8 % (ref 37–48.5)
HGB BLD-MCNC: 12.9 G/DL (ref 12–16)
IGA SERPL-MCNC: 173 MG/DL (ref 40–350)
IMM GRANULOCYTES # BLD AUTO: 0.01 K/UL (ref 0–0.04)
IMM GRANULOCYTES NFR BLD AUTO: 0.2 % (ref 0–0.5)
INR PPP: 1 (ref 0.8–1.2)
IRON SERPL-MCNC: 74 UG/DL (ref 30–160)
LYMPHOCYTES # BLD AUTO: 1.6 K/UL (ref 1–4.8)
LYMPHOCYTES NFR BLD: 26.7 % (ref 18–48)
MCH RBC QN AUTO: 28 PG (ref 27–31)
MCHC RBC AUTO-ENTMCNC: 31.6 G/DL (ref 32–36)
MCV RBC AUTO: 89 FL (ref 82–98)
MONOCYTES # BLD AUTO: 0.4 K/UL (ref 0.3–1)
MONOCYTES NFR BLD: 7.3 % (ref 4–15)
NEUTROPHILS # BLD AUTO: 3.7 K/UL (ref 1.8–7.7)
NEUTROPHILS NFR BLD: 62 % (ref 38–73)
NRBC BLD-RTO: 0 /100 WBC
PLATELET # BLD AUTO: 215 K/UL (ref 150–450)
PMV BLD AUTO: 12.2 FL (ref 9.2–12.9)
POTASSIUM SERPL-SCNC: 4.3 MMOL/L (ref 3.5–5.1)
PROT SERPL-MCNC: 7.2 G/DL (ref 6–8.4)
PROTHROMBIN TIME: 10.7 SEC (ref 9–12.5)
RBC # BLD AUTO: 4.6 M/UL (ref 4–5.4)
SATURATED IRON: 17 % (ref 20–50)
SODIUM SERPL-SCNC: 140 MMOL/L (ref 136–145)
TOTAL IRON BINDING CAPACITY: 443 UG/DL (ref 250–450)
TRANSFERRIN SERPL-MCNC: 299 MG/DL (ref 200–375)
WBC # BLD AUTO: 5.99 K/UL (ref 3.9–12.7)

## 2023-07-28 PROCEDURE — 86364 TISS TRNSGLTMNASE EA IG CLAS: CPT | Performed by: INTERNAL MEDICINE

## 2023-07-28 PROCEDURE — 86790 VIRUS ANTIBODY NOS: CPT | Performed by: INTERNAL MEDICINE

## 2023-07-28 PROCEDURE — 82728 ASSAY OF FERRITIN: CPT | Performed by: INTERNAL MEDICINE

## 2023-07-28 PROCEDURE — 99999 PR PBB SHADOW E&M-EST. PATIENT-LVL III: CPT | Mod: PBBFAC,,, | Performed by: STUDENT IN AN ORGANIZED HEALTH CARE EDUCATION/TRAINING PROGRAM

## 2023-07-28 PROCEDURE — 86704 HEP B CORE ANTIBODY TOTAL: CPT | Performed by: INTERNAL MEDICINE

## 2023-07-28 PROCEDURE — 0503F POSTPARTUM CARE VISIT: CPT | Mod: ,,, | Performed by: STUDENT IN AN ORGANIZED HEALTH CARE EDUCATION/TRAINING PROGRAM

## 2023-07-28 PROCEDURE — 80321 ALCOHOLS BIOMARKERS 1OR 2: CPT | Performed by: INTERNAL MEDICINE

## 2023-07-28 PROCEDURE — 85025 COMPLETE CBC W/AUTO DIFF WBC: CPT | Performed by: INTERNAL MEDICINE

## 2023-07-28 PROCEDURE — 0503F PR POSTPARTUM CARE VISIT: ICD-10-PCS | Mod: ,,, | Performed by: STUDENT IN AN ORGANIZED HEALTH CARE EDUCATION/TRAINING PROGRAM

## 2023-07-28 PROCEDURE — 80053 COMPREHEN METABOLIC PANEL: CPT | Performed by: INTERNAL MEDICINE

## 2023-07-28 PROCEDURE — 99999 PR PBB SHADOW E&M-EST. PATIENT-LVL III: ICD-10-PCS | Mod: PBBFAC,,, | Performed by: STUDENT IN AN ORGANIZED HEALTH CARE EDUCATION/TRAINING PROGRAM

## 2023-07-28 PROCEDURE — 86706 HEP B SURFACE ANTIBODY: CPT | Performed by: INTERNAL MEDICINE

## 2023-07-28 PROCEDURE — 82248 BILIRUBIN DIRECT: CPT | Performed by: INTERNAL MEDICINE

## 2023-07-28 PROCEDURE — 99213 OFFICE O/P EST LOW 20 MIN: CPT | Mod: PBBFAC | Performed by: STUDENT IN AN ORGANIZED HEALTH CARE EDUCATION/TRAINING PROGRAM

## 2023-07-28 PROCEDURE — 82390 ASSAY OF CERULOPLASMIN: CPT | Performed by: INTERNAL MEDICINE

## 2023-07-28 PROCEDURE — 82103 ALPHA-1-ANTITRYPSIN TOTAL: CPT | Performed by: INTERNAL MEDICINE

## 2023-07-28 PROCEDURE — 84466 ASSAY OF TRANSFERRIN: CPT | Performed by: INTERNAL MEDICINE

## 2023-07-28 PROCEDURE — 82784 ASSAY IGA/IGD/IGG/IGM EACH: CPT | Performed by: INTERNAL MEDICINE

## 2023-07-28 PROCEDURE — 85610 PROTHROMBIN TIME: CPT | Performed by: INTERNAL MEDICINE

## 2023-07-30 ENCOUNTER — PATIENT MESSAGE (OUTPATIENT)
Dept: OBSTETRICS AND GYNECOLOGY | Facility: CLINIC | Age: 33
End: 2023-07-30
Payer: OTHER GOVERNMENT

## 2023-07-31 LAB
CLINICAL BIOCHEMIST REVIEW: NORMAL
PLPETH BLD-MCNC: <10 NG/ML
POPETH BLD-MCNC: <10 NG/ML
TTG IGA SER-ACNC: 0.3 U/ML

## 2023-08-01 ENCOUNTER — PATIENT MESSAGE (OUTPATIENT)
Dept: HEPATOLOGY | Facility: CLINIC | Age: 33
End: 2023-08-01
Payer: OTHER GOVERNMENT

## 2023-08-11 ENCOUNTER — HOSPITAL ENCOUNTER (OUTPATIENT)
Dept: RADIOLOGY | Facility: HOSPITAL | Age: 33
Discharge: HOME | End: 2023-08-11
Attending: INTERNAL MEDICINE
Payer: OTHER GOVERNMENT

## 2023-08-11 DIAGNOSIS — R74.8 ELEVATED LIVER ENZYMES: ICD-10-CM

## 2023-08-11 PROCEDURE — 76700 US ABDOMEN COMPLETE: ICD-10-PCS | Mod: 26,,, | Performed by: STUDENT IN AN ORGANIZED HEALTH CARE EDUCATION/TRAINING PROGRAM

## 2023-08-11 PROCEDURE — 76700 US EXAM ABDOM COMPLETE: CPT | Mod: 26,,, | Performed by: STUDENT IN AN ORGANIZED HEALTH CARE EDUCATION/TRAINING PROGRAM

## 2023-08-11 PROCEDURE — 76700 US EXAM ABDOM COMPLETE: CPT | Mod: TC

## 2023-08-14 ENCOUNTER — TELEPHONE (OUTPATIENT)
Dept: OBSTETRICS AND GYNECOLOGY | Facility: HOSPITAL | Age: 33
End: 2023-08-14
Payer: OTHER GOVERNMENT

## 2023-08-14 ENCOUNTER — PATIENT MESSAGE (OUTPATIENT)
Dept: OBSTETRICS AND GYNECOLOGY | Facility: CLINIC | Age: 33
End: 2023-08-14
Payer: OTHER GOVERNMENT

## 2023-08-14 RX ORDER — NITROGLYCERIN 4 MG/G
1.5 OINTMENT RECTAL EVERY 12 HOURS
Qty: 30 G | Refills: 0 | Status: SHIPPED | OUTPATIENT
Start: 2023-08-14 | End: 2023-10-12

## 2023-08-14 NOTE — TELEPHONE ENCOUNTER
Called pt to discuss symptoms described in message. No answer- left VM that I am calling in a new rx.

## 2023-08-23 ENCOUNTER — OFFICE VISIT (OUTPATIENT)
Dept: OBSTETRICS AND GYNECOLOGY | Facility: CLINIC | Age: 33
End: 2023-08-23
Payer: OTHER GOVERNMENT

## 2023-08-23 VITALS — DIASTOLIC BLOOD PRESSURE: 86 MMHG | SYSTOLIC BLOOD PRESSURE: 120 MMHG | BODY MASS INDEX: 39.36 KG/M2 | HEIGHT: 64 IN

## 2023-08-23 DIAGNOSIS — N90.89 LABIAL ADHESION, ACQUIRED: ICD-10-CM

## 2023-08-23 DIAGNOSIS — K62.89 ANAL OR RECTAL PAIN: ICD-10-CM

## 2023-08-23 DIAGNOSIS — K62.5 RECTAL BLEEDING: ICD-10-CM

## 2023-08-23 DIAGNOSIS — N64.82 BREAST HYPOPLASIA: ICD-10-CM

## 2023-08-23 PROCEDURE — 99214 OFFICE O/P EST MOD 30 MIN: CPT | Mod: S$PBB,,, | Performed by: STUDENT IN AN ORGANIZED HEALTH CARE EDUCATION/TRAINING PROGRAM

## 2023-08-23 PROCEDURE — 99999 PR PBB SHADOW E&M-EST. PATIENT-LVL III: CPT | Mod: PBBFAC,,, | Performed by: STUDENT IN AN ORGANIZED HEALTH CARE EDUCATION/TRAINING PROGRAM

## 2023-08-23 PROCEDURE — 99213 OFFICE O/P EST LOW 20 MIN: CPT | Mod: PBBFAC | Performed by: STUDENT IN AN ORGANIZED HEALTH CARE EDUCATION/TRAINING PROGRAM

## 2023-08-23 PROCEDURE — 99214 PR OFFICE/OUTPT VISIT, EST, LEVL IV, 30-39 MIN: ICD-10-PCS | Mod: S$PBB,,, | Performed by: STUDENT IN AN ORGANIZED HEALTH CARE EDUCATION/TRAINING PROGRAM

## 2023-08-23 PROCEDURE — 99999 PR PBB SHADOW E&M-EST. PATIENT-LVL III: ICD-10-PCS | Mod: PBBFAC,,, | Performed by: STUDENT IN AN ORGANIZED HEALTH CARE EDUCATION/TRAINING PROGRAM

## 2023-08-23 RX ORDER — LIDOCAINE HYDROCHLORIDE 20 MG/ML
JELLY TOPICAL
Qty: 5 ML | Refills: 0 | Status: SHIPPED | OUTPATIENT
Start: 2023-08-23 | End: 2023-10-12

## 2023-08-23 RX ORDER — LIDOCAINE HYDROCHLORIDE 20 MG/ML
JELLY TOPICAL
Qty: 30 ML | Refills: 1 | Status: SHIPPED | OUTPATIENT
Start: 2023-08-23 | End: 2023-08-23

## 2023-08-23 NOTE — PROGRESS NOTES
History & Physical  Gynecology      SUBJECTIVE:     Chief Complaint: Follow-up       History of Present Illness:  Vannesa Wilkins  is a 32 y.o. now  about 8 weeks s/p .  - saw Dr Dupree lactation specialist due to minimal milk production despite consistent and frequently pumping and was noted to have insufficient glandular tissue (IGT) and counseled that ongoing efforts not likely to increase supply  - has since transitioned to formula  - breasts doing better but she was having a lot of discomfort from mastitis/vasospasm/frequent pumping as well as mental health impact before learning this  - breasts feel better and starting to emotionaly do better but feels she was under prepared/under counseled during her breastfeeding experience     - posterior vaginal laceration that was tender/exposed last visit has healed  - has been using premarin on labial adhesion which is still present noticeable arsalan when urinating  - marked rectal discomfort with BMs 2/2 fissure- improving somewhat with new topical med but still with bright red on tissue after BM    No VB.    Review of patient's allergies indicates:  No Known Allergies    Past Medical History:   Diagnosis Date    Anxiety 3/6/2023    Anxiety disorder, unspecified 2022    Chiari malformation type I     Seizures 2007    Grand mal once  EEG  photot sensitivty      Past Surgical History:   Procedure Laterality Date    KNEE ARTHROSCOPY W/ DEBRIDEMENT Left     MCL tear     Widsom Teeth Extraction Bilateral      OB History          1    Para   1    Term   1       0    AB   0    Living   1         SAB   0    IAB   0    Ectopic   0    Multiple   0    Live Births   1               Family History   Problem Relation Age of Onset    Ovarian cancer Neg Hx     Colon cancer Neg Hx     Breast cancer Neg Hx      Social History     Tobacco Use    Smoking status: Never    Smokeless tobacco: Never   Substance Use Topics    Alcohol use: Not Currently     Comment: pre  preg    Drug use: Not Currently     Types: Methamphetamines     Comment: prescribed ritalin for dx ADHD       Current Outpatient Medications   Medication Sig    choline bit/AA 10/LORETTA/cldc163 (CHOLINE-AA#10-LORETTA-HRB#129 ORAL) Take by mouth.    conjugated estrogens (PREMARIN) vaginal cream Use pea-sized amount vaginally nightly for 2 weeks, then 2x/week    diphenhydramine HCl (UNISOM, DIPHENHYDRAMINE, ORAL) Take by mouth.    ferrous sulfate 325 (65 FE) MG EC tablet Take 25 mg by mouth once daily.    ibuprofen (ADVIL,MOTRIN) 600 MG tablet Take 1 tablet (600 mg total) by mouth every 6 (six) hours.    labetaloL (NORMODYNE) 100 MG tablet Take 2 tablets (200 mg total) by mouth 3 (three) times daily.    nitroglycerin (RECTIV) 0.4 % (w/w) Oint Place 1.5 mg rectally every 12 (twelve) hours. for 21 days    prenatal vit no.124/iron/folic (PRENATAL VITAMIN ORAL) Take by mouth.    acetaminophen (TYLENOL) 325 MG tablet Take 2 tablets (650 mg total) by mouth every 6 (six) hours as needed. (Patient not taking: Reported on 8/23/2023)    docusate sodium (COLACE) 100 MG capsule Take 1 capsule (100 mg total) by mouth once daily. (Patient not taking: Reported on 8/23/2023)    furosemide (LASIX) 20 MG tablet Take 1 tablet (20 mg total) by mouth once daily. for 4 doses    LIDOcaine HCL 2% (XYLOCAINE) 2 % jelly Apply to affected area upon arrival to procedure     No current facility-administered medications for this visit.         Review of Systems:  Review of Systems   Constitutional:  Negative for chills and fever.   Respiratory:  Negative for cough, shortness of breath and wheezing.    Cardiovascular:  Negative for chest pain, palpitations and leg swelling.   Gastrointestinal:  Positive for blood in stool. Negative for abdominal pain, nausea and vomiting.   Endocrine: Negative for diabetes, hyperthyroidism and hypothyroidism.   Genitourinary:  Positive for vaginal pain. Negative for dysuria, pelvic pain and vaginal bleeding.    Musculoskeletal:  Negative for joint swelling and myalgias.   Integumentary:  Negative for rash.   Neurological:  Negative for vertigo, seizures, syncope and numbness.   Hematological:  Does not bruise/bleed easily.   Psychiatric/Behavioral:  Negative for depression. The patient is not nervous/anxious.         OBJECTIVE:     Physical Exam:  Physical Exam  Constitutional:       General: She is not in acute distress.     Appearance: She is well-developed.   HENT:      Head: Normocephalic and atraumatic.   Eyes:      Extraocular Movements: Extraocular movements intact.      Conjunctiva/sclera: Conjunctivae normal.   Pulmonary:      Effort: Pulmonary effort is normal. No respiratory distress.   Genitourinary:     Labia:         Right: Tenderness present. No rash, lesion or injury.         Left: Tenderness present. No rash, lesion or injury.           Comments: Adhesion of labia minora just beneath urethra- no urethral obstruction (overall appears unchanged); silver nitrate applied  Posterior lac well healed  Musculoskeletal:         General: Normal range of motion.      Right lower leg: No edema.      Left lower leg: No edema.   Skin:     General: Skin is warm and dry.   Neurological:      Mental Status: She is alert and oriented to person, place, and time.   Psychiatric:         Mood and Affect: Mood normal.         Behavior: Behavior normal.           ASSESSMENT:       ICD-10-CM ICD-9-CM    1. 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 Labial adhesion, acquired  N90.89 624.8       4. Anal or rectal pain  K62.89 569.42 Ambulatory referral/consult to Gastroenterology      5. Rectal bleeding  K62.5 569.3 Ambulatory referral/consult to Gastroenterology             Plan:     Vannesa was seen today for follow-up.    Diagnoses and all orders for this visit:    Postpartum care and examination   Ok to resume exercise, begin gradually    Breast hypoplasia    Labial adhesion,  acquired   Continue premarin nightly   Silver nitrate applied today   Will schedule f/u exam with office excision if needed   Continue pelvic rest    Anal or rectal pain  -     Ambulatory referral/consult to Gastroenterology; Future    Rectal bleeding  -     Ambulatory referral/consult to Gastroenterology; Future    Other orders  -     Discontinue: LIDOcaine HCL 2% (XYLOCAINE) 2 % jelly; Apply to affected area upon arrival to procedure  -     LIDOcaine HCL 2% (XYLOCAINE) 2 % jelly; Apply to affected area upon arrival to procedure    RTC for labial adhesion f/u      Vivienne Marshall

## 2023-09-11 ENCOUNTER — TELEPHONE (OUTPATIENT)
Dept: OBSTETRICS AND GYNECOLOGY | Facility: CLINIC | Age: 33
End: 2023-09-11
Payer: OTHER GOVERNMENT

## 2023-09-11 NOTE — TELEPHONE ENCOUNTER
Called pt in regards to left message. Pt stated she will message on the portal to schedule appt.   ----- Message from Heather Riley sent at 9/11/2023 11:40 AM CDT -----  Regarding: concerns  Name of Who is Calling:            What is the request in detail: Patient is requesting a message to her portal. She would like to find out what she need to do because she was suppose to have a procedure and now that the doctor is out for a month, what she's suppose to do. She is driving if she don't answer.            Can the clinic reply by [facility]: Yes           What Number to Call Back if not in [facility]: [phone]

## 2023-09-12 ENCOUNTER — PATIENT MESSAGE (OUTPATIENT)
Dept: OBSTETRICS AND GYNECOLOGY | Facility: CLINIC | Age: 33
End: 2023-09-12
Payer: OTHER GOVERNMENT

## 2023-09-21 ENCOUNTER — OFFICE VISIT (OUTPATIENT)
Dept: SURGERY | Facility: CLINIC | Age: 33
End: 2023-09-21
Payer: OTHER GOVERNMENT

## 2023-09-21 VITALS
DIASTOLIC BLOOD PRESSURE: 82 MMHG | SYSTOLIC BLOOD PRESSURE: 130 MMHG | WEIGHT: 228.38 LBS | HEART RATE: 80 BPM | BODY MASS INDEX: 38.99 KG/M2 | HEIGHT: 64 IN

## 2023-09-21 DIAGNOSIS — K62.5 RECTAL BLEEDING: ICD-10-CM

## 2023-09-21 DIAGNOSIS — K62.89 ANAL OR RECTAL PAIN: ICD-10-CM

## 2023-09-21 PROCEDURE — 99204 OFFICE O/P NEW MOD 45 MIN: CPT | Mod: S$PBB,,, | Performed by: NURSE PRACTITIONER

## 2023-09-21 PROCEDURE — 99999 PR PBB SHADOW E&M-EST. PATIENT-LVL V: CPT | Mod: PBBFAC,,, | Performed by: NURSE PRACTITIONER

## 2023-09-21 PROCEDURE — 99204 PR OFFICE/OUTPT VISIT, NEW, LEVL IV, 45-59 MIN: ICD-10-PCS | Mod: S$PBB,,, | Performed by: NURSE PRACTITIONER

## 2023-09-21 PROCEDURE — 99999 PR PBB SHADOW E&M-EST. PATIENT-LVL V: ICD-10-PCS | Mod: PBBFAC,,, | Performed by: NURSE PRACTITIONER

## 2023-09-21 PROCEDURE — 99215 OFFICE O/P EST HI 40 MIN: CPT | Mod: PBBFAC | Performed by: NURSE PRACTITIONER

## 2023-09-21 NOTE — PATIENT INSTRUCTIONS
anal fissures. We discussed that first line treatment for fissures is: softening stools with increased water intake and fiber supplementation, in addition to topical diltiazem/nifedipine.  This has a success rate of ~65-95%.   If this is not successful, surgical treatment options include Botox injection or lateral internal sphincterotomy (LIS).  Botox has a reported success rate of 40-70%, and can be associated with temporary changes in continence to gas/liquid stool.  LIS has a success rate of %, but is associated with changes in continence in 8-30% of patients at 6 years post-op.  These changes may be minor (I.e. Gas or liquid stool only).    Instead of dulcolax, try metamucil, miralax or colace instead

## 2023-09-21 NOTE — PROGRESS NOTES
"CRS Office Visit History and Physical    Referring Md:   Vivienne Marshall Md  [address] [place] [address]    SUBJECTIVE:     Chief Complaint: proctalgia and BRBPR    History of Present Illness:  The patient is new patient to this practice.   Course is as follows:  Patient is a 32 y.o. female presents with proctalgia and BRBPR.  Reports Gut issues all adult life, saw Gi for this about 2 years ago. C scope was mentioned then but not performed  What brings her here today is when she was 25 weeks pregnant, had urge to have BM but was unable to. When she finally did, had some mild bleeding. Then 4-5 weeks post partum felt that she was "pooping razor blades", bleeding on TP with wiping.   Started Rectiv mid August, also started dulcolax, bleeding and pain very much improved but if she has a hard stool it may feel like to pain/bleeding returns mildly    Last Colonoscopy: never  Family history of colorectal cancer or IBD: no.    Review of patient's allergies indicates:  No Known Allergies    Past Medical History:   Diagnosis Date    Anxiety 3/6/2023    Anxiety disorder, unspecified 08/2022    Chiari malformation type I     Seizures 2007    Grand mal once  EEG  photot sensitivty      Past Surgical History:   Procedure Laterality Date    KNEE ARTHROSCOPY W/ DEBRIDEMENT Left 2004    MCL tear     Widsom Teeth Extraction Bilateral      Family History   Problem Relation Age of Onset    Ovarian cancer Neg Hx     Colon cancer Neg Hx     Breast cancer Neg Hx      Social History     Tobacco Use    Smoking status: Never    Smokeless tobacco: Never   Substance Use Topics    Alcohol use: Not Currently     Comment: pre preg    Drug use: Not Currently     Types: Methamphetamines     Comment: prescribed ritalin for dx ADHD        Review of Systems:  ROS    OBJECTIVE:     Vital Signs (Most Recent)  /82 (BP Location: Left arm, Patient Position: Sitting, BP Method: Large (Automatic))   Pulse 80   Ht 5' 4" (1.626 m) "   Wt 103.6 kg (228 lb 6.3 oz)   BMI 39.20 kg/m²     Physical Exam:  General: White female in no distress   Neuro: Alert and oriented to person, place, and time.  Moves all extremities.     HEENT: No icterus.  Trachea midline  Respiratory: Respirations are even and unlabored, no cough or audible wheezing  Skin: Warm dry and intact, No visible rashes, no jaundice    Labs reviewed today:  Lab Results   Component Value Date    WBC 5.99 07/28/2023    HGB 12.9 07/28/2023    HCT 40.8 07/28/2023     07/28/2023    CHOL 200 (H) 05/12/2020    TRIG 89 05/12/2020    HDL 64 05/12/2020    ALT 34 07/28/2023    AST 19 07/28/2023     07/28/2023    K 4.3 07/28/2023     07/28/2023    CREATININE 1.0 07/28/2023    BUN 20 07/28/2023    CO2 25 07/28/2023    TSH 1.774 08/03/2022    INR 1.0 07/28/2023    HGBA1C 5.1 11/15/2022     Anorectal Exam:    Anal Skin:  likely healed posterior ML anal fissure, mildly TTP. No obvious fissure or hemorrhoid    Digital Rectal Exam:  deferred      ASSESSMENT/PLAN:     Vannesa was seen today for rectal pain.    Diagnoses and all orders for this visit:    Anal or rectal pain  -     Ambulatory referral/consult to Gastroenterology    Rectal bleeding  -     Ambulatory referral/consult to Gastroenterology  -     diltiazem HCl (DILTIAZEM 2% CREAM); Apply topically 3 (three) times daily. Apply topically to anal area.      32 y.o. F here for what sounds like a fissure. She was treated for a fissure by her OB, sx nearly resolved. She has chronic GI symptoms that a GI provider 2 years ago mentioned a c scope may be reasonable.   For her symptoms today related to a fissure that has healed with fissure treatment, not sure a c scope is necessary, but this was ordered for her to eval chronic GI symptoms if she would like    The patient was instructed to:  Try miralax or colace instead of dulcolax, metamucil was mentioned as well but I cautioned with its use due to gas  Trial dilt cream if fissure  returns since rectiv gave HA  C scope if she desires    F/u w MD if fissure returns    Mindi Arreola, FNP-C  Colon and Rectal Surgery

## 2023-09-22 ENCOUNTER — TELEPHONE (OUTPATIENT)
Dept: OBSTETRICS AND GYNECOLOGY | Facility: CLINIC | Age: 33
End: 2023-09-22
Payer: OTHER GOVERNMENT

## 2023-10-03 ENCOUNTER — PROCEDURE VISIT (OUTPATIENT)
Dept: OBSTETRICS AND GYNECOLOGY | Facility: CLINIC | Age: 33
End: 2023-10-03
Payer: OTHER GOVERNMENT

## 2023-10-03 VITALS — BODY MASS INDEX: 39.2 KG/M2 | HEIGHT: 64 IN

## 2023-10-03 DIAGNOSIS — N90.89: Primary | ICD-10-CM

## 2023-10-03 PROCEDURE — 56605 BIOPSY (GYNECOLOGICAL): ICD-10-PCS | Mod: S$PBB,,, | Performed by: OBSTETRICS & GYNECOLOGY

## 2023-10-03 PROCEDURE — 56605 BIOPSY OF VULVA/PERINEUM: CPT | Mod: PBBFAC,PN | Performed by: OBSTETRICS & GYNECOLOGY

## 2023-10-06 ENCOUNTER — TELEPHONE (OUTPATIENT)
Dept: ENDOSCOPY | Facility: HOSPITAL | Age: 33
End: 2023-10-06
Payer: OTHER GOVERNMENT

## 2023-10-06 NOTE — TELEPHONE ENCOUNTER
"----- Message -----   From: Mindi Arreola NP   Sent: 2023   1:56 PM CDT   To: [facility]     Procedure: Colonoscopy     Diagnosis: Rectal bleeding and Constipation     Procedure Timin-12 weeks     *If within 4 weeks selected, please amy as high priority*     *If greater than 12 weeks, please select "5-12 weeks" and delay sending until 2 months prior to requested date*     Provider: Any GI provider     Location: No Preference     Additional Scheduling Information: No scheduling concerns     Prep Specifications:Extended/Constipation prep     Is the patient taking a GLP-1 Agonist:no     Have you attached a patient to this message: yes      "

## 2023-10-08 NOTE — PROCEDURES
Biopsy (Gynecological)    Date/Time: 10/3/2023 11:00 AM    Performed by: Brittni Guan MD  Authorized by: Brittni Guan MD    Consent Done?:  Yes (Verbal)  Local anesthesia used?: Yes    Anesthesia:  Local infiltration  Local anesthetic:  Lidocaine 1% without epinephrine    Biopsy Location:  Vulva  Vulva:     # of lesions:  1      The skin adhesion was excised with a scalpel.  NO biopsy was done   Brittni Guan MD

## 2023-10-12 ENCOUNTER — OFFICE VISIT (OUTPATIENT)
Dept: FAMILY MEDICINE | Facility: CLINIC | Age: 33
End: 2023-10-12
Payer: OTHER GOVERNMENT

## 2023-10-12 VITALS
BODY MASS INDEX: 39.2 KG/M2 | HEART RATE: 76 BPM | HEIGHT: 64 IN | RESPIRATION RATE: 16 BRPM | SYSTOLIC BLOOD PRESSURE: 126 MMHG | DIASTOLIC BLOOD PRESSURE: 88 MMHG

## 2023-10-12 DIAGNOSIS — Z30.02: Primary | ICD-10-CM

## 2023-10-12 PROCEDURE — 99214 PR OFFICE/OUTPT VISIT, EST, LEVL IV, 30-39 MIN: ICD-10-PCS | Mod: S$PBB,,, | Performed by: FAMILY MEDICINE

## 2023-10-12 PROCEDURE — 99213 OFFICE O/P EST LOW 20 MIN: CPT | Mod: PBBFAC | Performed by: FAMILY MEDICINE

## 2023-10-12 PROCEDURE — 99214 OFFICE O/P EST MOD 30 MIN: CPT | Mod: S$PBB,,, | Performed by: FAMILY MEDICINE

## 2023-10-12 PROCEDURE — 99999 PR PBB SHADOW E&M-EST. PATIENT-LVL III: ICD-10-PCS | Mod: PBBFAC,,, | Performed by: FAMILY MEDICINE

## 2023-10-12 PROCEDURE — 99999 PR PBB SHADOW E&M-EST. PATIENT-LVL III: CPT | Mod: PBBFAC,,, | Performed by: FAMILY MEDICINE

## 2023-10-12 NOTE — PROGRESS NOTES
Subjective:       Patient ID: Vannesa Wilkins is a 32 y.o. female.    Chief Complaint: Follow-up (7 month follow up)    HPI  32 year old female comes in to discuss her anxiety revolving around childbearing after having a complicated and stressful pregnancy. She notes significant fibroids which caused pain and complications initially, elevated flts, concern for htn vs eclampsia. She feels she has gained more weigh than she should have and has had issues with losing. She was followed by mfm who mentioned her obesity was a risk factor in her pregnancy, and this has resonated with her.    PMH, PSH, ALLERGIES, SH, FH reviewed in nurse's notes above  Medications reconciled in the nurse's notes      Review of Systems   Constitutional:  Negative for chills and fever.   HENT:  Negative for congestion, ear pain, postnasal drip, rhinorrhea, sore throat and trouble swallowing.    Eyes:  Negative for redness and itching.   Respiratory:  Negative for cough, shortness of breath and wheezing.    Cardiovascular:  Negative for chest pain and palpitations.   Gastrointestinal:  Negative for abdominal pain, diarrhea, nausea and vomiting.   Genitourinary:  Negative for dysuria and frequency.   Skin:  Negative for rash.   Neurological:  Negative for weakness and headaches.       Objective:      Physical Exam  Vitals and nursing note reviewed.   Constitutional:       General: She is not in acute distress.     Appearance: She is well-developed.   HENT:      Head: Normocephalic and atraumatic.   Eyes:      Conjunctiva/sclera: Conjunctivae normal.      Pupils: Pupils are equal, round, and reactive to light.   Neck:      Thyroid: No thyromegaly.   Cardiovascular:      Rate and Rhythm: Normal rate and regular rhythm.      Heart sounds: Normal heart sounds.   Pulmonary:      Effort: Pulmonary effort is normal. No respiratory distress.      Breath sounds: Normal breath sounds. No wheezing.   Abdominal:      General: Bowel sounds are normal.       Palpations: Abdomen is soft.      Tenderness: There is no abdominal tenderness.   Musculoskeletal:         General: Normal range of motion.      Cervical back: Normal range of motion and neck supple.   Lymphadenopathy:      Cervical: No cervical adenopathy.   Skin:     General: Skin is warm and dry.      Findings: No rash.   Neurological:      Mental Status: She is alert and oriented to person, place, and time.   Psychiatric:         Behavior: Behavior normal.          Assessment/Plan:       Problem List Items Addressed This Visit    None  Visit Diagnoses       Family planning, natural methods to avoid pregnancy    -  Primary          Disucssed options for famliy planning including hormonal and non hormonal options.    Needs to monitor bp. If she is in fact a chronic hypertensive, needs treatment. At this time, she doesn't.    Discussed options for fibroid removal/definitive treatment.    Needs to follow up liver numbers.    Weight loss/diet/lifestyle discussed. Could  consider hba1c/insulin levels.    RTC if condition acutely worsens or any other concerns, otherwise RTC as scheduled    44 minutes spent in discussion

## 2023-10-18 ENCOUNTER — TELEPHONE (OUTPATIENT)
Dept: ENDOSCOPY | Facility: HOSPITAL | Age: 33
End: 2023-10-18
Payer: OTHER GOVERNMENT

## 2023-10-23 ENCOUNTER — OFFICE VISIT (OUTPATIENT)
Dept: URGENT CARE | Facility: CLINIC | Age: 33
End: 2023-10-23
Payer: OTHER GOVERNMENT

## 2023-10-23 ENCOUNTER — TELEPHONE (OUTPATIENT)
Dept: ENDOSCOPY | Facility: HOSPITAL | Age: 33
End: 2023-10-23
Payer: OTHER GOVERNMENT

## 2023-10-23 VITALS
BODY MASS INDEX: 37.26 KG/M2 | RESPIRATION RATE: 18 BRPM | SYSTOLIC BLOOD PRESSURE: 122 MMHG | OXYGEN SATURATION: 98 % | HEART RATE: 85 BPM | DIASTOLIC BLOOD PRESSURE: 86 MMHG | TEMPERATURE: 99 F | HEIGHT: 64 IN | WEIGHT: 218.25 LBS

## 2023-10-23 DIAGNOSIS — M25.472 LEFT ANKLE SWELLING: ICD-10-CM

## 2023-10-23 DIAGNOSIS — M79.672 LEFT FOOT PAIN: ICD-10-CM

## 2023-10-23 DIAGNOSIS — M25.572 ACUTE LEFT ANKLE PAIN: ICD-10-CM

## 2023-10-23 DIAGNOSIS — S99.922A FOOT INJURY, LEFT, INITIAL ENCOUNTER: Primary | ICD-10-CM

## 2023-10-23 PROCEDURE — 73630 X-RAY EXAM OF FOOT: CPT | Mod: LT,S$GLB,, | Performed by: RADIOLOGY

## 2023-10-23 PROCEDURE — 73630 XR FOOT COMPLETE 3 VIEW LEFT: ICD-10-PCS | Mod: LT,S$GLB,, | Performed by: RADIOLOGY

## 2023-10-23 PROCEDURE — 99213 OFFICE O/P EST LOW 20 MIN: CPT | Mod: S$GLB,,, | Performed by: NURSE PRACTITIONER

## 2023-10-23 PROCEDURE — 99213 PR OFFICE/OUTPT VISIT, EST, LEVL III, 20-29 MIN: ICD-10-PCS | Mod: S$GLB,,, | Performed by: NURSE PRACTITIONER

## 2023-10-23 NOTE — TELEPHONE ENCOUNTER
Contacted and spoke with patient. She states she spoke with her PCP and her PCP advised her that she doesn't need to have colonoscopy completed at this time

## 2023-10-23 NOTE — PROGRESS NOTES
"Subjective:      Patient ID: Vannesa Wilkins is a 32 y.o. female.    Vitals:  height is 5' 4" (1.626 m) and weight is 99 kg (218 lb 4.1 oz). Her oral temperature is 98.9 °F (37.2 °C). Her blood pressure is 122/86 and her pulse is 85. Her respiration is 18 and oxygen saturation is 98%.     Chief Complaint: Foot Injury    Patient rolled her foot about 8 days ago on her way to the bathroom. Anterior pain on the left foot. Patient only has taken ibuprofen. Mostly only hurt with weight-bearing.    32-year-old female presents to clinic with complaints of left foot injury 8 days ago with persistent pain worsen with weight bearing activity; treating with ibuprofen. She reports while walking in a pair of slides her ankle was not supported resulting in her foot coming half-way out the shoes twisting her ankle bringing her to her knees due to the pain. She continued to exercise, no compression, no ice, no elevation, pain rated at an 8 on a scale 0-10.     Foot Injury   The incident occurred more than 1 week ago. The incident occurred at home. The injury mechanism was an eversion injury and a fall. The pain is present in the left foot. The quality of the pain is described as aching and stabbing. The pain is at a severity of 8/10. The pain is moderate. The pain has been Constant since onset. Pertinent negatives include no numbness or tingling. She reports no foreign bodies present. The symptoms are aggravated by movement, weight bearing and palpation. She has tried ice and elevation for the symptoms.       Musculoskeletal:  Positive for pain, abnormal ROM of joint and muscle ache.   Neurological:  Negative for numbness and tingling.      Objective:     Physical Exam   Constitutional: She is oriented to person, place, and time. She appears well-developed. She is cooperative. No distress.   HENT:   Head: Normocephalic and atraumatic.   Nose: Nose normal.   Mouth/Throat: Oropharynx is clear and moist and mucous membranes are normal. "   Eyes: Conjunctivae and lids are normal. Extraocular movement intact   Neck: Trachea normal and phonation normal. Neck supple.   Cardiovascular: Normal rate and normal pulses.   Pulmonary/Chest: Effort normal.   Abdominal: Normal appearance.   Musculoskeletal:         General: No deformity.      Left ankle: She exhibits decreased range of motion and swelling. Tenderness. CF ligament tenderness found.        Legs:    Neurological: She is alert and oriented to person, place, and time. She has normal strength and normal reflexes. No sensory deficit.   Skin: Skin is warm, dry, intact and not diaphoretic.   Psychiatric: Her speech is normal and behavior is normal. Judgment and thought content normal.   Nursing note and vitals reviewed.      Assessment:     1. Foot injury, left, initial encounter    2. Left foot pain    3. Acute left ankle pain    4. Left ankle swelling        Plan:       Foot injury, left, initial encounter  -     X-Ray Foot Complete 3 view Left; Future; Expected date: 10/23/2023    Left foot pain  -     X-Ray Foot Complete 3 view Left; Future; Expected date: 10/23/2023  -     BANDAGE ELASTIC 3IN ACE    Acute left ankle pain  -     BANDAGE ELASTIC 3IN ACE    Left ankle swelling        X-Ray Foot Complete 3 view Left    Result Date: 10/23/2023  EXAMINATION: XR FOOT COMPLETE 3 VIEW LEFT CLINICAL HISTORY: .  Unspecified injury of left foot, initial encounter TECHNIQUE: AP, lateral and oblique views of the left foot were performed. COMPARISON: None FINDINGS: Normal mineralization and alignment. No acute fracture, no osseous lesions. No significant degenerative change. The soft tissues appear normal.  No radiopaque foreign body.     No acute process seen. Electronically signed by: Jamee Holder MD Date:    10/23/2023 Time:    10:28      Reviewed xray with patient who acknowledged results.  Discussed  Diagnosis and treatment plan with patient who verbalized understanding and agrees with plan of care.   Advised on the need to call and schedule a follow-up appointment with pcp with no resolve. Patient given educational handouts supporting this diagnosis as well.  Ace wrap applied, patient tolerated well.  Patient denies any further questions or concerns at this time.  Patient exits exam room in no acute distress.             Patient Instructions   Please drink plenty of fluids.  Please get plenty of rest.  Please return here or go to the Emergency Department for any concerns or worsening of condition.  If you were not prescribed an anti-inflammatory medication, and if you do not have any history of stomach/intestinal ulcers, or kidney disease, or are not taking a blood thinner such as Coumadin, Plavix, Pradaxa, Eloquis, or Xaralta for example, it is OK to take over the counter Ibuprofen or Advil or Motrin or Aleve as directed.  Do not take these medications on an empty stomach.  Rest, ice, compression and elevation to the affected joint or limb as needed.  Please follow up with your primary care doctor or specialist as needed.

## 2023-10-23 NOTE — PATIENT INSTRUCTIONS
Please drink plenty of fluids.  Please get plenty of rest.  Please return here or go to the Emergency Department for any concerns or worsening of condition.  If you were not prescribed an anti-inflammatory medication, and if you do not have any history of stomach/intestinal ulcers, or kidney disease, or are not taking a blood thinner such as Coumadin, Plavix, Pradaxa, Eloquis, or Xaralta for example, it is OK to take over the counter Ibuprofen or Advil or Motrin or Aleve as directed.  Do not take these medications on an empty stomach.  Rest, ice, compression and elevation to the affected joint or limb as needed.  Please follow up with your primary care doctor or specialist as needed.

## 2023-11-02 NOTE — PROGRESS NOTES
History & Physical  Gynecology      SUBJECTIVE:     Chief Complaint: Postpartum Care       History of Present Illness:  Vannesa Wilkins is a 32 y.o. now  about 6 weeks s/p . Hospital course was overall uncomplicated.   She is supplementing with formula as needed due to challenges with supply despite compliance with pumping rec. Seeing lactation.   Vaginal bleeding is improved.   Does have some vaginal/perineal discomfort and burning arsalan when urinating.   Reports her mood is pretty good overall- excellent support. EPDS 6.     Her pap smear is up to date.         Review of patient's allergies indicates:   Allergen Reactions    Hydrocodone-acetaminophen Nausea And Vomiting       Past Medical History:   Diagnosis Date    Anxiety 2023    Anxiety disorder, unspecified 2022    Asthma     Chiari malformation type I     Gestational hypertension 2023    Seizures 2007    Grand mal once  EEG  photot sensitivty      Past Surgical History:   Procedure Laterality Date    KNEE ARTHROSCOPY W/ DEBRIDEMENT Left     MCL tear     [place] Teeth Extraction Bilateral      OB History as of 2023          1    Para   1    Term   1       0    AB   0    Living   1         SAB   0    IAB   0    Ectopic   0    Multiple   0    Live Births   1               Family History   Problem Relation Age of Onset    Hypertension Mother     Cancer Father     Hypertension Maternal Grandmother     Cancer Maternal Grandmother     Ovarian cancer Neg Hx     Colon cancer Neg Hx     Breast cancer Neg Hx      Social History     Tobacco Use    Smoking status: Never    Smokeless tobacco: Never   Substance Use Topics    Alcohol use: Not Currently     Comment: pre preg    Drug use: Not Currently     Types: Methamphetamines     Comment: prescribed ritalin for dx ADHD       No current outpatient medications on file.     No current facility-administered medications for this visit.         Review of Systems:  Review of Systems    Constitutional:  Negative for chills and fever.   Respiratory:  Negative for cough, shortness of breath and wheezing.    Cardiovascular:  Negative for chest pain, palpitations and leg swelling.   Gastrointestinal:  Negative for abdominal pain, nausea and vomiting.   Endocrine: Negative for diabetes, hyperthyroidism and hypothyroidism.   Genitourinary:  Positive for dysuria and vaginal pain. Negative for pelvic pain and vaginal bleeding.   Musculoskeletal:  Negative for joint swelling and myalgias.   Integumentary:  Negative for rash.   Neurological:  Negative for vertigo, seizures, syncope and numbness.   Hematological:  Does not bruise/bleed easily.   Psychiatric/Behavioral:  Negative for depression. The patient is not nervous/anxious.         OBJECTIVE:     Physical Exam:  Physical Exam  Exam conducted with a chaperone present.   Constitutional:       General: She is not in acute distress.     Appearance: Normal appearance. She is well-developed.   HENT:      Head: Normocephalic and atraumatic.   Eyes:      Conjunctiva/sclera: Conjunctivae normal.   Pulmonary:      Effort: Pulmonary effort is normal. No respiratory distress.   Genitourinary:     General: Normal vulva.      Pubic Area: No rash.       Labia:         Right: No rash, tenderness or lesion.         Left: No rash, tenderness or lesion.       Urethra: No prolapse, urethral pain, urethral swelling or urethral lesion.      Vagina: No vaginal discharge, tenderness or bleeding.      Cervix: No cervical motion tenderness, discharge, friability or lesion.          Comments: The annotated areas above were palpated with cotton swab reproducing burning/discomfort described at rest and urinationa  Musculoskeletal:         General: No tenderness. Normal range of motion.      Right lower leg: No edema.      Left lower leg: No edema.   Skin:     General: Skin is warm and dry.      Findings: No erythema.   Neurological:      Mental Status: She is alert and oriented  to person, place, and time.   Psychiatric:         Mood and Affect: Mood normal.         Behavior: Behavior normal.           ASSESSMENT:       ICD-10-CM ICD-9-CM    1. Postpartum care and examination  Z39.2 V24.2       2. Labial adhesion, acquired  N90.89 624.8              Plan:      Recommend premarin x 4 weeks with labial adhesion and ongoing pelvic rest. Hold on pelvic floor PT. If remains present will consider chemical destruciton with silver nitrate and/or excision under local anesthesia in the office     Rtc 4 weeks    Vivienne Marshall

## 2023-12-05 ENCOUNTER — OFFICE VISIT (OUTPATIENT)
Dept: OBSTETRICS AND GYNECOLOGY | Facility: CLINIC | Age: 33
End: 2023-12-05
Payer: OTHER GOVERNMENT

## 2023-12-05 VITALS
HEIGHT: 64 IN | DIASTOLIC BLOOD PRESSURE: 86 MMHG | WEIGHT: 218.25 LBS | BODY MASS INDEX: 37.26 KG/M2 | SYSTOLIC BLOOD PRESSURE: 128 MMHG

## 2023-12-05 DIAGNOSIS — D25.1 INTRAMURAL UTERINE FIBROID: ICD-10-CM

## 2023-12-05 DIAGNOSIS — R74.8 ELEVATED LIVER ENZYMES: ICD-10-CM

## 2023-12-05 DIAGNOSIS — E04.1 THYROID NODULE: ICD-10-CM

## 2023-12-05 DIAGNOSIS — F41.8 POSTPARTUM ANXIETY: Primary | ICD-10-CM

## 2023-12-05 PROCEDURE — 99214 PR OFFICE/OUTPT VISIT, EST, LEVL IV, 30-39 MIN: ICD-10-PCS | Mod: S$PBB,,, | Performed by: STUDENT IN AN ORGANIZED HEALTH CARE EDUCATION/TRAINING PROGRAM

## 2023-12-05 PROCEDURE — 99999 PR PBB SHADOW E&M-EST. PATIENT-LVL III: ICD-10-PCS | Mod: PBBFAC,,, | Performed by: STUDENT IN AN ORGANIZED HEALTH CARE EDUCATION/TRAINING PROGRAM

## 2023-12-05 PROCEDURE — 99999 PR PBB SHADOW E&M-EST. PATIENT-LVL III: CPT | Mod: PBBFAC,,, | Performed by: STUDENT IN AN ORGANIZED HEALTH CARE EDUCATION/TRAINING PROGRAM

## 2023-12-05 PROCEDURE — 99214 OFFICE O/P EST MOD 30 MIN: CPT | Mod: S$PBB,,, | Performed by: STUDENT IN AN ORGANIZED HEALTH CARE EDUCATION/TRAINING PROGRAM

## 2023-12-05 PROCEDURE — 99213 OFFICE O/P EST LOW 20 MIN: CPT | Mod: PBBFAC | Performed by: STUDENT IN AN ORGANIZED HEALTH CARE EDUCATION/TRAINING PROGRAM

## 2023-12-05 RX ORDER — ESCITALOPRAM OXALATE 10 MG/1
10 TABLET ORAL DAILY
Qty: 30 TABLET | Refills: 2 | Status: SHIPPED | OUTPATIENT
Start: 2023-12-05 | End: 2024-03-21

## 2023-12-05 NOTE — PROGRESS NOTES
HPI:  Vannesa Wilkins is a 33 y.o.  here for the following:  - concerns for HARVEY/postparutm anxiety; therapy twice weekly no medications  - reports R sided pelvic pain with period; flow is not abnormally heavy   - due for follow up thyroid imaging; would like to do thyroid studies  - would like to do liver enzymes before hepatology appointment      ROS:  GENERAL: Feeling well overall. Denies fever or chills.   SKIN: Denies rash or lesions.   HEAD: Denies head injury or headache.   NODES: Denies enlarged lymph nodes.   CHEST: Denies chest pain or shortness of breath.   CARDIOVASCULAR: Denies palpitations or left sided chest pain.   ABDOMEN: No abdominal pain, constipation, diarrhea, nausea, vomiting or rectal bleeding.   URINARY: No dysuria, hematuria, or burning on urination.  REPRODUCTIVE: See HPI.   BREASTS: Denies pain, lumps, or nipple discharge.   HEMATOLOGIC: No easy bruisability or excessive bleeding.   MUSCULOSKELETAL: Denies joint pain or swelling.   NEUROLOGIC: Denies syncope or weakness.   PSYCHIATRIC: Denies depression, anxiety or mood swings.    PE:   APPEARANCE: Well nourished, well developed female in no acute distress.  RESPIRATORY: normal respiratory effort  EXTREMITIES: normal ROM, no edema bilaterally  NEURO: alert and oriented, normal gait  PSYCH: normal mood and affect      Diagnosis:  1. Postpartum anxiety    2. Elevated liver enzymes    3. Intramural uterine fibroid    4. Thyroid nodule        Plan:     Vannesa was seen today for fibroids.    Diagnoses and all orders for this visit:    Postpartum anxiety  -     EScitalopram oxalate (LEXAPRO) 10 MG tablet; Take 1 tablet (10 mg total) by mouth once daily.   Spent some time discussing mood sx postpartum and whether or not anxiety and stressors are likely to resolve with time. Also often postpartum symptoms reveal components of anxiety that were present before pregnancy. We will try low dose SSRI in addition to therapy and re-evaluate in 3 mos.      Elevated liver enzymes  -     HEPATIC FUNCTION PANEL; Future    Intramural uterine fibroid  -     US Pelvis Comp with Transvag NON-OB (xpd; Future   Discussed approach to fibroid management; given her menstrual pain we will image her fibroids to est post-pregnancy baseline     Thyroid nodule  -     US Soft Tissue Head Neck Thyroid; Future  -     TSH; Future  -     T4, FREE; Future  -     T3; Future  -     THYROID PEROXIDASE ANTIBODY; Future      Return for televisit or office visit 3 mos or sooner as needed    Vivienne Almanza MD  Obstetrics & Gynecology   [facility]

## 2024-01-04 ENCOUNTER — PATIENT MESSAGE (OUTPATIENT)
Dept: FAMILY MEDICINE | Facility: CLINIC | Age: 34
End: 2024-01-04
Payer: OTHER GOVERNMENT

## 2024-01-04 ENCOUNTER — PATIENT MESSAGE (OUTPATIENT)
Dept: OBSTETRICS AND GYNECOLOGY | Facility: CLINIC | Age: 34
End: 2024-01-04
Payer: OTHER GOVERNMENT

## 2024-01-05 NOTE — TELEPHONE ENCOUNTER
LOV 10/12/2023     Pt requesting RX refill for   ALPRAZolam (XANAX) 0.25 MG tablet      pharmacy confirmed   rx pending  please advise

## 2024-01-09 ENCOUNTER — HOSPITAL ENCOUNTER (OUTPATIENT)
Dept: RADIOLOGY | Facility: HOSPITAL | Age: 34
Discharge: HOME | End: 2024-01-09
Attending: STUDENT IN AN ORGANIZED HEALTH CARE EDUCATION/TRAINING PROGRAM
Payer: OTHER GOVERNMENT

## 2024-01-09 DIAGNOSIS — D25.1 INTRAMURAL UTERINE FIBROID: ICD-10-CM

## 2024-01-09 DIAGNOSIS — E04.1 THYROID NODULE: ICD-10-CM

## 2024-01-09 PROCEDURE — 76536 US EXAM OF HEAD AND NECK: CPT | Mod: TC

## 2024-01-09 PROCEDURE — 76830 TRANSVAGINAL US NON-OB: CPT | Mod: TC

## 2024-01-09 PROCEDURE — 76830 TRANSVAGINAL US NON-OB: CPT | Mod: 26,,, | Performed by: RADIOLOGY

## 2024-01-09 PROCEDURE — 76536 US EXAM OF HEAD AND NECK: CPT | Mod: 26,,, | Performed by: RADIOLOGY

## 2024-01-09 PROCEDURE — 76856 US EXAM PELVIC COMPLETE: CPT | Mod: 26,,, | Performed by: RADIOLOGY

## 2024-01-10 ENCOUNTER — OFFICE VISIT (OUTPATIENT)
Dept: HEPATOLOGY | Facility: CLINIC | Age: 34
End: 2024-01-10
Payer: OTHER GOVERNMENT

## 2024-01-10 ENCOUNTER — PATIENT MESSAGE (OUTPATIENT)
Dept: HEPATOLOGY | Facility: CLINIC | Age: 34
End: 2024-01-10

## 2024-01-10 ENCOUNTER — TELEPHONE (OUTPATIENT)
Dept: HEPATOLOGY | Facility: CLINIC | Age: 34
End: 2024-01-10
Payer: OTHER GOVERNMENT

## 2024-01-10 VITALS
RESPIRATION RATE: 18 BRPM | OXYGEN SATURATION: 98 % | BODY MASS INDEX: 36.32 KG/M2 | HEIGHT: 65 IN | DIASTOLIC BLOOD PRESSURE: 89 MMHG | SYSTOLIC BLOOD PRESSURE: 129 MMHG | HEART RATE: 82 BPM

## 2024-01-10 DIAGNOSIS — R79.89 ELEVATED LIVER FUNCTION TESTS: Primary | ICD-10-CM

## 2024-01-10 DIAGNOSIS — R74.8 ELEVATED LIVER ENZYMES: Primary | ICD-10-CM

## 2024-01-10 PROCEDURE — 99999 PR PBB SHADOW E&M-EST. PATIENT-LVL IV: CPT | Mod: PBBFAC,,, | Performed by: INTERNAL MEDICINE

## 2024-01-10 PROCEDURE — 99214 OFFICE O/P EST MOD 30 MIN: CPT | Mod: PBBFAC,PN | Performed by: INTERNAL MEDICINE

## 2024-01-10 PROCEDURE — 99214 OFFICE O/P EST MOD 30 MIN: CPT | Mod: S$PBB,,, | Performed by: INTERNAL MEDICINE

## 2024-01-10 NOTE — PATIENT INSTRUCTIONS
Consider vaccination for A and B viruses  Fibroscan   Liver panel every 3 months  Await response from radiologists re FB possible polyp  Return 6 months

## 2024-01-10 NOTE — PROGRESS NOTES
"HEPATOLOGY FOLLOW UP    Referring Physician:   Current Corresponding Physician: Jolie Roberson MD, Vivienne Almanza MD    Vannesa Wilkins is here for follow up of Elevated Hepatic Enzymes      HPI  Vannesa Wilkins is a 33 y.o. female with a hx of anxiety, Chiari malformation type 1 and chronic lower back pain who was 24 weeks pregnant when I saw her in consultation 3/7/23 for an evaluation of elevated liver enzymes. She was having ++N/V during pregnancy. Still on antiemetics.     Hx elevated liver enzymes:  -in college had "very high liver enzymes"; had abdo US  --5/12/20: ALT 25, AST 15, ALKP 49 (working with nutritionist-eating gluten-free and keto diet; mediterranean diet); felt healthy  --09/2021- no longer eating as well  --3/1/22: elevated liver tests in the ER  --10/5/22: ALT 80, AST 34, ALKP 62. Tbil 0.3  --11/8/22: ALT 79, AST 36, ALKP 48, Tbil <0.1  HCV Ab-, HBsAg-, (HBcIgM-, HAV IgM- previously), HIV-  --2/17/23: , AST 44, ALKP 51, Tbil 0.2 (normal prenatal visit - labs done due to high BP); no proteinuria; still has nausea if does not take unisom  --2/24/23: ALT 72, AST 25, ALKP 57, Tbil 0.2     --no pruritus  --no RUQ  --alcohol: none  --BMI: 28 (before pregnancy)  --denies any symptoms of decompensated cirrhosis, including no ascites or edema, cognitive problems that would suggest hepatic encephalopathy, or GI bleeding from varices.     Interval History  Impression at time of consult: currently 24weeks pregnant and has elevated liver enzymes. Her elevated liver enzymes predate pregancy and thus may be unrelated to her pregnant state. Etiologies such as NAFLD or autoimmune hepatitis are on the differential. She has had (and may still have) hyperemesis gravidarum may be contributing. Preeclampsia seems unlikely since although she has htn, she has no proteinuria. Other liver diseases in pregnancy unlikely: no pruritus and no elevated ALKP making ICP unlikely; only in second trimester and enzymes " coming down and no pain making acute fatty liver of pregnancy unlikely. HCV, HAV and HBV have bene ruled out by serologies. Symptomatic cholelithiasis unlikely since has no RUQ pain. I am recommended a hepatic panel every 2 weeks. I will check autoimmune markers now and a limited abdo US. Return 2 weeks by video visit.      Since Vannesa Wilkins's last few visits:  --has delivered 6/23    Labs 3/14/23: , AST 36, ALKP 62, Tbil 0.8  ALESIA-, ASMA-, AMA-, IgG normal  Labs 4/18/23: ALT 84, aST 35, ALKP 75, Tbil 0.1    Abdo US 3/14/23: normal liver; Gallbladder: 4 mm echogenic non mobile focus along the gallbladder wall demonstrating possible ring down artifact.  Findings are favored to represent adenomyomatosis versus adherent gallstone.  There is no gallbladder wall thickening or pericholecystic fluid.  No sonographic Gray's sign.    Feeling well. Nausea has improved; no abdo pain or diarrhea.    Outpatient Encounter Medications as of 1/10/2024   Medication Sig Dispense Refill    EScitalopram oxalate (LEXAPRO) 10 MG tablet Take 1 tablet (10 mg total) by mouth once daily. (Patient not taking: Reported on 1/10/2024) 30 tablet 2     No facility-administered encounter medications on file as of 1/10/2024.     Review of patient's allergies indicates:   Allergen Reactions    Hydrocodone-acetaminophen Nausea And Vomiting     Past Medical History:   Diagnosis Date    Anxiety 03/06/2023    Anxiety disorder, unspecified 08/2022    Asthma     Chiari malformation type I     Gestational hypertension 06/14/2023    Seizures 2007    Grand mal once  EEG  photot sensitivty        Review of Systems   Constitutional: Negative.    HENT: Negative.     Eyes: Negative.    Respiratory: Negative.     Cardiovascular: Negative.    Genitourinary: Negative.    Musculoskeletal: Negative.    Skin: Negative.    Neurological: Negative.    Psychiatric/Behavioral: Negative.       Vitals:    01/10/24 0911   BP: 129/89   Pulse: 82   Resp: 18        Physical Exam        Lab Results   Component Value Date    GLU 84 07/28/2023    BUN 20 07/28/2023    CREATININE 1.0 07/28/2023    CALCIUM 10.1 07/28/2023     07/28/2023    K 4.3 07/28/2023     07/28/2023    PROT 7.4 01/09/2024    CO2 25 07/28/2023    ANIONGAP 8 07/28/2023    WBC 5.99 07/28/2023    RBC 4.60 07/28/2023    HGB 12.9 07/28/2023    HCT 40.8 07/28/2023    MCV 89 07/28/2023    MCH 28.0 07/28/2023    MCHC 31.6 (L) 07/28/2023     Lab Results   Component Value Date    RDW 15.0 (H) 07/28/2023     07/28/2023    MPV 12.2 07/28/2023    GRAN 3.7 07/28/2023    GRAN 62.0 07/28/2023    LYMPH 1.6 07/28/2023    LYMPH 26.7 07/28/2023    MONO 0.4 07/28/2023    MONO 7.3 07/28/2023    EOSINOPHIL 3.5 07/28/2023    BASOPHIL 0.3 07/28/2023    EOS 0.2 07/28/2023    BASO 0.02 07/28/2023    GROUPTRH A POS 06/19/2023    CHOL 200 (H) 05/12/2020    TRIG 89 05/12/2020    HDL 64 05/12/2020    CHOLHDL 32.0 05/12/2020    TOTALCHOLEST 3.1 05/12/2020    ALBUMIN 4.3 01/09/2024    BILIDIR 0.1 01/09/2024    AST 17 01/09/2024    ALT 21 01/09/2024    ALKPHOS 78 01/09/2024    LABPROT 10.7 07/28/2023    INR 1.0 07/28/2023       Assessment and Plan:  Vannesa Wilkins is a 33 y.o. female 32 weeks pregnant. She continues with fluctating liver enzymes, etiology remains unclear. BP under good control. Autoimmune markers negative, making autoimmune hepatitis less likely.  Consider vaccination for A and B viruses  Fibroscan   Liver panel every 3 months  Await response from radiologists re FB possible polyp  Return 6 months  A total of 35 minutes was spent reviewing the patient's chart, examining the patient, reviewing labs and imaging and coordinating care with the patient's care team.

## 2024-01-10 NOTE — Clinical Note
2. Fibroscan  3. Liver panel every 3 months 4. Await response from radiologists re FB possible polyp 5. Return 6 months

## 2024-01-10 NOTE — TELEPHONE ENCOUNTER
----- Message from Carol Mcbride MD sent at 1/10/2024 10:06 AM CST -----  2. Fibroscan   3. Liver panel every 3 months  4. Await response from radiologists re FB possible polyp  5. Return 6 months

## 2024-01-12 RX ORDER — ALPRAZOLAM 0.25 MG/1
0.25 TABLET ORAL 3 TIMES DAILY PRN
Qty: 20 TABLET | Refills: 0 | Status: SHIPPED | OUTPATIENT
Start: 2024-01-12 | End: 2024-02-11

## 2024-02-27 ENCOUNTER — PATIENT MESSAGE (OUTPATIENT)
Dept: FAMILY MEDICINE | Facility: CLINIC | Age: 34
End: 2024-02-27
Payer: OTHER GOVERNMENT

## 2024-03-21 ENCOUNTER — OFFICE VISIT (OUTPATIENT)
Dept: FAMILY MEDICINE | Facility: CLINIC | Age: 34
End: 2024-03-21
Payer: OTHER GOVERNMENT

## 2024-03-21 DIAGNOSIS — F41.9 ANXIETY: ICD-10-CM

## 2024-03-21 DIAGNOSIS — O10.919 CHRONIC HYPERTENSION AFFECTING PREGNANCY: Primary | ICD-10-CM

## 2024-03-21 PROCEDURE — 99214 OFFICE O/P EST MOD 30 MIN: CPT | Mod: 95,,, | Performed by: FAMILY MEDICINE

## 2024-03-21 RX ORDER — AMITRIPTYLINE HYDROCHLORIDE 10 MG/1
10 TABLET, FILM COATED ORAL NIGHTLY
Qty: 30 TABLET | Refills: 5 | Status: SHIPPED | OUTPATIENT
Start: 2024-03-21 | End: 2025-03-21

## 2024-03-21 RX ORDER — ALPRAZOLAM 0.25 MG/1
0.25 TABLET ORAL 3 TIMES DAILY PRN
Qty: 20 TABLET | Refills: 0 | Status: SHIPPED | OUTPATIENT
Start: 2024-03-21 | End: 2024-05-17 | Stop reason: SDUPTHER

## 2024-03-21 NOTE — PROGRESS NOTES
Subjective:       Patient ID: Vannesa Wilkins is a 33 y.o. female.    Chief Complaint: No chief complaint on file.    History of Present Illness  The patient presents via virtual visit for medication refill.    32 minutes    Her last appointment with Dr. Love was at the end of 12/2023. She feels the months 4 through 6 postpartum were the hardest for her because they were emerging into real life. She was prescribed Lexapro by her ob/gyn. She is concerned about the side effects of Lexapro. She does not want to be on a daily medication. She has moments of panic and anxiety. She is working on her coping skills too. Most of her major anxiety moments are happening at night before she goes to bed. She does not have access to a lot of coping mechanisms during the day. She is in an overwhelming state. She never took Celexa. She does not remember taking amitriptyline. She took Unisom and B6 throughout her whole pregnancy and did not experience the drag afterwards. When she takes Unisom, she cannot function the next day. The Xanax 0.25 mg does help. She noticed a big shift in her anxiety during the day when she was on the labetalol in a positive way. She does not check her blood pressure at home anymore. The last time she was in the office, her blood pressure was 124/high 80s. She is not looking to get pregnant until next year.    PMH, PSH, ALLERGIES, SH, FH reviewed   Medications reconciled ]      Objective:      Physical Exam        Physical Exam  Constitutional:       Appearance: Normal appearance.   Pulmonary:      Effort: Pulmonary effort is normal. No respiratory distress.   Neurological:      Mental Status: She is alert.   Psychiatric:         Mood and Affect: Mood normal.         Thought Content: Thought content normal.         Results    Assessment:           1. Chronic hypertension affecting pregnancy    2. Anxiety        Plan:     Assessment & Plan  1. Anxiety.  She has a good understanding and good insight to her  anxiety. She has developed good coping skills. Amitriptyline was prescribed. Xanax was prescribed.    2. Hypertension.  Labetalol was an anxiolytic for her. We discussed beta blockers. With controlled bp, may consider propranolol.    Follow-up  The patient will follow up in 6 months.    Diagnoses and all orders for this visit:    Chronic hypertension affecting pregnancy    Anxiety  -     amitriptyline (ELAVIL) 10 MG tablet; Take 1 tablet (10 mg total) by mouth every evening.  -     ALPRAZolam (XANAX) 0.25 MG tablet; Take 1 tablet (0.25 mg total) by mouth 3 (three) times daily as needed for Anxiety.      The patient location is: home  The chief complaint leading to consultation is: anxiety/blood pressure    Visit type: audiovisual    Face to Face time with patient:   32 minutes of total time spent on the encounter, which includes face to face time and non-face to face time preparing to see the patient (eg, review of tests), Obtaining and/or reviewing separately obtained history, Documenting clinical information in the electronic or other health record, Independently interpreting results (not separately reported) and communicating results to the patient/family/caregiver, or Care coordination (not separately reported).         Each patient to whom he or she provides medical services by telemedicine is:  (1) informed of the relationship between the physician and patient and the respective role of any other health care provider with respect to management of the patient; and (2) notified that he or she may decline to receive medical services by telemedicine and may withdraw from such care at any time.    Notes:       RTC if condition acutely worsens or any other concerns, otherwise RTC as scheduled      This note was generated with the assistance of ambient listening technology. Verbal consent was obtained by the patient and accompanying visitor(s) for the recording of patient appointment to facilitate this note. I attest  to having reviewed and edited the generated note for accuracy, though some syntax or spelling errors may persist. Please contact the author of this note for any clarification.    Answers submitted by the patient for this visit:  Review of Systems Questionnaire (Submitted on 3/21/2024)  activity change: No  unexpected weight change: No  neck pain: No  hearing loss: No  rhinorrhea: No  trouble swallowing: No  eye discharge: No  visual disturbance: No  chest tightness: No  wheezing: No  chest pain: No  palpitations: No  blood in stool: No  constipation: No  vomiting: No  diarrhea: No  polydipsia: No  polyuria: No  difficulty urinating: No  hematuria: No  menstrual problem: No  dysuria: No  joint swelling: No  arthralgias: No  headaches: No  weakness: No  confusion: No  dysphoric mood: No

## 2024-05-17 DIAGNOSIS — F41.9 ANXIETY: ICD-10-CM

## 2024-05-17 NOTE — TELEPHONE ENCOUNTER
No care due was identified.  [facility] Embedded Care Due Messages. Reference number: [id number].   5/17/2024 5:18:44 PM CDT

## 2024-05-21 RX ORDER — ALPRAZOLAM 0.25 MG/1
0.25 TABLET ORAL 3 TIMES DAILY PRN
Qty: 20 TABLET | Refills: 0 | Status: SHIPPED | OUTPATIENT
Start: 2024-05-21 | End: 2024-06-20

## 2024-07-19 ENCOUNTER — OFFICE VISIT (OUTPATIENT)
Dept: OBSTETRICS AND GYNECOLOGY | Facility: CLINIC | Age: 34
End: 2024-07-19
Payer: OTHER GOVERNMENT

## 2024-07-19 VITALS
HEIGHT: 65 IN | DIASTOLIC BLOOD PRESSURE: 82 MMHG | BODY MASS INDEX: 36.36 KG/M2 | SYSTOLIC BLOOD PRESSURE: 126 MMHG | WEIGHT: 218.25 LBS

## 2024-07-19 DIAGNOSIS — F41.1 GAD (GENERALIZED ANXIETY DISORDER): ICD-10-CM

## 2024-07-19 DIAGNOSIS — T38.4X5D ADVERSE EFFECT OF ORAL CONTRACEPTIVE, SUBSEQUENT ENCOUNTER: ICD-10-CM

## 2024-07-19 DIAGNOSIS — F32.81 PMDD (PREMENSTRUAL DYSPHORIC DISORDER): Primary | ICD-10-CM

## 2024-07-19 DIAGNOSIS — Z30.09 BIRTH CONTROL COUNSELING: ICD-10-CM

## 2024-07-19 PROCEDURE — 99215 OFFICE O/P EST HI 40 MIN: CPT | Mod: S$PBB,,, | Performed by: STUDENT IN AN ORGANIZED HEALTH CARE EDUCATION/TRAINING PROGRAM

## 2024-07-19 PROCEDURE — 99999 PR PBB SHADOW E&M-EST. PATIENT-LVL III: CPT | Mod: PBBFAC,,, | Performed by: STUDENT IN AN ORGANIZED HEALTH CARE EDUCATION/TRAINING PROGRAM

## 2024-07-19 PROCEDURE — 99213 OFFICE O/P EST LOW 20 MIN: CPT | Mod: PBBFAC | Performed by: STUDENT IN AN ORGANIZED HEALTH CARE EDUCATION/TRAINING PROGRAM

## 2024-07-19 RX ORDER — CHLORHEXIDINE GLUCONATE ORAL RINSE 1.2 MG/ML
15 SOLUTION DENTAL 2 TIMES DAILY
COMMUNITY
Start: 2024-04-06

## 2024-07-19 RX ORDER — SERTRALINE HYDROCHLORIDE 25 MG/1
25 TABLET, FILM COATED ORAL DAILY
Qty: 30 TABLET | Refills: 11 | Status: SHIPPED | OUTPATIENT
Start: 2024-07-19 | End: 2025-07-19

## 2024-07-19 RX ORDER — IBUPROFEN 800 MG/1
800 TABLET ORAL 3 TIMES DAILY
COMMUNITY
Start: 2024-04-06

## 2024-07-19 RX ORDER — LACTIC ACID, L-, CITRIC ACID MONOHYDRATE, AND POTASSIUM BITARTRATE 90; 50; 20 MG/5G; MG/5G; MG/5G
1 GEL VAGINAL
Qty: 60 G | Refills: 11 | Status: SHIPPED | OUTPATIENT
Start: 2024-07-19

## 2024-07-19 NOTE — PROGRESS NOTES
HPI:  Vannesa Wilkins is a 33 y.o.  here to address menstrual cycle concerns.   Has sporadic episodes 5-7 days long where she does not feel like herself at all. Depression/anxiety sx exponentially worse and her coping strategies not effective. Recently has noticed this coincides with the week before her period.   Hx HARVEY on xanax PRN and also manages with non-pharmacologic strategies. Has historically been fearful of starting SSRI.   She wants to conceive again in about a year.  Pregnancy was difficult for her and she wants to set up for success by optimizing mental and physical wellbeing.     ROS:  GENERAL: Feeling well overall. Denies fever or chills.   SKIN: Denies rash or lesions.   HEAD: Denies head injury or headache.   NODES: Denies enlarged lymph nodes.   CHEST: Denies chest pain or shortness of breath.   CARDIOVASCULAR: Denies palpitations or left sided chest pain.   ABDOMEN: No abdominal pain, constipation, diarrhea, nausea, vomiting or rectal bleeding.   URINARY: No dysuria, hematuria, or burning on urination.  REPRODUCTIVE: See HPI.   BREASTS: Denies pain, lumps, or nipple discharge.   HEMATOLOGIC: No easy bruisability or excessive bleeding.   MUSCULOSKELETAL: Denies joint pain or swelling.   NEUROLOGIC: Denies syncope or weakness.   PSYCHIATRIC: Denies depression, anxiety or mood swings.    PE:   APPEARANCE: Well nourished, well developed female in no acute distress.  RESPIRATORY: normal respiratory effort  EXTREMITIES: normal ROM, no edema bilaterally  NEURO: alert and oriented, normal gait  PSYCH: normal mood and affect      Diagnosis:  1. PMDD (premenstrual dysphoric disorder)    2. Adverse effect of oral contraceptive, subsequent encounter    3. Birth control counseling    4. HARVEY (generalized anxiety disorder)        Plan:     Orders Placed This Encounter    sertraline (ZOLOFT) 25 MG tablet    lactic acid-citric-potassium (PHEXXI) 1.8-1-0.4 % Gel      We discussed women with PMDD become  serotonin-deficient in the days before period and during beginning/throughout cycle, often improving towards the end of the period. First line treatment is SSRI therapy either continuously or cyclical/luteal phase. As many women with PMDD may have underlying mood d/o unrelated to period, women who opt for luteal phase treatment alone are at risk for untreated depression/anxiety during follicular phase. For this reason I recommend starting daily SSRI. R/b/a and anticipated effects reviewed.  We also discussed that in women who desire contraception, COCs can be effective in disrupting HPO axis and subsequent hormonal fluctuations that may be contributing to sx. Many of these women however are undertreated and will ultimately need SSRI as well.  She does NOT want to be on birth control.     Follow-up with me in 6-8 weeks.     I spent a total of 44 minutes on the day of the visit.  This includes face to face time and non-face to face time preparing to see the patient (eg, review of tests), obtaining and/or reviewing separately obtained history, documenting clinical information in the electronic or other health record, independently interpreting results and communicating results to the patient/family/caregiver, or care coordinator.

## 2024-07-31 DIAGNOSIS — O10.919 CHRONIC HYPERTENSION AFFECTING PREGNANCY: ICD-10-CM

## 2024-08-07 ENCOUNTER — PATIENT MESSAGE (OUTPATIENT)
Dept: OBSTETRICS AND GYNECOLOGY | Facility: CLINIC | Age: 34
End: 2024-08-07
Payer: OTHER GOVERNMENT

## 2024-08-17 DIAGNOSIS — F32.81 PMDD (PREMENSTRUAL DYSPHORIC DISORDER): ICD-10-CM

## 2024-08-17 DIAGNOSIS — F41.1 GAD (GENERALIZED ANXIETY DISORDER): ICD-10-CM

## 2024-08-18 NOTE — TELEPHONE ENCOUNTER
Refill Routing Note   Medication(s) are not appropriate for processing by [facility] for the following reason(s):        New or recently adjusted medication  Should have refills remaining    [facility] action(s):  Defer        Medication Therapy Plan: >7days since last order sent warrants reassessment. New start med, however patient should have 11 refills remaining. Sent her a message to check with Eliz.    Medication reconciliation completed: Yes     Appointments  past 12m or future 3m with PCP    Date Provider   Last Visit   7/19/2024 Vivienne Almanza MD   Next Visit   9/10/2024 Vivienne Almanza MD   ED visits in past 90 days: 0        Note composed:11:32 AM 08/18/2024

## 2024-08-19 RX ORDER — SERTRALINE HYDROCHLORIDE 25 MG/1
25 TABLET, FILM COATED ORAL DAILY
Qty: 30 TABLET | Refills: 11 | Status: SHIPPED | OUTPATIENT
Start: 2024-08-19 | End: 2025-08-19

## 2024-09-09 ENCOUNTER — PATIENT MESSAGE (OUTPATIENT)
Dept: OBSTETRICS AND GYNECOLOGY | Facility: CLINIC | Age: 34
End: 2024-09-09
Payer: OTHER GOVERNMENT

## 2024-09-10 ENCOUNTER — OFFICE VISIT (OUTPATIENT)
Dept: OBSTETRICS AND GYNECOLOGY | Facility: CLINIC | Age: 34
End: 2024-09-10
Payer: OTHER GOVERNMENT

## 2024-09-10 DIAGNOSIS — F41.1 GAD (GENERALIZED ANXIETY DISORDER): ICD-10-CM

## 2024-09-10 DIAGNOSIS — F32.81 PMDD (PREMENSTRUAL DYSPHORIC DISORDER): Primary | ICD-10-CM

## 2024-09-10 PROCEDURE — 99213 OFFICE O/P EST LOW 20 MIN: CPT | Mod: 95,,, | Performed by: STUDENT IN AN ORGANIZED HEALTH CARE EDUCATION/TRAINING PROGRAM
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
## 2024-09-13 ENCOUNTER — PATIENT MESSAGE (OUTPATIENT)
Dept: FAMILY MEDICINE | Facility: CLINIC | Age: 34
End: 2024-09-13

## 2024-09-13 ENCOUNTER — OFFICE VISIT (OUTPATIENT)
Dept: FAMILY MEDICINE | Facility: CLINIC | Age: 34
End: 2024-09-13
Payer: OTHER GOVERNMENT

## 2024-09-13 VITALS
HEIGHT: 65 IN | BODY MASS INDEX: 36.32 KG/M2 | HEART RATE: 88 BPM | SYSTOLIC BLOOD PRESSURE: 128 MMHG | RESPIRATION RATE: 16 BRPM | DIASTOLIC BLOOD PRESSURE: 84 MMHG

## 2024-09-13 DIAGNOSIS — T63.421A FIRE ANT BITE, ACCIDENTAL OR UNINTENTIONAL, INITIAL ENCOUNTER: Primary | ICD-10-CM

## 2024-09-13 PROCEDURE — 99212 OFFICE O/P EST SF 10 MIN: CPT | Mod: PBBFAC | Performed by: FAMILY MEDICINE

## 2024-09-13 PROCEDURE — 99999 PR PBB SHADOW E&M-EST. PATIENT-LVL II: CPT | Mod: PBBFAC,,, | Performed by: FAMILY MEDICINE

## 2024-09-13 RX ORDER — HYDROXYZINE HYDROCHLORIDE 25 MG/1
25 TABLET, FILM COATED ORAL 3 TIMES DAILY PRN
Qty: 20 TABLET | Refills: 2 | Status: SHIPPED | OUTPATIENT
Start: 2024-09-13

## 2024-09-13 RX ORDER — TRIAMCINOLONE ACETONIDE 1 MG/G
CREAM TOPICAL 2 TIMES DAILY
Qty: 90 G | Refills: 0 | Status: SHIPPED | OUTPATIENT
Start: 2024-09-13

## 2024-09-13 RX ORDER — CETIRIZINE HYDROCHLORIDE 10 MG/1
10 TABLET ORAL DAILY
Qty: 20 TABLET | Refills: 1 | Status: SHIPPED | OUTPATIENT
Start: 2024-09-13 | End: 2025-09-13

## 2024-09-13 RX ORDER — PREDNISONE 20 MG/1
20 TABLET ORAL DAILY
Qty: 5 TABLET | Refills: 0 | Status: SHIPPED | OUTPATIENT
Start: 2024-09-13 | End: 2024-09-18

## 2024-09-13 RX ORDER — MUPIROCIN 20 MG/G
OINTMENT TOPICAL 2 TIMES DAILY
Qty: 22 G | Refills: 3 | Status: SHIPPED | OUTPATIENT
Start: 2024-09-13

## 2024-09-13 NOTE — PROGRESS NOTES
Subjective:       Patient ID: Vannesa Wilkins is a 33 y.o. female.    Chief Complaint: Insect Bite (Patient was bit by an ant on her left ankle and has swelling and redness)    History of Present Illness  The patient presents for evaluation of multiple medical concerns.    She reports experiencing severe, stabbing pain in her breast, which she describes as a cluster of lymph nodes. This pain is unilateral and coincides with her ovulation period. She has a wellness check scheduled for 11/2024. She was previously diagnosed with Premenstrual Dysphoric Disorder (PMDD) by Dr. Almanza and was prescribed Zoloft, which she found beneficial. She plans to start trying to conceive again next year and wishes to monitor her blood pressure and undergo lab tests. She has a follow-up appointment with Dr. Almanza in 02/2025 for obstetric checks.    She also reports a reaction to an ant sting, which occurred last night. This is the third such incident within a month, with the previous two requiring urgent care visits. The most recent episode resulted in significant swelling and loss of ankle mobility. She was treated with antibiotics and steroids due to suspected infection. These reactions typically occur 2 to 3 days post-sting. She recalls a similar incident in 2022 when she stepped on an anthill and sustained approximately 60 bites, after which she began experiencing these reactions.        Objective:      Physical Exam        Physical Exam  Vitals and nursing note reviewed.   Constitutional:       General: She is not in acute distress.     Appearance: She is well-developed.   HENT:      Head: Normocephalic and atraumatic.   Eyes:      Conjunctiva/sclera: Conjunctivae normal.      Pupils: Pupils are equal, round, and reactive to light.   Neck:      Thyroid: No thyromegaly.   Cardiovascular:      Rate and Rhythm: Normal rate and regular rhythm.      Heart sounds: Normal heart sounds.   Pulmonary:      Effort: Pulmonary effort is normal.  No respiratory distress.      Breath sounds: Normal breath sounds. No wheezing.   Abdominal:      General: Bowel sounds are normal.      Palpations: Abdomen is soft.      Tenderness: There is no abdominal tenderness.   Musculoskeletal:         General: Normal range of motion.      Cervical back: Normal range of motion and neck supple.   Lymphadenopathy:      Cervical: No cervical adenopathy.   Skin:     General: Skin is warm and dry.      Findings: No rash.      Comments: Left ankle with erythema and central papule   Neurological:      Mental Status: She is alert and oriented to person, place, and time.   Psychiatric:         Behavior: Behavior normal.         Results    Assessment:           1. Fire ant bite, accidental or unintentional, initial encounter        Plan:     Assessment & Plan  1. Ant bite.  Symptoms are consistent with an allergic reaction to an ant bite. A prescription for hydroxyzine (Atarax) and Zyrtec will be provided, along with a topical steroid cream (triamcinolone). She is advised to apply the cream immediately upon noticing any redness or swelling. If symptoms persist or worsen, she should follow up.    2. Breast pain.  The pain is likely due to fibrocystic changes, which are known to worsen during the middle of the menstrual cycle and with caffeine intake. Vitamin E is recommended for application directly on the breast to alleviate tenderness. She is also advised to limit her caffeine intake. If the pain persists or if a lump is felt and it grows without pain, further evaluation will be necessary.    3. Medication Management.  She is currently on sertraline (Zoloft) for PMDD, which has been effective. She plans to follow up with Dr. Almanza in February for OB checks and to review her blood pressure and labs.        Vannesa was seen today for insect bite.    Diagnoses and all orders for this visit:    Fire ant bite, accidental or unintentional, initial encounter  -     hydrOXYzine HCL (ATARAX)  25 MG tablet; Take 1 tablet (25 mg total) by mouth 3 (three) times daily as needed for Itching (ant bite).  -     cetirizine (ZYRTEC) 10 MG tablet; Take 1 tablet (10 mg total) by mouth once daily.  -     triamcinolone acetonide 0.1% (KENALOG) 0.1 % cream; Apply topically 2 (two) times daily.  -     mupirocin (BACTROBAN) 2 % ointment; Apply topically 2 (two) times daily.    Other orders  -     predniSONE (DELTASONE) 20 MG tablet; Take 1 tablet (20 mg total) by mouth once daily. for 5 days          RTC if condition acutely worsens or any other concerns, otherwise RTC as scheduled      This note was generated with the assistance of ambient listening technology. Verbal consent was obtained by the patient and accompanying visitor(s) for the recording of patient appointment to facilitate this note. I attest to having reviewed and edited the generated note for accuracy, though some syntax or spelling errors may persist. Please contact the author of this note for any clarification.

## 2024-10-21 ENCOUNTER — PATIENT MESSAGE (OUTPATIENT)
Dept: FAMILY MEDICINE | Facility: CLINIC | Age: 34
End: 2024-10-21
Payer: OTHER GOVERNMENT

## 2024-10-22 ENCOUNTER — PATIENT MESSAGE (OUTPATIENT)
Dept: OBSTETRICS AND GYNECOLOGY | Facility: CLINIC | Age: 34
End: 2024-10-22
Payer: OTHER GOVERNMENT

## 2024-11-04 ENCOUNTER — PATIENT MESSAGE (OUTPATIENT)
Dept: FAMILY MEDICINE | Facility: CLINIC | Age: 34
End: 2024-11-04
Payer: OTHER GOVERNMENT

## 2024-11-04 DIAGNOSIS — N60.12 FIBROCYSTIC BREAST CHANGES OF BOTH BREASTS: Primary | ICD-10-CM

## 2024-11-04 DIAGNOSIS — N60.11 FIBROCYSTIC BREAST CHANGES OF BOTH BREASTS: Primary | ICD-10-CM

## 2024-11-07 ENCOUNTER — OFFICE VISIT (OUTPATIENT)
Dept: FAMILY MEDICINE | Facility: CLINIC | Age: 34
End: 2024-11-07
Payer: OTHER GOVERNMENT

## 2024-11-07 ENCOUNTER — CLINICAL SUPPORT (OUTPATIENT)
Dept: FAMILY MEDICINE | Facility: CLINIC | Age: 34
End: 2024-11-07
Payer: OTHER GOVERNMENT

## 2024-11-07 ENCOUNTER — PATIENT MESSAGE (OUTPATIENT)
Dept: FAMILY MEDICINE | Facility: CLINIC | Age: 34
End: 2024-11-07

## 2024-11-07 VITALS
BODY MASS INDEX: 36.32 KG/M2 | HEIGHT: 65 IN | DIASTOLIC BLOOD PRESSURE: 86 MMHG | SYSTOLIC BLOOD PRESSURE: 132 MMHG | HEART RATE: 76 BPM | RESPIRATION RATE: 16 BRPM

## 2024-11-07 DIAGNOSIS — F41.1 GAD (GENERALIZED ANXIETY DISORDER): ICD-10-CM

## 2024-11-07 DIAGNOSIS — N63.11 MASS OF UPPER OUTER QUADRANT OF RIGHT BREAST: ICD-10-CM

## 2024-11-07 DIAGNOSIS — L30.1 DYSHIDROTIC ECZEMA: ICD-10-CM

## 2024-11-07 DIAGNOSIS — F41.1 GAD (GENERALIZED ANXIETY DISORDER): Primary | ICD-10-CM

## 2024-11-07 LAB
ALBUMIN SERPL BCP-MCNC: 4 G/DL (ref 3.5–5.2)
ALP SERPL-CCNC: 68 U/L (ref 40–150)
ALT SERPL W/O P-5'-P-CCNC: 23 U/L (ref 10–44)
ANION GAP SERPL CALC-SCNC: 7 MMOL/L (ref 8–16)
AST SERPL-CCNC: 15 U/L (ref 10–40)
BASOPHILS # BLD AUTO: 0.05 K/UL (ref 0–0.2)
BASOPHILS NFR BLD: 0.9 % (ref 0–1.9)
BILIRUB SERPL-MCNC: 0.3 MG/DL (ref 0.1–1)
BUN SERPL-MCNC: 17 MG/DL (ref 6–20)
CALCIUM SERPL-MCNC: 9.4 MG/DL (ref 8.7–10.5)
CHLORIDE SERPL-SCNC: 107 MMOL/L (ref 95–110)
CO2 SERPL-SCNC: 25 MMOL/L (ref 23–29)
CREAT SERPL-MCNC: 0.8 MG/DL (ref 0.5–1.4)
DIFFERENTIAL METHOD BLD: NORMAL
EOSINOPHIL # BLD AUTO: 0.3 K/UL (ref 0–0.5)
EOSINOPHIL NFR BLD: 5.6 % (ref 0–8)
ERYTHROCYTE [DISTWIDTH] IN BLOOD BY AUTOMATED COUNT: 12.6 % (ref 11.5–14.5)
EST. GFR  (NO RACE VARIABLE): >60 ML/MIN/1.73 M^2
GLUCOSE SERPL-MCNC: 88 MG/DL (ref 70–110)
HCT VFR BLD AUTO: 42.9 % (ref 37–48.5)
HGB BLD-MCNC: 14.2 G/DL (ref 12–16)
IMM GRANULOCYTES # BLD AUTO: 0.01 K/UL (ref 0–0.04)
IMM GRANULOCYTES NFR BLD AUTO: 0.2 % (ref 0–0.5)
LYMPHOCYTES # BLD AUTO: 2.2 K/UL (ref 1–4.8)
LYMPHOCYTES NFR BLD: 38 % (ref 18–48)
MCH RBC QN AUTO: 30.1 PG (ref 27–31)
MCHC RBC AUTO-ENTMCNC: 33.1 G/DL (ref 32–36)
MCV RBC AUTO: 91 FL (ref 82–98)
MONOCYTES # BLD AUTO: 0.4 K/UL (ref 0.3–1)
MONOCYTES NFR BLD: 6.3 % (ref 4–15)
NEUTROPHILS # BLD AUTO: 2.8 K/UL (ref 1.8–7.7)
NEUTROPHILS NFR BLD: 49 % (ref 38–73)
NRBC BLD-RTO: 0 /100 WBC
PLATELET # BLD AUTO: 236 K/UL (ref 150–450)
PMV BLD AUTO: 11.6 FL (ref 9.2–12.9)
POTASSIUM SERPL-SCNC: 4.4 MMOL/L (ref 3.5–5.1)
PROT SERPL-MCNC: 7 G/DL (ref 6–8.4)
RBC # BLD AUTO: 4.71 M/UL (ref 4–5.4)
SODIUM SERPL-SCNC: 139 MMOL/L (ref 136–145)
TSH SERPL DL<=0.005 MIU/L-ACNC: 1.48 UIU/ML (ref 0.4–4)
WBC # BLD AUTO: 5.74 K/UL (ref 3.9–12.7)

## 2024-11-07 PROCEDURE — 99999 PR PBB SHADOW E&M-EST. PATIENT-LVL IV: CPT | Mod: PBBFAC,,, | Performed by: FAMILY MEDICINE

## 2024-11-07 PROCEDURE — 99999PBSHW PR PBB SHADOW TECHNICAL ONLY FILED TO HB: Mod: PBBFAC,,,

## 2024-11-07 PROCEDURE — 80053 COMPREHEN METABOLIC PANEL: CPT | Performed by: FAMILY MEDICINE

## 2024-11-07 PROCEDURE — 99214 OFFICE O/P EST MOD 30 MIN: CPT | Mod: S$PBB,,, | Performed by: FAMILY MEDICINE

## 2024-11-07 PROCEDURE — 84443 ASSAY THYROID STIM HORMONE: CPT | Performed by: FAMILY MEDICINE

## 2024-11-07 PROCEDURE — 36415 COLL VENOUS BLD VENIPUNCTURE: CPT | Mod: PBBFAC

## 2024-11-07 PROCEDURE — 99214 OFFICE O/P EST MOD 30 MIN: CPT | Mod: PBBFAC | Performed by: FAMILY MEDICINE

## 2024-11-07 PROCEDURE — 85025 COMPLETE CBC W/AUTO DIFF WBC: CPT | Performed by: FAMILY MEDICINE

## 2024-11-07 RX ORDER — CLOBETASOL PROPIONATE 0.5 MG/G
OINTMENT TOPICAL 2 TIMES DAILY
Qty: 60 G | Refills: 0 | Status: SHIPPED | OUTPATIENT
Start: 2024-11-07

## 2024-11-07 NOTE — PROGRESS NOTES
Subjective:       Patient ID: Vannesa Wilkins is a 33 y.o. female.    Chief Complaint: Annual Exam    History of Present Illness  The patient presents for evaluation of multiple medical concerns.    She reports feeling unwell, which she attributes to a possible viral infection contracted from her child who attends . She began taking Zoloft in early August 2023 and contracted COVID-19 at the end of the same month. Since then, she has been experiencing daily nausea, which she does not believe is due to pregnancy as she has had multiple menstrual cycles and negative pregnancy tests. She has not vomited but feels nauseous around lunchtime, attributing this to low blood sugar levels. She has been taking Xanax for sleep and Unisom for nausea, which she finds helpful.    She also reports fatigue, describing a need to lie down, a symptom that started after her COVID-19 infection. Additionally, she reports feeling anxious and experiencing brain fog, which she believes may be due to her medication. She is currently taking Zoloft 25 mg and reports feeling better since starting the medication.    She has a history of subchorionic hemorrhage during pregnancy, which she believes may contribute to her anxiety. She feels anxious about her health, particularly about the possibility of having a brain tumor, and also feels anxious about her daughter and the possibility of something happening to her.    She reports a rash on her hands, which she suspects may be eczema or a reaction to laundry detergent. The rash is more severe on her left hand, which she uses more frequently, and she describes it as painful. She has stopped using her skincare products and soap and has noticed that washing her hair seems to exacerbate the rash.    She also reports feeling a lymph node in her armpit, which she does not shave due to a previous bad reaction. She has reduced her coffee intake and uses [employer] Sensitive Skin deodorant due to skin  irritation.    FAMILY HISTORY  She has a family history of breast cancer in her great aunt.    Objective:      Physical Exam        Physical Exam  Vitals and nursing note reviewed.   Constitutional:       General: She is not in acute distress.     Appearance: She is well-developed.   HENT:      Head: Normocephalic and atraumatic.   Eyes:      Conjunctiva/sclera: Conjunctivae normal.      Pupils: Pupils are equal, round, and reactive to light.   Neck:      Thyroid: No thyromegaly.   Cardiovascular:      Rate and Rhythm: Normal rate and regular rhythm.      Heart sounds: Normal heart sounds.   Pulmonary:      Effort: Pulmonary effort is normal. No respiratory distress.      Breath sounds: Normal breath sounds. No wheezing.   Abdominal:      General: Bowel sounds are normal.      Palpations: Abdomen is soft.      Tenderness: There is no abdominal tenderness.   Musculoskeletal:         General: Normal range of motion.      Cervical back: Normal range of motion and neck supple.      Comments: Smooth border, prominent breast tissue, right outer breast.    Cyst in derm of right axilla   Lymphadenopathy:      Cervical: No cervical adenopathy.   Skin:     General: Skin is warm and dry.      Findings: Rash present.   Neurological:      Mental Status: She is alert and oriented to person, place, and time.   Psychiatric:         Behavior: Behavior normal.         Results    Assessment:           1. HARVEY (generalized anxiety disorder)    2. Dyshidrotic eczema        Plan:     Assessment & Plan  1. Dyshidrotic eczema.  Symptoms are consistent with dyshidrotic eczema, likely due to the use of harsh cleaning agents that have compromised her skin's protective layer. This condition is common in individuals frequently exposed to erosive substances such as degreasers and paints. She was advised to wear gloves while cleaning to protect her hands. A prescription for clobetasol was provided, to be applied twice daily on her hands and the  affected area on her arm.    2. Nausea and fatigue.  These symptoms could be side effects of Zoloft, which can cause tiredness and nausea. She was advised to take a break from Zoloft for a week or switch to taking it at night. Blood tests will be conducted to check her blood counts, iron levels, kidney function, and liver function. She was advised to take Zoloft 25 mg at night for a week and monitor her nausea and fatigue. If these symptoms improve, the dosage will be increased to 50 mg.    3. Breast cyst.  Her breast tissue appears cystic, and her lymph nodes are intact. The palpable lump in her breast is likely a cystic gland rather than a lymph node. A mammogram and ultrasound were ordered to further evaluate the lump.    4. Health Maintenance.  She was advised to minimize the use of roll-on deodorant and try a spray or powder instead.        Vannesa was seen today for annual exam.    Diagnoses and all orders for this visit:    HARVEY (generalized anxiety disorder)  -     Comprehensive Metabolic Panel; Future  -     CBC Auto Differential; Future  -     TSH; Future    Dyshidrotic eczema  -     clobetasol 0.05% (TEMOVATE) 0.05 % Oint; Apply topically 2 (two) times daily.          RTC if condition acutely worsens or any other concerns, otherwise RTC as scheduled      This note was generated with the assistance of ambient listening technology. Verbal consent was obtained by the patient and accompanying visitor(s) for the recording of patient appointment to facilitate this note. I attest to having reviewed and edited the generated note for accuracy, though some syntax or spelling errors may persist. Please contact the author of this note for any clarification.    Answers submitted by the patient for this visit:  Review of Systems Questionnaire (Submitted on 11/7/2024)  activity change: No  unexpected weight change: No  neck pain: No  hearing loss: No  rhinorrhea: No  trouble swallowing: No  eye discharge: No  visual  disturbance: No  chest tightness: No  wheezing: No  chest pain: No  palpitations: No  blood in stool: No  constipation: No  vomiting: No  diarrhea: No  polydipsia: No  polyuria: No  difficulty urinating: No  hematuria: No  menstrual problem: No  dysuria: No  joint swelling: No  arthralgias: No  headaches: Yes  weakness: No  confusion: No  dysphoric mood: No

## 2024-11-18 ENCOUNTER — HOSPITAL ENCOUNTER (OUTPATIENT)
Dept: RADIOLOGY | Facility: HOSPITAL | Age: 34
Discharge: HOME | End: 2024-11-18
Attending: FAMILY MEDICINE
Payer: OTHER GOVERNMENT

## 2024-11-18 DIAGNOSIS — N63.11 MASS OF UPPER OUTER QUADRANT OF RIGHT BREAST: ICD-10-CM

## 2024-11-18 DIAGNOSIS — N60.11 FIBROCYSTIC BREAST CHANGES OF BOTH BREASTS: ICD-10-CM

## 2024-11-18 DIAGNOSIS — N60.12 FIBROCYSTIC BREAST CHANGES OF BOTH BREASTS: ICD-10-CM

## 2024-11-18 PROCEDURE — 76642 ULTRASOUND BREAST LIMITED: CPT | Mod: TC,RT

## 2024-11-18 PROCEDURE — 77066 DX MAMMO INCL CAD BI: CPT | Mod: TC

## 2024-11-18 PROCEDURE — 77062 BREAST TOMOSYNTHESIS BI: CPT | Mod: 26,,, | Performed by: RADIOLOGY

## 2024-11-18 PROCEDURE — 76642 ULTRASOUND BREAST LIMITED: CPT | Mod: 26,RT,, | Performed by: RADIOLOGY

## 2024-11-18 PROCEDURE — 77066 DX MAMMO INCL CAD BI: CPT | Mod: 26,,, | Performed by: RADIOLOGY

## 2024-11-19 ENCOUNTER — PATIENT MESSAGE (OUTPATIENT)
Dept: FAMILY MEDICINE | Facility: CLINIC | Age: 34
End: 2024-11-19
Payer: OTHER GOVERNMENT

## 2024-11-19 ENCOUNTER — PATIENT MESSAGE (OUTPATIENT)
Dept: OBSTETRICS AND GYNECOLOGY | Facility: CLINIC | Age: 34
End: 2024-11-19
Payer: OTHER GOVERNMENT

## 2024-11-19 DIAGNOSIS — F32.81 PMDD (PREMENSTRUAL DYSPHORIC DISORDER): ICD-10-CM

## 2024-11-19 DIAGNOSIS — F41.1 GAD (GENERALIZED ANXIETY DISORDER): ICD-10-CM

## 2024-11-19 RX ORDER — SERTRALINE HYDROCHLORIDE 50 MG/1
50 TABLET, FILM COATED ORAL NIGHTLY
Qty: 30 TABLET | Refills: 11 | Status: SHIPPED | OUTPATIENT
Start: 2024-11-19 | End: 2025-11-19

## 2025-01-13 ENCOUNTER — PATIENT MESSAGE (OUTPATIENT)
Dept: OBSTETRICS AND GYNECOLOGY | Facility: CLINIC | Age: 35
End: 2025-01-13
Payer: OTHER GOVERNMENT

## 2025-01-17 ENCOUNTER — OFFICE VISIT (OUTPATIENT)
Dept: OBSTETRICS AND GYNECOLOGY | Facility: CLINIC | Age: 35
End: 2025-01-17
Payer: OTHER GOVERNMENT

## 2025-01-17 VITALS — HEIGHT: 65 IN | DIASTOLIC BLOOD PRESSURE: 80 MMHG | BODY MASS INDEX: 36.32 KG/M2 | SYSTOLIC BLOOD PRESSURE: 122 MMHG

## 2025-01-17 DIAGNOSIS — Z31.69 ENCOUNTER FOR PRECONCEPTION CONSULTATION: ICD-10-CM

## 2025-01-17 DIAGNOSIS — N76.0 ACUTE VAGINITIS: ICD-10-CM

## 2025-01-17 DIAGNOSIS — Z12.4 CERVICAL CANCER SCREENING: Primary | ICD-10-CM

## 2025-01-17 PROCEDURE — 88175 CYTOPATH C/V AUTO FLUID REDO: CPT | Performed by: PATHOLOGY

## 2025-01-17 PROCEDURE — 99213 OFFICE O/P EST LOW 20 MIN: CPT | Mod: PBBFAC | Performed by: STUDENT IN AN ORGANIZED HEALTH CARE EDUCATION/TRAINING PROGRAM

## 2025-01-17 PROCEDURE — 99999 PR PBB SHADOW E&M-EST. PATIENT-LVL III: CPT | Mod: PBBFAC,,, | Performed by: STUDENT IN AN ORGANIZED HEALTH CARE EDUCATION/TRAINING PROGRAM

## 2025-01-17 PROCEDURE — 88141 CYTOPATH C/V INTERPRET: CPT | Mod: ,,, | Performed by: PATHOLOGY

## 2025-01-17 PROCEDURE — 87624 HPV HI-RISK TYP POOLED RSLT: CPT | Performed by: STUDENT IN AN ORGANIZED HEALTH CARE EDUCATION/TRAINING PROGRAM

## 2025-01-17 PROCEDURE — 99395 PREV VISIT EST AGE 18-39: CPT | Mod: S$PBB,,, | Performed by: STUDENT IN AN ORGANIZED HEALTH CARE EDUCATION/TRAINING PROGRAM

## 2025-01-17 RX ORDER — FLUCONAZOLE 150 MG/1
150 TABLET ORAL DAILY
Qty: 1 TABLET | Refills: 1 | Status: SHIPPED | OUTPATIENT
Start: 2025-01-17 | End: 2025-01-18

## 2025-01-17 NOTE — PROGRESS NOTES
"HPI: Pt is a 34 y.o.  female who presents for routine annual exam.   Doing well  Sertraline is well tolerated and has helped with anxiety a good bit  Questions about weight and health optimization before trying for second pregnancy  Recently took abx now with vulvovaginal itching/irritation    Contraception: NFP    Cervical cancer screening: up to date   Most recent:  NILM/HR HPV neg    History abnormal: no   Gardasil: ? complete  Breast cancer screening: n/a - though did have dx MMG/US done and BIRADS 1, return to routine screening age 41  Colon cancer screening: age 46    Family history breast cancer: [place]  Family history ovarian cancer: no  Family history colon cancer: no    OB History    Para Term  AB Living   1 1 1 0 0 1   SAB IAB Ectopic Multiple Live Births   0 0 0 0 1      # Outcome Date GA Lbr Junior/2nd Weight Sex Type Anes PTL Lv   1 Term 23 39w0d  2.87 kg (6 lb 5.2 oz) F Vag-Spont EPI N LYNDA        /80 (BP Location: Right arm, Patient Position: Sitting)   Ht 5' 5" (1.651 m)   LMP 2024 (Exact Date)   BMI 36.32 kg/m²          PE:   APPEARANCE: Well nourished, well developed, White female in no acute distress.  NODES: no inguinal lymphadenopathy  BREASTS: Symmetrical, no skin changes or visible lesions. No palpable masses, nipple discharge or adenopathy bilaterally.  ABDOMEN: Soft. No tenderness or masses. No distention.   VULVA: No lesions. Normal external female genitalia.  URETHRAL MEATUS: Normal size and location, no lesions, no prolapse.  URETHRA: No masses, tenderness, or prolapse.  VAGINA: Moist. No lesions or lacerations noted. No abnormal discharge present. No odor present.  +inflamed/irritated  CERVIX: No lesions or discharge. No cervical motion tenderness.   UTERUS: Normal size, regular shape, mobile, non-tender.  ADNEXA: No tenderness. No fullness or masses palpated in the adnexal regions.   ANUS PERINEUM: Normal.      Diagnosis:  1. Cervical cancer screening  "   2. Acute vaginitis    3. Encounter for preconception consultation        Plan:     Orders Placed This Encounter    HPV High Risk Genotypes, PCR    Liquid-Based Pap Smear, Screening    fluconazole (DIFLUCAN) 150 MG Tab     Patient was counseled today on the current  ACS guidelines for cervical cytology screening as well as the current recommendations for breast cancer screening.   She was counseled to follow up with her PCP for other routine health maintenance.        RTC 1 year or sooner PRN

## 2025-01-28 LAB
FINAL PATHOLOGIC DIAGNOSIS: NORMAL
Lab: NORMAL

## 2025-03-12 ENCOUNTER — PATIENT MESSAGE (OUTPATIENT)
Dept: OBSTETRICS AND GYNECOLOGY | Facility: CLINIC | Age: 35
End: 2025-03-12
Payer: OTHER GOVERNMENT

## 2025-03-12 ENCOUNTER — PATIENT MESSAGE (OUTPATIENT)
Dept: FAMILY MEDICINE | Facility: CLINIC | Age: 35
End: 2025-03-12
Payer: OTHER GOVERNMENT

## 2025-04-07 ENCOUNTER — PATIENT MESSAGE (OUTPATIENT)
Dept: FAMILY MEDICINE | Facility: CLINIC | Age: 35
End: 2025-04-07
Payer: OTHER GOVERNMENT

## 2025-04-08 ENCOUNTER — PATIENT MESSAGE (OUTPATIENT)
Dept: FAMILY MEDICINE | Facility: CLINIC | Age: 35
End: 2025-04-08

## 2025-04-08 ENCOUNTER — OFFICE VISIT (OUTPATIENT)
Dept: FAMILY MEDICINE | Facility: CLINIC | Age: 35
End: 2025-04-08
Payer: OTHER GOVERNMENT

## 2025-04-08 DIAGNOSIS — E88.819 INSULIN RESISTANCE: ICD-10-CM

## 2025-04-08 DIAGNOSIS — R21 MALAR RASH: Primary | ICD-10-CM

## 2025-04-08 PROCEDURE — 98006 SYNCH AUDIO-VIDEO EST MOD 30: CPT | Mod: 95,,, | Performed by: FAMILY MEDICINE

## 2025-04-10 RX ORDER — METRONIDAZOLE TOPICAL 7.5 MG/G
GEL TOPICAL 2 TIMES DAILY
Qty: 45 G | Refills: 0 | Status: SHIPPED | OUTPATIENT
Start: 2025-04-10 | End: 2026-04-10

## 2025-04-10 NOTE — PROGRESS NOTES
Subjective:       Patient ID: Vannesa Wilkins is a 34 y.o. female.    Chief Complaint: No chief complaint on file.    History of Present Illness    Patient presents today for skin issues and redness She reports facial redness, particularly on her cheeks, that began 3-4 months postpartum. Her face becomes intensely red after showering, accompanied by a burning sensation. She experiences increased skin sensitivity with irritation to eczema-branded moisturizers and retinol cream. A recent intense flare-up occurred following flu B2 infection, with more severe flare-ups noted after intense illnesses. These episodes are accompanied by significant itching. The onset of symptoms does not correlate with starting SSRI or GLP-1 medication. She started Tirzepatide 2 months ago and takes Zofran as needed. She gained 80 lbs during pregnancy and has lost 55 lbs postpartum.          Objective:          Physical Exam  Constitutional:       Appearance: Normal appearance.   Pulmonary:      Effort: Pulmonary effort is normal.   Neurological:      Mental Status: She is alert and oriented to person, place, and time.         Assessment:           1. Malar rash    2. Insulin resistance        Plan:     Assessment & Plan    ROSACEA:  - Monitored the patient's facial redness, particularly on cheeks, persisting for over a year since early postpartum period.  - Noted patient's report of face becoming beet red after shower, accompanied by a burning sensation.  - Observed increased sensitivity to facial products, including eczema-branded moisturizer.  - Evaluated the redness distribution along the lines and neck, typical for rosacea, despite patient's skin tone being atypical for the condition.  - Ordered lupus panel (ALESIA, ESR, CRP) to definitively exclude autoimmune conditions.  - Plan to initiate trial of metronidazole gel if lupus labs are negative, as treatment for possible rosacea.  - Referred the patient to dermatologist Dr. Renee Lin for  "skin check and further evaluation if metronidazole gel proves ineffective.    POST-INFLUENZA COMPLICATIONS:  - Noted patient's recent recovery from influenza B2.  - Evaluated post-viral skin issues, including intense itching and flare-ups after illness.    POSTPARTUM COMPLICATIONS:  - Monitored patient's significant weight fluctuations: 80 lbs gained during pregnancy, 55 lbs lost postpartum.  - Noted patient's current use of GLP-1 agonist (Tirzepatide) for weight management.  - Evaluated improvements in various health markers since starting GLP-1 agonist, including decreased resting heart rate, improved REM sleep, reduced bloating, and better overall well-being.  - Discussed potential benefits of GLP-1 agonists beyond weight loss, including improved mood, mental clarity, hormone regulation, and increased fertility.  - Ordered baseline labs for GLP-1 agonist monitoring: insulin (random, non-fasting), A1C, CMP, B12.  - Advised the patient about increased fertility while on GLP-1 agonists and recommended careful family planning.  - Instructed the patient to practice increased fertility awareness while on GLP-1 agonist.    FOLLOW-UP:  - Follow up to obtain ordered lab work at any [facility] facility.  - Contact the office if lab technicians cannot locate orders, mention they are ordered in "[employer]" system.         Diagnoses and all orders for this visit:    Malar rash  -     ALESIA Screen w/Reflex; Future  -     Sedimentation rate; Future  -     C-Reactive Protein; Future  -     metroNIDAZOLE (METROGEL) 0.75 % gel; Apply topically 2 (two) times daily.  -     Ambulatory referral/consult to Dermatology; Future    Insulin resistance  -     INSULIN, RANDOM; Future  -     Hemoglobin A1C; Future  -     Comprehensive Metabolic Panel; Future    The patient location is: [facility]  The chief complaint leading to consultation is: rash    Visit type: audiovisual    Face to Face time with patient: 17   minutes of total time spent on the " encounter, which includes face to face time and non-face to face time preparing to see the patient (eg, review of tests), Obtaining and/or reviewing separately obtained history, Documenting clinical information in the electronic or other health record, Independently interpreting results (not separately reported) and communicating results to the patient/family/caregiver, or Care coordination (not separately reported).         Each patient to whom he or she provides medical services by telemedicine is:  (1) informed of the relationship between the physician and patient and the respective role of any other health care provider with respect to management of the patient; and (2) notified that he or she may decline to receive medical services by telemedicine and may withdraw from such care at any time.    Notes:         RTC if condition acutely worsens or any other concerns, otherwise RTC as scheduled      This note was generated with the assistance of ambient listening technology. Verbal consent was obtained by the patient and accompanying visitor(s) for the recording of patient appointment to facilitate this note. I attest to having reviewed and edited the generated note for accuracy, though some syntax or spelling errors may persist. Please contact the author of this note for any clarification.   .deep    Answers submitted by the patient for this visit:  Rash Questionnaire (Submitted on 4/8/2025)  Chief Complaint: Rash  Chronicity: chronic  Onset: more than 1 year ago  Progression since onset: gradually worsening  Affected locations: face, neck, chest  Characteristics: burning, redness, itchiness  Exposed to: nothing, an ill contact  anorexia: No  congestion: No  cough: No  eye pain: No  facial edema: No  fatigue: No  fever: No  joint pain: No  nail changes: No  shortness of breath: No  sore throat: No  vomiting: No  Treatments tried: antihistamine, moisturizer  Improvement on treatment: no relief  asthma:  Yes  allergies: Yes  eczema: Yes  varicella: Yes

## 2025-05-20 ENCOUNTER — OFFICE VISIT (OUTPATIENT)
Dept: URGENT CARE | Facility: CLINIC | Age: 35
End: 2025-05-20
Payer: OTHER GOVERNMENT

## 2025-05-20 VITALS
SYSTOLIC BLOOD PRESSURE: 112 MMHG | RESPIRATION RATE: 18 BRPM | WEIGHT: 175 LBS | OXYGEN SATURATION: 97 % | HEIGHT: 65 IN | HEART RATE: 88 BPM | BODY MASS INDEX: 29.16 KG/M2 | DIASTOLIC BLOOD PRESSURE: 81 MMHG | TEMPERATURE: 98 F

## 2025-05-20 DIAGNOSIS — J31.0 RHINITIS, UNSPECIFIED TYPE: ICD-10-CM

## 2025-05-20 DIAGNOSIS — R09.81 NASAL CONGESTION: ICD-10-CM

## 2025-05-20 DIAGNOSIS — Z20.828 EXPOSURE TO RESPIRATORY SYNCYTIAL VIRUS (RSV): ICD-10-CM

## 2025-05-20 DIAGNOSIS — R05.1 ACUTE COUGH: ICD-10-CM

## 2025-05-20 DIAGNOSIS — B33.8 INFECTION DUE TO RESPIRATORY SYNCYTIAL VIRUS (RSV), UNSPECIFIED INFECTION TYPE: Primary | ICD-10-CM

## 2025-05-20 PROBLEM — J45.909 ASTHMA: Status: ACTIVE | Noted: 2018-08-14

## 2025-05-20 LAB
CTP QC/QA: YES
POC RSV RAPID ANT MOLECULAR: POSITIVE

## 2025-05-20 PROCEDURE — 87634 RSV DNA/RNA AMP PROBE: CPT | Mod: QW,S$GLB,, | Performed by: NURSE PRACTITIONER

## 2025-05-20 PROCEDURE — 99214 OFFICE O/P EST MOD 30 MIN: CPT | Mod: S$GLB,,, | Performed by: NURSE PRACTITIONER

## 2025-05-20 RX ORDER — ONDANSETRON 8 MG/1
8 TABLET, ORALLY DISINTEGRATING ORAL EVERY 6 HOURS PRN
COMMUNITY
Start: 2025-04-19

## 2025-05-20 RX ORDER — BENZONATATE 100 MG/1
100 CAPSULE ORAL 3 TIMES DAILY PRN
Qty: 30 CAPSULE | Refills: 0 | Status: SHIPPED | OUTPATIENT
Start: 2025-05-20 | End: 2025-05-30

## 2025-05-20 RX ORDER — FLUTICASONE PROPIONATE 50 MCG
2 SPRAY, SUSPENSION (ML) NASAL DAILY PRN
Qty: 15.8 ML | Refills: 0 | Status: SHIPPED | OUTPATIENT
Start: 2025-05-20

## 2025-05-20 RX ORDER — ALBUTEROL SULFATE 90 UG/1
2 INHALANT RESPIRATORY (INHALATION) EVERY 6 HOURS PRN
Qty: 18 G | Refills: 0 | Status: SHIPPED | OUTPATIENT
Start: 2025-05-20 | End: 2026-05-20

## 2025-05-20 NOTE — PROGRESS NOTES
"Subjective:      Patient ID: Vannesa Wilkins is a 34 y.o. female.    Vitals:  height is 5' 5" (1.651 m) and weight is 79.4 kg (175 lb). Her oral temperature is 98.2 °F (36.8 °C). Her blood pressure is 112/81 and her pulse is 88. Her respiration is 18 and oxygen saturation is 97%.     Chief Complaint: Cough (Entered by patient)    Patient symptoms started 5 days ago with a sore throat, that went away by the next day.  Patient was having a hard time breathing and was recovering from perifluenza. She felt like her symptoms were gone 9 days ago, the same day her daughter started getting sick. 7 days ago her daughter was diagnosed with RSV. She is convinced that she has RSV because her symptoms started the day after her daughter was diagnosed. Her third day of being sick she had chills and sweats for that one day. Patient took ibuprofen, mucinex, and benadryl with some relief.     34-year-old female presets to clinic with complaints of cough, exposure to RSV treating with ibuprofen, Mucinex and Benadryl. History of asthma, bronchitis, pneumonia, environmental allergies prn use Zyrtec, Xyzal     Cough  This is a new problem. The current episode started in the past 7 days. The problem has been gradually worsening. The problem occurs constantly. The cough is Productive of sputum. Associated symptoms include chills and sweats. Pertinent negatives include no fever. The treatment provided mild relief. Her past medical history is significant for asthma, bronchitis, environmental allergies and pneumonia.       Constitution: Positive for chills. Negative for sweating, fatigue and fever.   HENT:  Positive for congestion.         Watery nasal secretions   Respiratory:  Positive for cough and asthma.    Allergic/Immunologic: Positive for environmental allergies and asthma.      Objective:     Physical Exam   Constitutional: She is oriented to person, place, and time. She appears well-developed. She is cooperative.  Non-toxic appearance. " She does not appear ill. No distress.   HENT:   Head: Normocephalic and atraumatic.   Ears:   Right Ear: Hearing, tympanic membrane, external ear and ear canal normal.   Left Ear: Hearing, tympanic membrane, external ear and ear canal normal.   Nose: Mucosal edema present. No rhinorrhea or nasal deformity. No epistaxis. Right sinus exhibits no maxillary sinus tenderness and no frontal sinus tenderness. Left sinus exhibits no maxillary sinus tenderness and no frontal sinus tenderness.   Mouth/Throat: Uvula is midline, oropharynx is clear and moist and mucous membranes are normal. No trismus in the jaw. Normal dentition. No uvula swelling. No oropharyngeal exudate, posterior oropharyngeal edema or posterior oropharyngeal erythema.   Watery nasal secretions        Comments: Watery nasal secretions    Eyes: Conjunctivae and lids are normal. No scleral icterus.   Neck: Trachea normal and phonation normal. Neck supple. No edema present. No erythema present. No neck rigidity present.   Cardiovascular: Normal rate, regular rhythm and normal heart sounds.   Pulmonary/Chest: Effort normal and breath sounds normal. No respiratory distress. She has no decreased breath sounds. She has no rhonchi.   Abdominal: Normal appearance.   Musculoskeletal: Normal range of motion.         General: No deformity. Normal range of motion.   Neurological: She is alert and oriented to person, place, and time. She exhibits normal muscle tone. Coordination normal.   Skin: Skin is warm, dry, intact, not diaphoretic and not pale.   Psychiatric: Her speech is normal and behavior is normal. Judgment and thought content normal.   Nursing note and vitals reviewed.      Assessment:     1. Infection due to respiratory syncytial virus (RSV), unspecified infection type    2. Acute cough    3. Rhinitis, unspecified type    4. Nasal congestion    5. Exposure to respiratory syncytial virus (RSV)      Results for orders placed or performed in visit on 05/20/25    POCT RSV by Molecular    Collection Time: 05/20/25 10:28 AM   Result Value Ref Range    POC RSV Rapid Ant Molecular Positive (A) Negative     Acceptable Yes         Plan:       Infection due to respiratory syncytial virus (RSV), unspecified infection type  -     albuterol (VENTOLIN HFA) 90 mcg/actuation inhaler; Inhale 2 puffs into the lungs every 6 (six) hours as needed for Wheezing. Rescue  Dispense: 18 g; Refill: 0    Acute cough  -     POCT RSV by Molecular  -     benzonatate (TESSALON) 100 MG capsule; Take 1 capsule (100 mg total) by mouth 3 (three) times daily as needed for Cough.  Dispense: 30 capsule; Refill: 0    Rhinitis, unspecified type  -     fluticasone propionate (FLONASE) 50 mcg/actuation nasal spray; 2 sprays (100 mcg total) by Each Nostril route daily as needed for Rhinitis.  Dispense: 15.8 mL; Refill: 0    Nasal congestion    Exposure to respiratory syncytial virus (RSV)  -     POCT RSV by Molecular      Patient Instructions   You should also:   Wash your hands often (figure 1), and cough or sneeze into a tissue. If you do not have a tissue, cough or sneeze into your elbow instead of your hands.   Take all of your medicines, include any inhaler medicines, exactly as your doctor tells you to.   Drink lots of fluids (water, juice, or broth) to stay hydrated, unless your doctor says otherwise. This will help replace any fluids lost if you have a runny nose or sweating from a fever. Warm tea or soup can help soothe a sore throat.   Use a humidifier if the air in your home is very dry. This can help a stuffy nose and make it easier to breathe. You can also use saline nose drops or spray to relieve stuffiness.   Follow the directions on the label carefully if you take any over-the-counter cough or cold medicines. Do not take more than 1 medicine that contains acetaminophen. Also, if you have a heart problem or high blood pressure, check with your doctor before you take any cough or cold  medicine  ER PRECAUTIONS  When should I call the doctor? -- Call for advice if:   You have a persistent fever of 100.4°F (38°C) or higher, chills, a very bad sore throat, or ear or sinus pain.   You develop a new fever after several days of feeling the same or getting better.   You have chest pain when you cough.   You have a cough that lasts more than 10 days.   You cough up blood.   You have any new or worsening symptoms, such as worsening cough or trouble breathing.  Please return here or go to the Emergency Department for any concerns or worsening of condition.  Please follow up with your primary care doctor or specialist as needed.    If you  smoke, please stop smoking.

## 2025-05-20 NOTE — PATIENT INSTRUCTIONS
You should also:   Wash your hands often (figure 1), and cough or sneeze into a tissue. If you do not have a tissue, cough or sneeze into your elbow instead of your hands.   Take all of your medicines, include any inhaler medicines, exactly as your doctor tells you to.   Drink lots of fluids (water, juice, or broth) to stay hydrated, unless your doctor says otherwise. This will help replace any fluids lost if you have a runny nose or sweating from a fever. Warm tea or soup can help soothe a sore throat.   Use a humidifier if the air in your home is very dry. This can help a stuffy nose and make it easier to breathe. You can also use saline nose drops or spray to relieve stuffiness.   Follow the directions on the label carefully if you take any over-the-counter cough or cold medicines. Do not take more than 1 medicine that contains acetaminophen. Also, if you have a heart problem or high blood pressure, check with your doctor before you take any cough or cold medicine  ER PRECAUTIONS  When should I call the doctor? -- Call for advice if:   You have a persistent fever of 100.4°F (38°C) or higher, chills, a very bad sore throat, or ear or sinus pain.   You develop a new fever after several days of feeling the same or getting better.   You have chest pain when you cough.   You have a cough that lasts more than 10 days.   You cough up blood.   You have any new or worsening symptoms, such as worsening cough or trouble breathing.  Please return here or go to the Emergency Department for any concerns or worsening of condition.  Please follow up with your primary care doctor or specialist as needed.    If you  smoke, please stop smoking.

## 2025-06-26 ENCOUNTER — PATIENT MESSAGE (OUTPATIENT)
Dept: FAMILY MEDICINE | Facility: CLINIC | Age: 35
End: 2025-06-26
Payer: OTHER GOVERNMENT

## 2025-06-26 DIAGNOSIS — Z13.88 NEED FOR LEAD SCREENING: Primary | ICD-10-CM

## 2025-07-11 ENCOUNTER — OFFICE VISIT (OUTPATIENT)
Dept: OBSTETRICS AND GYNECOLOGY | Facility: CLINIC | Age: 35
End: 2025-07-11
Payer: OTHER GOVERNMENT

## 2025-07-11 VITALS
HEIGHT: 65 IN | WEIGHT: 167.13 LBS | SYSTOLIC BLOOD PRESSURE: 118 MMHG | DIASTOLIC BLOOD PRESSURE: 80 MMHG | BODY MASS INDEX: 27.85 KG/M2

## 2025-07-11 DIAGNOSIS — Z31.69 ENCOUNTER FOR PRECONCEPTION CONSULTATION: Primary | ICD-10-CM

## 2025-07-11 PROCEDURE — 99213 OFFICE O/P EST LOW 20 MIN: CPT | Mod: PBBFAC | Performed by: STUDENT IN AN ORGANIZED HEALTH CARE EDUCATION/TRAINING PROGRAM

## 2025-07-11 PROCEDURE — 99214 OFFICE O/P EST MOD 30 MIN: CPT | Mod: S$PBB,,, | Performed by: STUDENT IN AN ORGANIZED HEALTH CARE EDUCATION/TRAINING PROGRAM

## 2025-07-11 PROCEDURE — 99999 PR PBB SHADOW E&M-EST. PATIENT-LVL III: CPT | Mod: PBBFAC,,, | Performed by: STUDENT IN AN ORGANIZED HEALTH CARE EDUCATION/TRAINING PROGRAM

## 2025-08-06 NOTE — PROGRESS NOTES
"HPI:  Vannesa is a 34 y.o.  seen today for follow-up/preconception  Feels more like herself than ever  Doing well on her sertraline 50  On tirzepatide low dose under supervision of her PCP  As previously noted, Vannesa has always been conscientious about her diet and has been diligent about exercise. She struggled to lose weight and in fact seemed to be putting in tremendous effort just to maintain her weight   She remains diligent about diet/exercise but is now seeing the payoff; in fact she spends less mental energy on this than previous and is still able to lose weight even on dose that is typically subtherapeutic   She is not yet TTC, but they are in the planning stages  She experienced excessive maternal weight gain last pregnancy despite adhering to dietary recommendations and she notes her weight gain, and the subsequent stress and anxiety contributed to her CHTN in pregnancy   She feels the tirzepatide has corrected a metabolic derangement (or otherwise) and worries about having to stop this with pregnancy -- worried about excessive weight gain but more so the adverse health risks that come with this    OB History    Para Term  AB Living   1 1 1 0 0 1   SAB IAB Ectopic Multiple Live Births   0 0 0 0 1      # Outcome Date GA Lbr Junior/2nd Weight Sex Type Anes PTL Lv   1 Term 23 39w0d  2.87 kg (6 lb 5.2 oz) F Vag-Spont EPI N LYNDA        /80 (Patient Position: Sitting)   Ht 5' 5" (1.651 m)   Wt 75.8 kg (167 lb 1.7 oz)   LMP 2025   BMI 27.81 kg/m²      PE:   APPEARANCE: Well nourished, well developed, no acute distress.  RESPIRATORY: normal respiratory effort  EXTREMITIES: normal ROM, no edema bilaterally  NEURO: alert and oriented, normal gait  PSYCH: normal mood and affect      Diagnosis:  1. Encounter for preconception consultation        Plan:     Vannesa was seen today for med follow up.    Diagnoses and all orders for this visit:    Encounter for preconception " consultation    We discussed the lack of data on GLP1s in pregnancy which she is very aware of.  We also discussed there are pregnant patients enrolled in prospective studies who choose to remain on their GLP1s.  We will reassess the body of literature when she is ready to begin TTC, thinking about 1 year.  We will weigh this against risk of excessive weight gain and associated adverse health risks.  Discussed option for preconception MFM visit to further discuss.    F/U for  routine gyn exam or sooner PRN    I spent a total of 39 minutes on the day of the visit.  This includes face to face time and non-face to face time preparing to see the patient (eg, review of tests), obtaining and/or reviewing separately obtained history, documenting clinical information in the electronic or other health record, independently interpreting results and communicating results to the patient/family/caregiver, or care coordinator.

## 2025-08-20 ENCOUNTER — PATIENT MESSAGE (OUTPATIENT)
Dept: OBSTETRICS AND GYNECOLOGY | Facility: CLINIC | Age: 35
End: 2025-08-20
Payer: OTHER GOVERNMENT